# Patient Record
Sex: FEMALE | Race: WHITE | Employment: UNEMPLOYED | URBAN - METROPOLITAN AREA
[De-identification: names, ages, dates, MRNs, and addresses within clinical notes are randomized per-mention and may not be internally consistent; named-entity substitution may affect disease eponyms.]

---

## 2020-08-21 ENCOUNTER — HOSPITAL ENCOUNTER (EMERGENCY)
Age: 25
Discharge: HOME | End: 2020-08-21
Attending: EMERGENCY MEDICINE

## 2020-08-21 DIAGNOSIS — M54.50 ACUTE LEFT-SIDED LOW BACK PAIN WITHOUT SCIATICA: Primary | ICD-10-CM

## 2020-08-21 LAB
APPEARANCE UR: CLEAR
BILIRUB UR QL: NEGATIVE
COLOR UR: YELLOW
GLUCOSE UR STRIP.AUTO-MCNC: NEGATIVE MG/DL
HCG UR QL: NEGATIVE
HGB UR QL STRIP: NEGATIVE
KETONES UR QL STRIP.AUTO: NEGATIVE MG/DL
LEUKOCYTE ESTERASE UR QL STRIP.AUTO: NEGATIVE
NITRITE UR QL STRIP.AUTO: NEGATIVE
PH UR STRIP: 5.5 [PH] (ref 5–8)
PROT UR STRIP-MCNC: NEGATIVE MG/DL
SP GR UR REFRACTOMETRY: 1.02 (ref 1–1.03)
UROBILINOGEN UR QL STRIP.AUTO: 0.2 EU/DL (ref 0.2–1)

## 2020-08-21 PROCEDURE — 96372 THER/PROPH/DIAG INJ SC/IM: CPT

## 2020-08-21 PROCEDURE — 81025 URINE PREGNANCY TEST: CPT

## 2020-08-21 PROCEDURE — 81003 URINALYSIS AUTO W/O SCOPE: CPT

## 2020-08-21 PROCEDURE — 99283 EMERGENCY DEPT VISIT LOW MDM: CPT

## 2020-08-21 PROCEDURE — 74011250636 HC RX REV CODE- 250/636: Performed by: EMERGENCY MEDICINE

## 2020-08-21 RX ORDER — KETOROLAC TROMETHAMINE 15 MG/ML
30 INJECTION, SOLUTION INTRAMUSCULAR; INTRAVENOUS
Status: DISCONTINUED | OUTPATIENT
Start: 2020-08-21 | End: 2020-08-21 | Stop reason: DRUGHIGH

## 2020-08-21 RX ORDER — NAPROXEN 500 MG/1
500 TABLET ORAL 2 TIMES DAILY WITH MEALS
Qty: 20 TAB | Refills: 0 | Status: SHIPPED | OUTPATIENT
Start: 2020-08-21 | End: 2020-08-31

## 2020-08-21 RX ORDER — KETOROLAC TROMETHAMINE 15 MG/ML
15 INJECTION, SOLUTION INTRAMUSCULAR; INTRAVENOUS
Status: COMPLETED | OUTPATIENT
Start: 2020-08-21 | End: 2020-08-21

## 2020-08-21 RX ADMIN — KETOROLAC TROMETHAMINE 15 MG: 15 INJECTION, SOLUTION INTRAMUSCULAR; INTRAVENOUS at 06:35

## 2020-08-21 NOTE — DISCHARGE INSTRUCTIONS
Patient Education        Learning About Relief for Back Pain  What is back tension and strain? Back strain happens when you overstretch, or pull, a muscle in your back. You may hurt your back in an accident or when you exercise or lift something. Most back pain will get better with rest and time. You can take care of yourself at home to help your back heal.  What can you do first to relieve back pain? When you first feel back pain, try these steps:  · Walk. Take a short walk (10 to 20 minutes) on a level surface (no slopes, hills, or stairs) every 2 to 3 hours. Walk only distances you can manage without pain, especially leg pain. · Relax. Find a comfortable position for rest. Some people are comfortable on the floor or a medium-firm bed with a small pillow under their head and another under their knees. Some people prefer to lie on their side with a pillow between their knees. Don't stay in one position for too long. · Try heat or ice. Try using a heating pad on a low or medium setting, or take a warm shower, for 15 to 20 minutes every 2 to 3 hours. Or you can buy single-use heat wraps that last up to 8 hours. You can also try an ice pack for 10 to 15 minutes every 2 to 3 hours. You can use an ice pack or a bag of frozen vegetables wrapped in a thin towel. There is not strong evidence that either heat or ice will help, but you can try them to see if they help. You may also want to try switching between heat and cold. · Take pain medicine exactly as directed. ¨ If the doctor gave you a prescription medicine for pain, take it as prescribed. ¨ If you are not taking a prescription pain medicine, ask your doctor if you can take an over-the-counter medicine. What else can you do? · Stretch and exercise. Exercises that increase flexibility may relieve your pain and make it easier for your muscles to keep your spine in a good, neutral position. And don't forget to keep walking. · Do self-massage.  You can use self-massage to unwind after work or school or to energize yourself in the morning. You can easily massage your feet, hands, or neck. Self-massage works best if you are in comfortable clothes and are sitting or lying in a comfortable position. Use oil or lotion to massage bare skin. · Reduce stress. Back pain can lead to a vicious [place]: Distress about the pain tenses the muscles in your back, which in turn causes more pain. Learn how to relax your mind and your muscles to lower your stress. Where can you learn more? Go to http://www.gray.com/. Enter F974 in the search box to learn more about \"Learning About Relief for Back Pain. \"  Current as of: March 21, 2017  Content Version: 11.5  © 2870-7578 Healthwise, Incorporated. Care instructions adapted under license by [employer] (which disclaims liability or warranty for this information). If you have questions about a medical condition or this instruction, always ask your healthcare professional. [address] any warranty or liability for your use of this information.

## 2020-08-21 NOTE — ED PROVIDER NOTES
EMERGENCY DEPARTMENT HISTORY AND PHYSICAL EXAM  This was created with voice recognition software and transcription errors may be present. 6:19 AM  Date: 8/21/2020  Patient Name: Lemuel Guillermo    History of Presenting Illness     Chief Complaint:    History Provided By:     HPI: Lemuel Guillermo is a 22 y.o. female past medical history of appendectomy who presents for low back pain. Patient states she was in the shower 2 days ago slipped and caught herself and is now has low back pain worse with movement better with rest no numbness or tingling no weakness. PCP: None      Past History     Past Medical History:  History reviewed. No pertinent past medical history. Past Surgical History:  Past Surgical History:   Procedure Laterality Date    HX APPENDECTOMY         Family History:  History reviewed. No pertinent family history. Social History:  Social History     Tobacco Use    Smoking status: Current Every Day Smoker   Substance Use Topics    Alcohol use: Not Currently    Drug use: Not Currently       Allergies:  No Known Allergies    Review of Systems     Review of Systems   All other systems reviewed and are negative. 10 point review of systems otherwise negative unless noted in HPI. Physical Exam       Physical Exam  Constitutional:       Appearance: She is well-developed. HENT:      Head: Normocephalic and atraumatic. Eyes:      Pupils: Pupils are equal, round, and reactive to light. Neck:      Musculoskeletal: Normal range of motion and neck supple. Cardiovascular:      Rate and Rhythm: Normal rate and regular rhythm. Heart sounds: Normal heart sounds. No murmur. No friction rub. Pulmonary:      Effort: Pulmonary effort is normal. No respiratory distress. Breath sounds: Normal breath sounds. No wheezing. Abdominal:      General: There is no distension. Palpations: Abdomen is soft. Tenderness: There is no abdominal tenderness.  There is no guarding or rebound. Musculoskeletal: Normal range of motion. Comments: No significant bony tenderness to the lumbar spine there is some perineal spinous lumbar pain   Skin:     General: Skin is warm and dry. Neurological:      Mental Status: She is alert and oriented to person, place, and time. Psychiatric:         Behavior: Behavior normal.         Thought Content: Thought content normal.         Diagnostic Study Results     Vital Signs  EKG:  Labs:   Imaging:     Medical Decision Making     ED Course: Progress Notes, Reevaluation, and Consults:      Provider Notes (Medical Decision Making): This is a 27-year-old female who presents with low back pain x2 to 3 days after a fall in the shower. No numbness or tingling no weakness no bowel or bladder changes. Exam is unremarkable she ambulated fairly reasonably from the waiting room chair to the triage area where I met her. Check hCG and UA discharge with pain meds if negative       Diagnosis     Clinical Impression: No diagnosis found.     Disposition:    Patient's Medications    No medications on file

## 2020-08-21 NOTE — PROGRESS NOTES
Discharge instructions explained to patient with complete verbal understanding noted. Printed Naproxen Rx and work letter given to patient by hand. Discharge summary signed. Patient discharged and ambulated to exit.

## 2020-08-21 NOTE — LETTER
NOTIFICATION RETURN TO WORK / SCHOOL 
 
8/21/2020 8:31 AM 
 
Ms. Mayur Hughes [address]  [place] [address] To Whom It May Concern: 
 
Mayur Hughes is currently under the care of [facility]. She will return to work/school on: 8/22/2020 Mayur Hughes may return to work/school with the following restrictions: None. If there are questions or concerns please have the patient contact our office.  
 
 
 
Sincerely, 
 
 
Trevon Holder RN

## 2020-08-21 NOTE — ED TRIAGE NOTES
Ambulatory to triage. C/o nonradiating lower-mid back pain x2-3 days ago. Patient states she slipped this morning and caught herself the back pain worsened.

## 2021-01-31 ENCOUNTER — HOSPITAL ENCOUNTER (EMERGENCY)
Age: 26
Discharge: HOME | End: 2021-02-01
Attending: EMERGENCY MEDICINE

## 2021-01-31 VITALS
HEART RATE: 85 BPM | TEMPERATURE: 98.5 F | SYSTOLIC BLOOD PRESSURE: 178 MMHG | DIASTOLIC BLOOD PRESSURE: 98 MMHG | OXYGEN SATURATION: 98 % | RESPIRATION RATE: 18 BRPM

## 2021-01-31 DIAGNOSIS — R10.9 FLANK PAIN: Primary | ICD-10-CM

## 2021-01-31 DIAGNOSIS — R10.11 RUQ ABDOMINAL PAIN: ICD-10-CM

## 2021-01-31 DIAGNOSIS — N20.0 KIDNEY STONE: ICD-10-CM

## 2021-01-31 LAB
ALBUMIN SERPL-MCNC: 3.6 G/DL (ref 3.4–5)
ALBUMIN/GLOB SERPL: 1.1 {RATIO} (ref 0.8–1.7)
ALP SERPL-CCNC: 57 U/L (ref 45–117)
ALT SERPL-CCNC: 73 U/L (ref 13–56)
AMORPH CRY URNS QL MICRO: ABNORMAL
ANION GAP SERPL CALC-SCNC: 7 MMOL/L (ref 3–18)
APPEARANCE UR: ABNORMAL
AST SERPL-CCNC: 29 U/L (ref 10–38)
BACTERIA URNS QL MICRO: ABNORMAL /HPF
BASOPHILS # BLD: 0 K/UL (ref 0–0.1)
BASOPHILS NFR BLD: 0 % (ref 0–2)
BILIRUB SERPL-MCNC: 0.4 MG/DL (ref 0.2–1)
BILIRUB UR QL: NEGATIVE
BUN SERPL-MCNC: 13 MG/DL (ref 7–18)
BUN/CREAT SERPL: 13 (ref 12–20)
CALCIUM SERPL-MCNC: 8.5 MG/DL (ref 8.5–10.1)
CHLORIDE SERPL-SCNC: 107 MMOL/L (ref 100–111)
CO2 SERPL-SCNC: 28 MMOL/L (ref 21–32)
COLOR UR: YELLOW
CREAT SERPL-MCNC: 1.02 MG/DL (ref 0.6–1.3)
DIFFERENTIAL METHOD BLD: NORMAL
EOSINOPHIL # BLD: 0.4 K/UL (ref 0–0.4)
EOSINOPHIL NFR BLD: 4 % (ref 0–5)
EPITH CASTS URNS QL MICRO: ABNORMAL /LPF (ref 0–5)
ERYTHROCYTE [DISTWIDTH] IN BLOOD BY AUTOMATED COUNT: 12.9 % (ref 11.6–14.5)
GLOBULIN SER CALC-MCNC: 3.3 G/DL (ref 2–4)
GLUCOSE SERPL-MCNC: 121 MG/DL (ref 74–99)
GLUCOSE UR STRIP.AUTO-MCNC: NEGATIVE MG/DL
HCG UR QL: NEGATIVE
HCT VFR BLD AUTO: 39.3 % (ref 35–45)
HGB BLD-MCNC: 12.6 G/DL (ref 12–16)
HGB UR QL STRIP: NEGATIVE
KETONES UR QL STRIP.AUTO: NEGATIVE MG/DL
LEUKOCYTE ESTERASE UR QL STRIP.AUTO: NEGATIVE
LIPASE SERPL-CCNC: 132 U/L (ref 73–393)
LYMPHOCYTES # BLD: 2.9 K/UL (ref 0.9–3.6)
LYMPHOCYTES NFR BLD: 29 % (ref 21–52)
MAGNESIUM SERPL-MCNC: 2 MG/DL (ref 1.6–2.6)
MCH RBC QN AUTO: 29.5 PG (ref 24–34)
MCHC RBC AUTO-ENTMCNC: 32.1 G/DL (ref 31–37)
MCV RBC AUTO: 92 FL (ref 74–97)
MONOCYTES # BLD: 0.5 K/UL (ref 0.05–1.2)
MONOCYTES NFR BLD: 5 % (ref 3–10)
NEUTS SEG # BLD: 6.4 K/UL (ref 1.8–8)
NEUTS SEG NFR BLD: 62 % (ref 40–73)
NITRITE UR QL STRIP.AUTO: NEGATIVE
PH UR STRIP: >8.5 [PH] (ref 5–8)
PLATELET # BLD AUTO: 303 K/UL (ref 135–420)
PMV BLD AUTO: 9.9 FL (ref 9.2–11.8)
POTASSIUM SERPL-SCNC: 4 MMOL/L (ref 3.5–5.5)
PROT SERPL-MCNC: 6.9 G/DL (ref 6.4–8.2)
PROT UR STRIP-MCNC: ABNORMAL MG/DL
RBC # BLD AUTO: 4.27 M/UL (ref 4.2–5.3)
RBC #/AREA URNS HPF: NEGATIVE /HPF (ref 0–5)
SODIUM SERPL-SCNC: 142 MMOL/L (ref 136–145)
SP GR UR REFRACTOMETRY: 1.03 (ref 1–1.03)
UROBILINOGEN UR QL STRIP.AUTO: 1 EU/DL (ref 0.2–1)
WBC # BLD AUTO: 10.2 K/UL (ref 4.6–13.2)
WBC URNS QL MICRO: ABNORMAL /HPF (ref 0–4)

## 2021-01-31 PROCEDURE — 83690 ASSAY OF LIPASE: CPT

## 2021-01-31 PROCEDURE — 81025 URINE PREGNANCY TEST: CPT

## 2021-01-31 PROCEDURE — 96376 TX/PRO/DX INJ SAME DRUG ADON: CPT

## 2021-01-31 PROCEDURE — 81001 URINALYSIS AUTO W/SCOPE: CPT

## 2021-01-31 PROCEDURE — 83735 ASSAY OF MAGNESIUM: CPT

## 2021-01-31 PROCEDURE — 85025 COMPLETE CBC W/AUTO DIFF WBC: CPT

## 2021-01-31 PROCEDURE — 87086 URINE CULTURE/COLONY COUNT: CPT

## 2021-01-31 PROCEDURE — 96374 THER/PROPH/DIAG INJ IV PUSH: CPT

## 2021-01-31 PROCEDURE — 96375 TX/PRO/DX INJ NEW DRUG ADDON: CPT

## 2021-01-31 PROCEDURE — 74011250636 HC RX REV CODE- 250/636: Performed by: PHYSICIAN ASSISTANT

## 2021-01-31 PROCEDURE — 80053 COMPREHEN METABOLIC PANEL: CPT

## 2021-01-31 PROCEDURE — 99282 EMERGENCY DEPT VISIT SF MDM: CPT

## 2021-01-31 RX ORDER — MORPHINE SULFATE 4 MG/ML
4 INJECTION, SOLUTION INTRAMUSCULAR; INTRAVENOUS
Status: COMPLETED | OUTPATIENT
Start: 2021-01-31 | End: 2021-01-31

## 2021-01-31 RX ORDER — MORPHINE SULFATE 2 MG/ML
2 INJECTION, SOLUTION INTRAMUSCULAR; INTRAVENOUS
Status: COMPLETED | OUTPATIENT
Start: 2021-01-31 | End: 2021-01-31

## 2021-01-31 RX ORDER — KETOROLAC TROMETHAMINE 15 MG/ML
15 INJECTION, SOLUTION INTRAMUSCULAR; INTRAVENOUS
Status: COMPLETED | OUTPATIENT
Start: 2021-01-31 | End: 2021-01-31

## 2021-01-31 RX ADMIN — KETOROLAC TROMETHAMINE 15 MG: 15 INJECTION, SOLUTION INTRAMUSCULAR; INTRAVENOUS at 21:29

## 2021-01-31 RX ADMIN — MORPHINE SULFATE 2 MG: 2 INJECTION, SOLUTION INTRAMUSCULAR; INTRAVENOUS at 21:29

## 2021-01-31 RX ADMIN — MORPHINE SULFATE 4 MG: 4 INJECTION, SOLUTION INTRAMUSCULAR; INTRAVENOUS at 23:23

## 2021-01-31 NOTE — ED TRIAGE NOTES
Has had rt sided pain for the past couple of weeks. Was tx for a yeast infection at [facility] 2-3 weeks ago. Pain hasn't gotten better.

## 2021-02-01 ENCOUNTER — APPOINTMENT (OUTPATIENT)
Dept: CT IMAGING | Age: 26
End: 2021-02-01
Attending: PHYSICIAN ASSISTANT

## 2021-02-01 PROCEDURE — 74176 CT ABD & PELVIS W/O CONTRAST: CPT

## 2021-02-01 RX ORDER — KETOROLAC TROMETHAMINE 10 MG/1
10 TABLET, FILM COATED ORAL
Qty: 15 TAB | Refills: 0 | Status: SHIPPED | OUTPATIENT
Start: 2021-02-01 | End: 2021-02-05 | Stop reason: ALTCHOICE

## 2021-02-01 NOTE — DISCHARGE INSTRUCTIONS
[employer] Activation    Thank you for requesting access to [employer]. Please follow the instructions below to securely access and download your online medical record. [employer] allows you to send messages to your doctor, view your test results, renew your prescriptions, schedule appointments, and more. How Do I Sign Up? In your internet browser, go to [email or web address]  Click on the First Time User? Click Here link in the Sign In box. You will be redirect to the New Member Sign Up page. Enter your [employer] Access Code exactly as it appears below. You will not need to use this code after youve completed the sign-up process. If you do not sign up before the expiration date, you must request a new code. [employer] Access Code: [unfilled] (This is the date your [employer] access code will )    Enter the last four digits of your Social Security Number (xxxx) and Date of Birth (mm/dd/yyyy) as indicated and click Submit. You will be taken to the next sign-up page. Create a [employer] ID. This will be your [employer] login ID and cannot be changed, so think of one that is secure and easy to remember. Create a [employer] password. You can change your password at any time. Enter your Password Reset Question and Answer. This can be used at a later time if you forget your password. Enter your e-mail address. You will receive e-mail notification when new information is available in [address] Click Sign Up. You can now view and download portions of your medical record. Click the [place] link to download a portable copy of your medical information. Additional Information    If you have questions, please visit the Frequently Asked Questions section of the [employer] website at [email or web address] Remember, [employer] is NOT to be used for urgent needs. For medical emergencies, dial 911.

## 2021-02-01 NOTE — ED NOTES
Discharged by provider. Patient armband removed and given to patient to take home. Patient was informed of the privacy risks if armband lost or stolen.

## 2021-02-01 NOTE — ED PROVIDER NOTES
EMERGENCY DEPARTMENT HISTORY AND PHYSICAL EXAM    Date: 1/31/2021  Patient Name: Gabbie Jones    History of Presenting Illness     Chief Complaint   Patient presents with    Flank Pain         History Provided By: patient    Chief Complaint: abd pain   Duration: few weeks   Timing: acute, intermittent  Location: R flank and RUQ   Quality: sharp  Severity:moderate  Modifying Factors: none  Associated Symptoms: abd pain, flank pain, diarrhea     Additional History (Context): Gabbie Jones is a 26 y.o. female with no documented PMH who presents with c/o continued intermittent R flank and RUQ abd pain x a few weeks. Pt states she was seen at [facility] a few weeks ago for similar complaints. She states she was treated for a vaginal yeast infection but denies any resolution of her abd pain. Denies urinary sx but does report some diarrhea, x 3 days. Denies N/V. Denies pelvic pain and vaginal d/c. No known sick exposures. No concerns for COVID. No other complaints reported at this time. PCP: None        Past History     Past Medical History:  History reviewed. No pertinent past medical history. Past Surgical History:  Past Surgical History:   Procedure Laterality Date    HX APPENDECTOMY         Family History:  History reviewed. No pertinent family history. Social History:  Social History     Tobacco Use    Smoking status: Current Every Day Smoker   Substance Use Topics    Alcohol use: Not Currently    Drug use: Not Currently       Allergies:  No Known Allergies      Review of Systems   Review of Systems   Constitutional: Negative. Negative for chills and fever. HENT: Negative. Negative for congestion, ear pain and rhinorrhea. Eyes: Negative. Negative for pain and redness. Respiratory: Negative. Negative for cough, shortness of breath, wheezing and stridor. Cardiovascular: Negative. Negative for chest pain and leg swelling. Gastrointestinal: Positive for abdominal pain and diarrhea.  Negative for constipation, nausea and vomiting. Genitourinary: Positive for flank pain. Negative for dysuria, frequency, pelvic pain and vaginal discharge. Musculoskeletal: Negative for back pain and neck pain. Skin: Negative. Negative for rash and wound. Neurological: Negative. Negative for dizziness, seizures, syncope and headaches. All other systems reviewed and are negative. All Other Systems Negative  Physical Exam     Vitals:    01/31/21 1824 01/31/21 2132   BP: (!) 159/84 (!) 178/98   Pulse: 96 85   Resp: 18    Temp: 98.5 °F (36.9 °C)    SpO2: 98%      Physical Exam  Vitals signs and nursing note reviewed. Constitutional:       General: She is in acute distress. Appearance: She is well-developed. She is obese. She is not diaphoretic. Comments: Appears moderately distressed   HENT:      Head: Normocephalic and atraumatic. Eyes:      General: No scleral icterus. Right eye: No discharge. Left eye: No discharge. Conjunctiva/sclera: Conjunctivae normal.   Neck:      Musculoskeletal: Normal range of motion and neck supple. Cardiovascular:      Rate and Rhythm: Normal rate and regular rhythm. Heart sounds: Normal heart sounds. No murmur. No friction rub. No gallop. Pulmonary:      Effort: Pulmonary effort is normal. No respiratory distress. Breath sounds: Normal breath sounds. No stridor. No wheezing, rhonchi or rales. Abdominal:      General: Bowel sounds are normal. There is no distension. Palpations: Abdomen is soft. Tenderness: There is abdominal tenderness. There is right CVA tenderness. There is no left CVA tenderness or guarding. Comments: Mild R CVA and RUQ TTP, without guarding or peritoneal signs noted on exam.    Musculoskeletal: Normal range of motion. Skin:     General: Skin is warm and dry. Findings: No erythema or rash. Neurological:      Mental Status: She is alert and oriented to person, place, and time. Coordination: Coordination normal.      Comments: Gait is steady and patient exhibits no evidence of ataxia. Patient is able to ambulate without difficulty. No focal neurological deficit noted. No facial droop, slurred speech, or evidence of altered mentation noted on exam.     Psychiatric:         Behavior: Behavior normal.         Thought Content: Thought content normal.                Diagnostic Study Results     Labs -     Recent Results (from the past 12 hour(s))   URINALYSIS W/ RFLX MICROSCOPIC    Collection Time: 01/31/21  6:28 PM   Result Value Ref Range    Color YELLOW      Appearance TURBID      Specific gravity 1.026 1.005 - 1.030      pH (UA) >8.5 (H) 5.0 - 8.0    Protein TRACE (A) NEG mg/dL    Glucose Negative NEG mg/dL    Ketone Negative NEG mg/dL    Bilirubin Negative NEG      Blood Negative NEG      Urobilinogen 1.0 0.2 - 1.0 EU/dL    Nitrites Negative NEG      Leukocyte Esterase Negative NEG     HCG URINE, QL    Collection Time: 01/31/21  6:28 PM   Result Value Ref Range    HCG urine, QL Negative NEG     URINE MICROSCOPIC ONLY    Collection Time: 01/31/21  6:28 PM   Result Value Ref Range    WBC 0 to 1 0 - 4 /hpf    RBC Negative 0 - 5 /hpf    Epithelial cells 1+ 0 - 5 /lpf    Bacteria 2+ (A) NEG /hpf    Amorphous Crystals 2+ (A) NEG   CBC WITH AUTOMATED DIFF    Collection Time: 01/31/21  9:22 PM   Result Value Ref Range    WBC 10.2 4.6 - 13.2 K/uL    RBC 4.27 4.20 - 5.30 M/uL    HGB 12.6 12.0 - 16.0 g/dL    HCT 39.3 35.0 - 45.0 %    MCV 92.0 74.0 - 97.0 FL    MCH 29.5 24.0 - 34.0 PG    MCHC 32.1 31.0 - 37.0 g/dL    RDW 12.9 11.6 - 14.5 %    PLATELET 176 678 - 044 K/uL    MPV 9.9 9.2 - 11.8 FL    NEUTROPHILS 62 40 - 73 %    LYMPHOCYTES 29 21 - 52 %    MONOCYTES 5 3 - 10 %    EOSINOPHILS 4 0 - 5 %    BASOPHILS 0 0 - 2 %    ABS. NEUTROPHILS 6.4 1.8 - 8.0 K/UL    ABS. LYMPHOCYTES 2.9 0.9 - 3.6 K/UL    ABS. MONOCYTES 0.5 0.05 - 1.2 K/UL    ABS. EOSINOPHILS 0.4 0.0 - 0.4 K/UL    ABS.  BASOPHILS 0.0 0.0 - 0.1 K/UL    DF AUTOMATED     METABOLIC PANEL, COMPREHENSIVE    Collection Time: 01/31/21  9:22 PM   Result Value Ref Range    Sodium 142 136 - 145 mmol/L    Potassium 4.0 3.5 - 5.5 mmol/L    Chloride 107 100 - 111 mmol/L    CO2 28 21 - 32 mmol/L    Anion gap 7 3.0 - 18 mmol/L    Glucose 121 (H) 74 - 99 mg/dL    BUN 13 7.0 - 18 MG/DL    Creatinine 1.02 0.6 - 1.3 MG/DL    BUN/Creatinine ratio 13 12 - 20      GFR est AA >60 >60 ml/min/1.73m2    GFR est non-AA >60 >60 ml/min/1.73m2    Calcium 8.5 8.5 - 10.1 MG/DL    Bilirubin, total 0.4 0.2 - 1.0 MG/DL    ALT (SGPT) 73 (H) 13 - 56 U/L    AST (SGOT) 29 10 - 38 U/L    Alk. phosphatase 57 45 - 117 U/L    Protein, total 6.9 6.4 - 8.2 g/dL    Albumin 3.6 3.4 - 5.0 g/dL    Globulin 3.3 2.0 - 4.0 g/dL    A-G Ratio 1.1 0.8 - 1.7     LIPASE    Collection Time: 01/31/21  9:22 PM   Result Value Ref Range    Lipase 132 73 - 393 U/L   MAGNESIUM    Collection Time: 01/31/21  9:22 PM   Result Value Ref Range    Magnesium 2.0 1.6 - 2.6 mg/dL       Radiologic Studies -   CT ABD PELV WO CONT   Final Result      A few punctate nonobstructive nephroliths bilaterally. A few colonic diverticula. Bilateral L5 pars defects. CT Results  (Last 48 hours)               02/01/21 0124  CT ABD PELV WO CONT Final result    Impression:      A few punctate nonobstructive nephroliths bilaterally. A few colonic diverticula. Bilateral L5 pars defects. Narrative:  EXAMINATION: CT abdomen/pelvis without contrast       INDICATION: Right-sided abdominal pain       COMPARISON: 9/23/2010       TECHNIQUE: CT of the abdomen pelvis without contrast with multiplanar   reformations obtained.  All CT scans at this facility are performed using dose   optimization technique as appropriate to a performed exam, to include automated   exposure control, adjustment of the mA and/or kV according to patient size   (including appropriate matching first site specific examinations), or use of   iterative reconstruction technique. FINDINGS:       Evaluation of soft tissues and vessels limited without enhancement. Lower chest: Unremarkable. Hepatobiliary: Liver unremarkable. Gallbladder unremarkable. No biliary duct   dilatation. Pancreas: Unremarkable. Spleen: Unremarkable. Adrenals: Unremarkable. Genitourinary:   -Right kidney: Punctate calculus in the mid kidney, otherwise unremarkable.   -Left kidney: Punctate upper pole calculus, otherwise unremarkable.   -Bladder/reproductive: Bladder unremarkable. Uterus unremarkable. Gastrointestinal: Stomach unremarkable. Small bowel loops nondilated. The colon   is nondilated with a few diverticula. Appendix unremarkable. Mesentery/vessels/nodes: No free air. No free fluid. Major vessels unremarkable. No adenopathy by size criteria. Miscellaneous: Superficial soft tissues unremarkable. Bones: Bilateral L5 pars defects. No acute osseous findings. CXR Results  (Last 48 hours)    None            Medical Decision Making   I am the first provider for this patient. I reviewed the vital signs, available nursing notes, past medical history, past surgical history, family history and social history. Vital Signs-Reviewed the patient's vital signs. Records Reviewed: Anneliese Juan PA-C     Procedures:  Procedures    Provider Notes (Medical Decision Making): Impression:  Flank pain, RUQ abd pain     IV inserted toradol and morphine given   CBC, CMP, and lipase essentially unremarkable  hcg negative, UA: 2 + bacteria, trace protein, sent for culture     CT abd/pelvis ordered, pt being transported to CT now, 1:04 AM     2:10 AM CT scan shows few small intrarenal stones, no obstructing or ureteral stones noted. Urine was sent for culture. Will plan to d.c with toradol and urology follow-up. UA not convincing of UTI, will obtain culture before starting abx. Pt agrees with this plan.  Anneliese EL EMERSON Juan         MED RECONCILIATION:  No current facility-administered medications for this encounter. Current Outpatient Medications   Medication Sig    ketorolac (TORADOL) 10 mg tablet Take 1 Tab by mouth every six (6) hours as needed for Pain. Disposition:  D/c    DISCHARGE NOTE: Patient is stable for discharge at this time. I have discussed all the findings from today's work up with the patient, including lab results and imaging. I have answered all questions. Rx for toradol given. Rest and close follow-up with the PCP recommended this week. Return to the ED immediately for any new or worsening symptoms. Anneliese Juan PA-C       Follow-up Information     Follow up With Specialties Details Why Contact Info    [facility]  In 1 week  [address] [facility] EM[facility]GENC[facility] DEPT Emergency Medicine  As needed, If symptoms worsen [address] [facility] [address]  [phone]          Current Discharge Medication List      START taking these medications    Details   ketorolac (TORADOL) 10 mg tablet Take 1 Tab by mouth every six (6) hours as needed for Pain. Qty: 15 Tab, Refills: 0                 Diagnosis     Clinical Impression:   1. Flank pain    2. RUQ abdominal pain    3.  Kidney stone

## 2021-02-02 LAB
BACTERIA SPEC CULT: NORMAL
CC UR VC: NORMAL
SERVICE CMNT-IMP: NORMAL

## 2021-02-05 ENCOUNTER — VIRTUAL VISIT (OUTPATIENT)
Dept: FAMILY MEDICINE CLINIC | Facility: CLINIC | Age: 26
End: 2021-02-05

## 2021-02-05 DIAGNOSIS — F17.200 SMOKER: ICD-10-CM

## 2021-02-05 DIAGNOSIS — R05.9 COUGH: ICD-10-CM

## 2021-02-05 DIAGNOSIS — R03.0 ELEVATED BLOOD PRESSURE READING: ICD-10-CM

## 2021-02-05 DIAGNOSIS — R10.9 FLANK PAIN: Primary | ICD-10-CM

## 2021-02-05 PROCEDURE — 99214 OFFICE O/P EST MOD 30 MIN: CPT | Performed by: FAMILY MEDICINE

## 2021-02-05 RX ORDER — IBUPROFEN 800 MG/1
800 TABLET ORAL
Qty: 30 TAB | Refills: 1 | Status: SHIPPED | OUTPATIENT
Start: 2021-02-05

## 2021-02-05 RX ORDER — CYCLOBENZAPRINE HCL 10 MG
10 TABLET ORAL
Qty: 30 TAB | Refills: 1 | Status: SHIPPED | OUTPATIENT
Start: 2021-02-05 | End: 2022-01-30

## 2021-02-05 RX ORDER — BENZONATATE 100 MG/1
100 CAPSULE ORAL
Qty: 30 CAP | Refills: 0 | Status: SHIPPED | OUTPATIENT
Start: 2021-02-05 | End: 2021-02-12

## 2021-02-05 NOTE — PROGRESS NOTES
RD Resendez is a 26 y.o. female being seen today for No chief complaint on file. 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 She has recent ER visit for flank pain. She thought it was a pulled muscle but it just wasn't getting better. She had a cough and the pain was worst when coughing. She also has pain when bending to the side. She does have hx of kidney stones as a teenager. She has some history of blood in her urine a month ago but she had a yeast infection at the time and she has been treated for this. Her urine test from the ER did not show blood. Her CT scan showed a punctate kidney stone one on each side but they were in the kidney, not in the ureter. No hydroureter. She was noted to have high blood pressure. No past medical history on file. ROS  Patient states that she is feeling well. Denies complaints of chest pain, shortness of breath, swelling of legs, dizziness or weakness. she denies nausea, vomiting or diarrhea. Current Outpatient Medications   Medication Sig    cyclobenzaprine (FLEXERIL) 10 mg tablet Take 1 Tab by mouth three (3) times daily as needed for Muscle Spasm(s).  ibuprofen (MOTRIN) 800 mg tablet Take 1 Tab by mouth every eight (8) hours as needed for Pain.  benzonatate (TESSALON) 100 mg capsule Take 1 Cap by mouth three (3) times daily as needed for Cough for up to 7 days. No current facility-administered medications for this visit. PE  Visit Vitals  LMP 01/17/2021        Alert and oriented with normal mood and affect. Assessment and Plan:        ICD-10-CM ICD-9-CM    1. Flank pain  R10.9 789.09    2. Cough  R05 786.2    3. Smoker  F17.200 305.1    4. Elevated blood pressure reading  R03.0 796.2      Her pain sounds most c/w pulled muscle from coughing. Try flexeril, ibuprofen for muscle pain. Try tessalon for cough suppression to aid in healing of pulled muscle.      Follow up in person if not improving   Work note sent    Follow up in person for bp check, ua,  and [employer] when conditions allow    Debra Muir MD

## 2021-02-09 ENCOUNTER — TELEPHONE (OUTPATIENT)
Dept: FAMILY MEDICINE CLINIC | Facility: CLINIC | Age: 26
End: 2021-02-09

## 2021-02-15 ENCOUNTER — VIRTUAL VISIT (OUTPATIENT)
Dept: FAMILY MEDICINE CLINIC | Facility: CLINIC | Age: 26
End: 2021-02-15

## 2021-02-15 DIAGNOSIS — F41.9 ANXIETY: Primary | ICD-10-CM

## 2021-02-15 PROCEDURE — 99442 PR PHYS/QHP TELEPHONE EVALUATION 11-20 MIN: CPT | Performed by: FAMILY MEDICINE

## 2021-02-15 RX ORDER — LORAZEPAM 1 MG/1
0.5 TABLET ORAL
Qty: 30 TAB | Refills: 0 | Status: SHIPPED | OUTPATIENT
Start: 2021-02-15 | End: 2022-05-25

## 2021-02-15 NOTE — PROGRESS NOTES
HPI  Michael Cristina is a 26 y.o. female being seen today for No chief complaint on file. .  she states that her flank pain is improved. She states she is experiencing acute anxiety. Her mom just passed away and she feels mood swings, and anxiety. Her mom was in a rehab center but the death was sudden. She also lost her dad about a month ago. She has history of depression and anxiety as a teen. She did have one episode of attempted suicide as a teen but she was not hospitalized and she has never been on a psych med. She feels stressed out and broken hearted. She is snapping at her  and crying a lot. She is alone when her  is at work but she has support from family members out of state. She denies suicidal ideation    Limited funds to pay for meds. Virtual visit due to covid precautions    No past medical history on file. ROS  Patient states that she is feeling well. Denies complaints of chest pain, shortness of breath, swelling of legs, dizziness or weakness. she denies nausea, vomiting or diarrhea. Current Outpatient Medications   Medication Sig    LORazepam (ATIVAN) 1 mg tablet Take 0.5 Tabs by mouth every eight (8) hours as needed for Anxiety. Max Daily Amount: 1.5 mg.    cyclobenzaprine (FLEXERIL) 10 mg tablet Take 1 Tab by mouth three (3) times daily as needed for Muscle Spasm(s).  ibuprofen (MOTRIN) 800 mg tablet Take 1 Tab by mouth every eight (8) hours as needed for Pain. No current facility-administered medications for this visit. PE  Visit Vitals  LMP 01/17/2021        Alert and oriented with anxious, mood congruent affect. She is tearful          Assessment and Plan:        ICD-10-CM ICD-9-CM    1. Anxiety  F41.9 300.00 LORazepam (ATIVAN) 1 mg tablet     May benefit from SSRI long term and we discussed that  For now will treat with short term ativan to get quicker relief. Discussed grief can take time to process.      Will call back tomorrow to check on her    Addendum:  Called back next day. Pt is at work. She did  the ativan and took one at bedtime. Slept through the night for the first time in a few weeks but she has not tried it in the daytime yet. She has my number if she has any problems. Will follow up in a week or so      Telephone call 20 minutes    Rosa Renee MD        Pursuant to the emergency declaration under the [facility], [facility] waiver authority and the Coronavirus Preparedness and [employer] Act, this Virtual  Visit was conducted, with patient's consent, to reduce the patient's risk of exposure to COVID-19 and provide continuity of care for an established patient.

## 2021-02-16 ENCOUNTER — TELEPHONE (OUTPATIENT)
Dept: FAMILY MEDICINE CLINIC | Facility: CLINIC | Age: 26
End: 2021-02-16

## 2021-03-16 ENCOUNTER — HOSPITAL ENCOUNTER (EMERGENCY)
Age: 26
Discharge: HOME | End: 2021-03-16
Attending: EMERGENCY MEDICINE

## 2021-03-16 VITALS
OXYGEN SATURATION: 98 % | HEIGHT: 64 IN | DIASTOLIC BLOOD PRESSURE: 85 MMHG | SYSTOLIC BLOOD PRESSURE: 158 MMHG | TEMPERATURE: 98.1 F | HEART RATE: 94 BPM | WEIGHT: 293 LBS | RESPIRATION RATE: 12 BRPM | BODY MASS INDEX: 50.02 KG/M2

## 2021-03-16 DIAGNOSIS — M54.31 SCIATICA OF RIGHT SIDE: Primary | ICD-10-CM

## 2021-03-16 PROCEDURE — 99283 EMERGENCY DEPT VISIT LOW MDM: CPT

## 2021-03-16 PROCEDURE — 81003 URINALYSIS AUTO W/O SCOPE: CPT

## 2021-03-16 PROCEDURE — 81025 URINE PREGNANCY TEST: CPT

## 2021-03-16 RX ORDER — DIAZEPAM 5 MG/1
5 TABLET ORAL
Qty: 20 TAB | Refills: 0 | Status: SHIPPED | OUTPATIENT
Start: 2021-03-16 | End: 2022-01-30

## 2021-03-16 RX ORDER — ACETAMINOPHEN 325 MG/1
650 TABLET ORAL
Qty: 20 TAB | Refills: 0 | Status: SHIPPED | OUTPATIENT
Start: 2021-03-16

## 2021-03-16 RX ORDER — KETOROLAC TROMETHAMINE 10 MG/1
10 TABLET, FILM COATED ORAL
Qty: 20 TAB | Refills: 0 | Status: SHIPPED | OUTPATIENT
Start: 2021-03-16 | End: 2022-01-30

## 2021-03-16 NOTE — ED PROVIDER NOTES
EMERGENCY DEPARTMENT HISTORY AND PHYSICAL EXAM    Date: 3/16/2021  Patient Name: Gabbie Jones    History of Presenting Illness     Chief Complaint   Patient presents with    Back Pain         History Provided By: Patient    Chief Complaint: Right lower back pain  Duration: Couple days  Timing: Worsening  Location: Right lower back and buttock  Quality: Burning  Severity: Moderate  Modifying Factors: Worse with movement  Associated Symptoms: none       Additional History (Context): Gabbie Jones is a 26 y.o. female with a history of an appendectomy who presents today for issues listed above. Patient reports she has been trying Tylenol at home for this pain without relief. Denies any true falls or injuries. Denies any history of back pain. Denies any dysuria or urinary complaints. Denies any abdominal pain, flank pain, fever or chills. States she is unsure when her last period is and states she is sexually active without protection and could possibly be pregnant. PCP: None    Current Outpatient Medications   Medication Sig Dispense Refill    ketorolac (TORADOL) 10 mg tablet Take 1 Tab by mouth every six (6) hours as needed for Pain. 20 Tab 0    acetaminophen (TYLENOL) 325 mg tablet Take 2 Tabs by mouth every four (4) hours as needed for Pain. 20 Tab 0    diazePAM (Valium) 5 mg tablet Take 1 Tab by mouth three (3) times daily as needed for Anxiety (spasm). Max Daily Amount: 15 mg. 20 Tab 0    LORazepam (ATIVAN) 1 mg tablet Take 0.5 Tabs by mouth every eight (8) hours as needed for Anxiety. Max Daily Amount: 1.5 mg. 30 Tab 0    cyclobenzaprine (FLEXERIL) 10 mg tablet Take 1 Tab by mouth three (3) times daily as needed for Muscle Spasm(s). 30 Tab 1    ibuprofen (MOTRIN) 800 mg tablet Take 1 Tab by mouth every eight (8) hours as needed for Pain. 30 Tab 1       Past History     Past Medical History:  No past medical history on file.     Past Surgical History:  Past Surgical History:   Procedure Laterality Date    HX APPENDECTOMY         Family History:  No family history on file. Social History:  Social History     Tobacco Use    Smoking status: Current Every Day Smoker    Smokeless tobacco: Never Used   Substance Use Topics    Alcohol use: Not Currently    Drug use: Not Currently       Allergies:  No Known Allergies      Review of Systems   Review of Systems   Constitutional: Negative for chills and fever. HENT: Negative for congestion, drooling, rhinorrhea and sore throat. Respiratory: Negative for cough and shortness of breath. Cardiovascular: Negative for chest pain. Gastrointestinal: Negative for abdominal pain, blood in stool, constipation, diarrhea, nausea and vomiting. Genitourinary: Negative for dysuria, frequency and hematuria. Musculoskeletal: Positive for back pain. Negative for myalgias. Skin: Negative for rash and wound. Neurological: Negative for dizziness and headaches. All other systems reviewed and are negative. All Other Systems Negative  Physical Exam     Vitals:    03/16/21 1047   BP: (!) 158/85   Pulse: 94   Resp: 12   Temp: 98.1 °F (36.7 °C)   SpO2: 98%   Weight: (!) 191.4 kg (422 lb)   Height: 5' 4\" (1.626 m)     Physical Exam  Vitals signs and nursing note reviewed. Constitutional:       General: She is not in acute distress. Appearance: She is well-developed. She is not diaphoretic. HENT:      Head: Normocephalic and atraumatic. Eyes:      Conjunctiva/sclera: Conjunctivae normal.   Neck:      Musculoskeletal: Normal range of motion and neck supple. Cardiovascular:      Rate and Rhythm: Normal rate and regular rhythm. Heart sounds: Normal heart sounds. Pulmonary:      Effort: Pulmonary effort is normal. No respiratory distress. Breath sounds: Normal breath sounds. Chest:      Chest wall: No tenderness. Abdominal:      General: Bowel sounds are normal. There is no distension. Palpations: Abdomen is soft. Tenderness: There is no abdominal tenderness. There is no guarding or rebound. Musculoskeletal:         General: No deformity. Lumbar back: She exhibits tenderness. Comments: Right Lower  paralumbar reproducible tenderness to palpation. No Lumbar midline TTP. No other midline vertebral bony point tenderness or step-off. No foot drop. Distal sensation intact bilaterally, normal gait, no saddle anesthesia. Bilateral DP 2+. + right  straight leg raise. Skin:     General: Skin is warm and dry. Neurological:      Mental Status: She is alert and oriented to person, place, and time. Deep Tendon Reflexes: Reflexes are normal and symmetric. Diagnostic Study Results     Labs -     Recent Results (from the past 12 hour(s))   URINALYSIS W/ RFLX MICROSCOPIC    Collection Time: 03/16/21 10:49 AM   Result Value Ref Range    Color YELLOW      Appearance CLEAR      Specific gravity 1.022 1.005 - 1.030      pH (UA) 5.5 5.0 - 8.0      Protein Negative NEG mg/dL    Glucose Negative NEG mg/dL    Ketone Negative NEG mg/dL    Bilirubin Negative NEG      Blood Negative NEG      Urobilinogen 0.2 0.2 - 1.0 EU/dL    Nitrites Negative NEG      Leukocyte Esterase Negative NEG     HCG URINE, QL    Collection Time: 03/16/21 10:49 AM   Result Value Ref Range    HCG urine, QL Negative NEG         Radiologic Studies -   No orders to display     CT Results  (Last 48 hours)    None        CXR Results  (Last 48 hours)    None            Medical Decision Making   I am the first provider for this patient. I reviewed the vital signs, available nursing notes, past medical history, past surgical history, family history and social history. Vital Signs-Reviewed the patient's vital signs.       Records Reviewed: Nursing Notes and Old Medical Records     Procedures: None   Procedures    Provider Notes (Medical Decision Making):       Differential Diagnosis: Musculoskeletal pain, myofascial strain/sprain, muscle spasm, spondylolisthesis, spondylosis, DJD, OA, sciatica, cauda equina syndrome, UTI, pregnancy     Plan: Will order UA and urine preg       11:54 AM  History and physical exam consistent with sciatica. No red flag signs or emergent need for imaging at this time. No midline tenderness. Will discharge home with pain medication and muscle relaxants. Have stressed the importance of stretching and hot baths with Epsom salt. Have advised orthopedic follow-up. Patient agrees with the plan and management and states all questions have been thoroughly answered and there are no more remaining questions. MED RECONCILIATION:  No current facility-administered medications for this encounter. Current Outpatient Medications   Medication Sig    ketorolac (TORADOL) 10 mg tablet Take 1 Tab by mouth every six (6) hours as needed for Pain.  acetaminophen (TYLENOL) 325 mg tablet Take 2 Tabs by mouth every four (4) hours as needed for Pain.  diazePAM (Valium) 5 mg tablet Take 1 Tab by mouth three (3) times daily as needed for Anxiety (spasm). Max Daily Amount: 15 mg.  LORazepam (ATIVAN) 1 mg tablet Take 0.5 Tabs by mouth every eight (8) hours as needed for Anxiety. Max Daily Amount: 1.5 mg.    cyclobenzaprine (FLEXERIL) 10 mg tablet Take 1 Tab by mouth three (3) times daily as needed for Muscle Spasm(s).  ibuprofen (MOTRIN) 800 mg tablet Take 1 Tab by mouth every eight (8) hours as needed for Pain. Disposition:  Home     DISCHARGE NOTE:   Pt has been reexamined. Patient has no new complaints, changes, or physical findings. Care plan outlined and precautions discussed. Results of workup were reviewed with the patient. All medications were reviewed with the patient. All of pt's questions and concerns were addressed. Patient was instructed and agrees to follow up with ortho as well as to return to the ED upon further deterioration. Patient is ready to go home.     Follow-up Information     Follow up With Specialties Details Why Contact Info    [facility] EMERGENCY DEPT Emergency Medicine  As needed [address] [facility] - [facility] Orthopedic Surgery Schedule an appointment as soon as possible for a visit   [place], [address] [address]  [phone]          Current Discharge Medication List      START taking these medications    Details   ketorolac (TORADOL) 10 mg tablet Take 1 Tab by mouth every six (6) hours as needed for Pain. Qty: 20 Tab, Refills: 0      acetaminophen (TYLENOL) 325 mg tablet Take 2 Tabs by mouth every four (4) hours as needed for Pain. Qty: 20 Tab, Refills: 0      diazePAM (Valium) 5 mg tablet Take 1 Tab by mouth three (3) times daily as needed for Anxiety (spasm). Max Daily Amount: 15 mg.  Qty: 20 Tab, Refills: 0    Associated Diagnoses: Sciatica of right side                 Diagnosis     Clinical Impression:   1. Sciatica of right side          \"Please note that this dictation was completed with [employer], the computer voice recognition software. Quite often unanticipated grammatical, syntax, homophones, and other interpretive errors are inadvertently transcribed by the computer software. Please disregard these errors. Please excuse any errors that have escaped final proofreading. \"

## 2021-03-16 NOTE — Clinical Note
[facility] EMERGENCY DEPT 
[address] [id number] [phone] Work/School Note Date: 3/16/2021 To Whom It May concern: 
 
Jason Morris was seen and treated today in the emergency room by the following provider(s): 
Attending Provider: Mecca Salas MD 
Physician Assistant: MAXIMILIAN Calderon. Jason Morris is excused from work/school on 3/16/2021 through 3/19/2021. She is medically clear to return to work/school on 3/20/2021. Sincerely, MAXIMILIAN Holguin

## 2021-03-16 NOTE — ED TRIAGE NOTES
The patient presents for evaluation of right lower back pain x one week. She describes the pain at \"burning\". Denies abdominal pain, nausea, vomiting, or diarrhea.

## 2021-06-02 ENCOUNTER — HOSPITAL ENCOUNTER (EMERGENCY)
Age: 26
Discharge: HOME | End: 2021-06-02
Attending: EMERGENCY MEDICINE
Payer: MEDICAID

## 2021-06-02 VITALS
SYSTOLIC BLOOD PRESSURE: 158 MMHG | RESPIRATION RATE: 20 BRPM | HEART RATE: 109 BPM | WEIGHT: 293 LBS | BODY MASS INDEX: 50.02 KG/M2 | HEIGHT: 64 IN | OXYGEN SATURATION: 95 % | TEMPERATURE: 99.1 F | DIASTOLIC BLOOD PRESSURE: 77 MMHG

## 2021-06-02 DIAGNOSIS — R60.0 BILATERAL LEG EDEMA: Primary | ICD-10-CM

## 2021-06-02 PROCEDURE — 99283 EMERGENCY DEPT VISIT LOW MDM: CPT

## 2021-06-02 PROCEDURE — 74011250637 HC RX REV CODE- 250/637: Performed by: EMERGENCY MEDICINE

## 2021-06-02 RX ORDER — FUROSEMIDE 40 MG/1
20 TABLET ORAL
Status: COMPLETED | OUTPATIENT
Start: 2021-06-02 | End: 2021-06-02

## 2021-06-02 RX ORDER — FUROSEMIDE 20 MG/1
20 TABLET ORAL DAILY
Qty: 10 TABLET | Refills: 0 | Status: SHIPPED | OUTPATIENT
Start: 2021-06-02 | End: 2021-06-12

## 2021-06-02 RX ADMIN — FUROSEMIDE 20 MG: 40 TABLET ORAL at 21:51

## 2021-06-02 NOTE — LETTER
[facility] EMERGENCY DEPT 
[address] [id number] [phone] Work/School Note Date: 6/2/2021 To Whom It May concern: 
 
Ty Oreilly was seen and treated today in the emergency room by the following provider(s): 
Attending Provider: Airam Esteban MD.   
 
Ty Oreilly is excused from work/school on 06/02/21 and 06/03/21. She is medically clear to return to work/school on 6/4/2021. Sincerely, Kurt Anderson RN

## 2021-06-03 NOTE — ED PROVIDER NOTES
EMERGENCY DEPARTMENT HISTORY AND PHYSICAL EXAM    9:33 PM  Date: 6/2/2021  Patient Name: Brad Le    History of Presenting Illness     Chief Complaint   Patient presents with    Leg Swelling        History Provided By: Patient    HPI: Brad Le is a 26 y.o. female with history of chronic bilateral lower extremity edema. Patient is presenting with worsening of her leg swelling for the past couple of days. She states that she was off of work and when she returned to work she started getting worse. Feels generally tightness in her legs but no pain. She used to be on diuretics however she lost her insurance and has not seen a primary care doctor in a long time so she could not refill her prescription. No history of previous VTE. No fever or rash. No chest pain no respiratory symptoms. Not on birth control pills. Location:  Severity:  Timing/course: Onset/Duration:     PCP: None    Past History     Past Medical History:  No past medical history on file. Past Surgical History:  Past Surgical History:   Procedure Laterality Date    HX APPENDECTOMY         Family History:  No family history on file. Social History:  Social History     Tobacco Use    Smoking status: Current Every Day Smoker    Smokeless tobacco: Never Used   Substance Use Topics    Alcohol use: Not Currently    Drug use: Not Currently       Allergies:  No Known Allergies    Review of Systems   Review of Systems   Cardiovascular: Positive for leg swelling. All other systems reviewed and are negative. Physical Exam     Patient Vitals for the past 12 hrs:   Temp Pulse Resp BP SpO2   06/02/21 2119 99.1 °F (37.3 °C) (!) 109 20 (!) 170/84 96 %       Physical Exam  Vitals and nursing note reviewed. Constitutional:       Appearance: She is obese. HENT:      Head: Normocephalic and atraumatic. Eyes:      Extraocular Movements: Extraocular movements intact. Cardiovascular:      Rate and Rhythm: Normal rate. Pulses: Normal pulses. Pulmonary:      Effort: Pulmonary effort is normal. No respiratory distress. Musculoskeletal:         General: No deformity. Normal range of motion. Cervical back: Normal range of motion and neck supple. Right lower leg: Edema present. Left lower leg: Edema present. Skin:     General: Skin is warm and dry. Neurological:      General: No focal deficit present. Mental Status: She is alert and oriented to person, place, and time. Psychiatric:         Mood and Affect: Mood normal.         Behavior: Behavior normal.         Diagnostic Study Results     Labs -  No results found for this or any previous visit (from the past 12 hour(s)). Radiologic Studies -   No results found. Medical Decision Making     ED Course: Progress Notes, Reevaluation, and Consults:    9:33 PM Initial assessment performed. The patients presenting problems have been discussed, and they/their family are in agreement with the care plan formulated and outlined with them. I have encouraged them to ask questions as they arise throughout their visit. Provider Notes (Medical Decision Making): 32-year-old female with history of lower extremity edema presenting for worsening of her swelling. Well-appearing on exam and not in distress. She likely has lymphedema. No tenderness to palpation over her calf and they appear symmetrical.  No signs of cellulitis. I discussed with the patient that she can get an ultrasound as an outpatient tomorrow since it is after hours now but I have low suspicion for DVT. We will refill her prescription of diuretics and have her follow-up with [facility]. She was also provided with strict return precautions. Procedures:     Critical Care Time:     Vital Signs-Reviewed the patient's vital signs. Reviewed pt's pulse ox reading. EKG: Interpreted by the EP.    Time Interpreted:    Rate:    Rhythm:    Interpretation:   Comparison: Records Reviewed: Nursing Notes (Time of Review: 9:33 PM)  -I am the first provider for this patient.  -I reviewed the vital signs, available nursing notes, past medical history, past surgical history, family history and social history. Current Outpatient Medications   Medication Sig Dispense Refill    ibuprofen (MOTRIN) 800 mg tablet Take 1 Tab by mouth every eight (8) hours as needed for Pain. 30 Tab 1    ketorolac (TORADOL) 10 mg tablet Take 1 Tab by mouth every six (6) hours as needed for Pain. (Patient not taking: Reported on 6/2/2021) 20 Tab 0    acetaminophen (TYLENOL) 325 mg tablet Take 2 Tabs by mouth every four (4) hours as needed for Pain. (Patient not taking: Reported on 6/2/2021) 20 Tab 0    diazePAM (Valium) 5 mg tablet Take 1 Tab by mouth three (3) times daily as needed for Anxiety (spasm). Max Daily Amount: 15 mg. (Patient not taking: Reported on 6/2/2021) 20 Tab 0    LORazepam (ATIVAN) 1 mg tablet Take 0.5 Tabs by mouth every eight (8) hours as needed for Anxiety. Max Daily Amount: 1.5 mg. (Patient not taking: Reported on 6/2/2021) 30 Tab 0    cyclobenzaprine (FLEXERIL) 10 mg tablet Take 1 Tab by mouth three (3) times daily as needed for Muscle Spasm(s). (Patient not taking: Reported on 6/2/2021) 30 Tab 1        Clinical Impression     Clinical Impression: No diagnosis found. Disposition: dc      This note was dictated utilizing voice recognition software which may lead to typographical errors. I apologize in advance if the situation occurs. If questions arise please do not hesitate to contact me or call our department.     Imer Calzada MD  9:33 PM

## 2021-06-03 NOTE — ED TRIAGE NOTES
Pt to ED with complaints of bilateral lower extremity pain and swelling. States leg swelling is Chronic but worse today as she returned to work today and has been standing all day. Pt reports being prescribed Diuretics but is currently out of prescription.

## 2021-06-04 ENCOUNTER — HOSPITAL ENCOUNTER (OUTPATIENT)
Dept: VASCULAR SURGERY | Age: 26
Discharge: HOME | End: 2021-06-04
Attending: EMERGENCY MEDICINE
Payer: MEDICAID

## 2021-06-04 DIAGNOSIS — R60.0 BILATERAL LEG EDEMA: ICD-10-CM

## 2021-06-04 PROCEDURE — 93970 EXTREMITY STUDY: CPT

## 2021-07-19 ENCOUNTER — APPOINTMENT (OUTPATIENT)
Dept: VASCULAR SURGERY | Age: 26
End: 2021-07-19
Attending: EMERGENCY MEDICINE
Payer: MEDICAID

## 2021-07-19 ENCOUNTER — HOSPITAL ENCOUNTER (EMERGENCY)
Age: 26
Discharge: HOME | End: 2021-07-19
Attending: EMERGENCY MEDICINE
Payer: MEDICAID

## 2021-07-19 ENCOUNTER — APPOINTMENT (OUTPATIENT)
Dept: GENERAL RADIOLOGY | Age: 26
End: 2021-07-19
Attending: EMERGENCY MEDICINE
Payer: MEDICAID

## 2021-07-19 VITALS
WEIGHT: 293 LBS | BODY MASS INDEX: 50.02 KG/M2 | TEMPERATURE: 98.3 F | RESPIRATION RATE: 18 BRPM | HEIGHT: 64 IN | DIASTOLIC BLOOD PRESSURE: 88 MMHG | HEART RATE: 89 BPM | OXYGEN SATURATION: 95 % | SYSTOLIC BLOOD PRESSURE: 173 MMHG

## 2021-07-19 DIAGNOSIS — R06.02 SOB (SHORTNESS OF BREATH): ICD-10-CM

## 2021-07-19 DIAGNOSIS — R60.0 BILATERAL LEG EDEMA: Primary | ICD-10-CM

## 2021-07-19 LAB
ALBUMIN SERPL-MCNC: 3.5 G/DL (ref 3.4–5)
ALBUMIN/GLOB SERPL: 0.9 {RATIO} (ref 0.8–1.7)
ALP SERPL-CCNC: 52 U/L (ref 45–117)
ALT SERPL-CCNC: 72 U/L (ref 13–56)
ANION GAP SERPL CALC-SCNC: 5 MMOL/L (ref 3–18)
AST SERPL-CCNC: 43 U/L (ref 10–38)
ATRIAL RATE: 108 BPM
BASOPHILS # BLD: 0 K/UL (ref 0–0.1)
BASOPHILS NFR BLD: 0 % (ref 0–2)
BILIRUB SERPL-MCNC: 0.4 MG/DL (ref 0.2–1)
BNP SERPL-MCNC: 54 PG/ML (ref 0–450)
BUN SERPL-MCNC: 15 MG/DL (ref 7–18)
BUN/CREAT SERPL: 14 (ref 12–20)
CALCIUM SERPL-MCNC: 8.7 MG/DL (ref 8.5–10.1)
CALCULATED P AXIS, ECG09: 73 DEGREES
CALCULATED R AXIS, ECG10: 73 DEGREES
CALCULATED T AXIS, ECG11: 16 DEGREES
CHLORIDE SERPL-SCNC: 108 MMOL/L (ref 100–111)
CO2 SERPL-SCNC: 29 MMOL/L (ref 21–32)
CREAT SERPL-MCNC: 1.06 MG/DL (ref 0.6–1.3)
D DIMER PPP FEU-MCNC: 0.59 UG/ML(FEU)
DIAGNOSIS, 93000: NORMAL
DIFFERENTIAL METHOD BLD: ABNORMAL
EOSINOPHIL # BLD: 0.3 K/UL (ref 0–0.4)
EOSINOPHIL NFR BLD: 3 % (ref 0–5)
ERYTHROCYTE [DISTWIDTH] IN BLOOD BY AUTOMATED COUNT: 13.2 % (ref 11.6–14.5)
GLOBULIN SER CALC-MCNC: 3.9 G/DL (ref 2–4)
GLUCOSE SERPL-MCNC: 124 MG/DL (ref 74–99)
HCG SERPL QL: NEGATIVE
HCT VFR BLD AUTO: 39.2 % (ref 35–45)
HGB BLD-MCNC: 11.9 G/DL (ref 12–16)
LYMPHOCYTES # BLD: 2.5 K/UL (ref 0.9–3.6)
LYMPHOCYTES NFR BLD: 23 % (ref 21–52)
MCH RBC QN AUTO: 28.3 PG (ref 24–34)
MCHC RBC AUTO-ENTMCNC: 30.4 G/DL (ref 31–37)
MCV RBC AUTO: 93.1 FL (ref 74–97)
MONOCYTES # BLD: 0.8 K/UL (ref 0.05–1.2)
MONOCYTES NFR BLD: 7 % (ref 3–10)
NEUTS SEG # BLD: 7.2 K/UL (ref 1.8–8)
NEUTS SEG NFR BLD: 66 % (ref 40–73)
P-R INTERVAL, ECG05: 150 MS
PLATELET # BLD AUTO: 239 K/UL (ref 135–420)
PMV BLD AUTO: 9.4 FL (ref 9.2–11.8)
POTASSIUM SERPL-SCNC: 4 MMOL/L (ref 3.5–5.5)
PROT SERPL-MCNC: 7.4 G/DL (ref 6.4–8.2)
Q-T INTERVAL, ECG07: 334 MS
QRS DURATION, ECG06: 92 MS
QTC CALCULATION (BEZET), ECG08: 447 MS
RBC # BLD AUTO: 4.21 M/UL (ref 4.2–5.3)
SODIUM SERPL-SCNC: 142 MMOL/L (ref 136–145)
TROPONIN I SERPL-MCNC: <0.02 NG/ML (ref 0–0.04)
VENTRICULAR RATE, ECG03: 108 BPM
WBC # BLD AUTO: 10.9 K/UL (ref 4.6–13.2)

## 2021-07-19 PROCEDURE — 83880 ASSAY OF NATRIURETIC PEPTIDE: CPT

## 2021-07-19 PROCEDURE — 93970 EXTREMITY STUDY: CPT

## 2021-07-19 PROCEDURE — 84484 ASSAY OF TROPONIN QUANT: CPT

## 2021-07-19 PROCEDURE — 93005 ELECTROCARDIOGRAM TRACING: CPT

## 2021-07-19 PROCEDURE — 80053 COMPREHEN METABOLIC PANEL: CPT

## 2021-07-19 PROCEDURE — 74011250637 HC RX REV CODE- 250/637: Performed by: EMERGENCY MEDICINE

## 2021-07-19 PROCEDURE — 85025 COMPLETE CBC W/AUTO DIFF WBC: CPT

## 2021-07-19 PROCEDURE — 71046 X-RAY EXAM CHEST 2 VIEWS: CPT

## 2021-07-19 PROCEDURE — 84703 CHORIONIC GONADOTROPIN ASSAY: CPT

## 2021-07-19 PROCEDURE — 99285 EMERGENCY DEPT VISIT HI MDM: CPT

## 2021-07-19 PROCEDURE — 85379 FIBRIN DEGRADATION QUANT: CPT

## 2021-07-19 RX ORDER — FUROSEMIDE 40 MG/1
40 TABLET ORAL
Status: COMPLETED | OUTPATIENT
Start: 2021-07-19 | End: 2021-07-19

## 2021-07-19 RX ORDER — HYDROCODONE BITARTRATE AND ACETAMINOPHEN 5; 325 MG/1; MG/1
1 TABLET ORAL
Status: COMPLETED | OUTPATIENT
Start: 2021-07-19 | End: 2021-07-19

## 2021-07-19 RX ORDER — FUROSEMIDE 20 MG/1
20 TABLET ORAL DAILY
Qty: 10 TABLET | Refills: 0 | Status: SHIPPED | OUTPATIENT
Start: 2021-07-19 | End: 2021-07-29

## 2021-07-19 RX ORDER — HYDROCODONE BITARTRATE AND ACETAMINOPHEN 5; 325 MG/1; MG/1
1 TABLET ORAL ONCE
Status: COMPLETED | OUTPATIENT
Start: 2021-07-19 | End: 2021-07-19

## 2021-07-19 RX ADMIN — FUROSEMIDE 40 MG: 40 TABLET ORAL at 13:36

## 2021-07-19 RX ADMIN — HYDROCODONE BITARTRATE AND ACETAMINOPHEN 1 TABLET: 5; 325 TABLET ORAL at 12:52

## 2021-07-19 RX ADMIN — HYDROCODONE BITARTRATE AND ACETAMINOPHEN 1 TABLET: 5; 325 TABLET ORAL at 06:47

## 2021-07-19 NOTE — ED NOTES
12:36 PM     Right Lower Venous    Technically difficult exam due to: patient body habitus, marked edema, inability to withstand probe pressure, tissue density and inability to externally rotate leg. The posterior tibial vein(s) are grossly patent but cannot exclude non-occlusive thrombus. The common femoral, great saphenous, small saphenous, profunda femoral, proximal femoral, mid femoral, distal femoral, popliteal and saphenous femoral junction vein(s) were imaged in the transverse and longitudinal planes. The vessels showed normal color filling and compressibility. Doppler interrogation of the veins showed phasic and spontaneous flow. Unable to visualize the right peroneal veins due to patient body habitus. Left Lower Venous    Technically difficult exam due to: patient body habitus, marked edema, inability to withstand probe pressure, tissue density and inability to externally rotate leg. The posterior tibial vein(s) are grossly patent but cannot exclude non-occlusive thrombus. The common femoral, great saphenous, saphenous femoral junction, small saphenous, profunda femoral, proximal femoral, mid femoral, distal femoral and popliteal vein(s) were imaged in the transverse and longitudinal planes. The vessels showed normal color filling and compressibility. Doppler interrogation of the veins showed phasic and spontaneous flow. The left peroneal veins not visualized due to patient body habitus. · Technically difficult/ limited exam due to: patient body habitus, marked edema, inability to withstand probe pressure, tissue density and inability to externally rotate leg. · No evidence of deep venous thrombosis in the veins visualized of the lower extremities bilaterally. No acute events during her transport to [facility] for her CT scan.   Awaiting CT scan report      12:39 PM 1.  No large central pulmonary embolism identified although the exam is essentially nondiagnostic secondary to poor contrast opacification of the pulmonary arteries and fairly extensive attenuation artifact from patient body habitus.        Signed By: Tad Toribio MD on 7/19/2021 11:24 AM        Patient is asymptomatic thorough discussion with her I did investigate the legs I do not think she has cellulitis or other clinically diagnosable serious disease certainly has edema, do not suspect fracture, negative ultrasound for the second time not showing any DVT. And CTA of the chest without any large central pulmonary embolism. Her chief complaint is leg pain. Did address through her weight. She has seen [facility] previously and was to be restarted on Lasix but Dr. Brii Lafleur to see the results of her labs and so she has an appointment coming up in about 10 days at the end of the month. Dose of Lasix ordered and will discharge with a prescription for this. She is understanding questions were answered and she will be discharged.

## 2021-07-19 NOTE — ED PROVIDER NOTES
EMERGENCY DEPARTMENT HISTORY AND PHYSICAL EXAM    Date: 7/19/2021  Patient Name: Rayo Garza    History of Presenting Illness     Chief Complaint   Patient presents with    Leg Pain    Leg Swelling         History Provided By: Patient    Additional History (Context): Rayo Garza is a 26 y.o. female with obesity who presents with c/o exertional SOB for at least 6 mos and BLE x 2-3yrs. Pain is worsening over past several months. Denies h/o DVT, PE, exogenous estrogen, recent travel/surgery/fxs. Not followed by cardiology. Denies possibility of pregnancy; h/o PCOS. PCP: None    Current Outpatient Medications   Medication Sig Dispense Refill    ketorolac (TORADOL) 10 mg tablet Take 1 Tab by mouth every six (6) hours as needed for Pain. (Patient not taking: Reported on 6/2/2021) 20 Tab 0    acetaminophen (TYLENOL) 325 mg tablet Take 2 Tabs by mouth every four (4) hours as needed for Pain. (Patient not taking: Reported on 6/2/2021) 20 Tab 0    diazePAM (Valium) 5 mg tablet Take 1 Tab by mouth three (3) times daily as needed for Anxiety (spasm). Max Daily Amount: 15 mg. (Patient not taking: Reported on 6/2/2021) 20 Tab 0    LORazepam (ATIVAN) 1 mg tablet Take 0.5 Tabs by mouth every eight (8) hours as needed for Anxiety. Max Daily Amount: 1.5 mg. (Patient not taking: Reported on 6/2/2021) 30 Tab 0    cyclobenzaprine (FLEXERIL) 10 mg tablet Take 1 Tab by mouth three (3) times daily as needed for Muscle Spasm(s). (Patient not taking: Reported on 6/2/2021) 30 Tab 1    ibuprofen (MOTRIN) 800 mg tablet Take 1 Tab by mouth every eight (8) hours as needed for Pain. 30 Tab 1       Past History     Past Medical History:  No past medical history on file. Past Surgical History:  Past Surgical History:   Procedure Laterality Date    HX APPENDECTOMY         Family History:  No family history on file.     Social History:  Social History     Tobacco Use    Smoking status: Current Every Day Smoker    Smokeless tobacco: Never Used   Substance Use Topics    Alcohol use: Not Currently    Drug use: Not Currently       Allergies:  No Known Allergies      Review of Systems   Review of Systems   Constitutional: Negative for fever. HENT: Negative. Eyes: Negative. Respiratory: Positive for shortness of breath. Cardiovascular: Positive for leg swelling. Gastrointestinal: Negative. Endocrine: Negative. Genitourinary: Negative. Musculoskeletal: Negative. Skin: Negative. Allergic/Immunologic: Negative. Neurological: Negative. Negative for weakness and numbness. Hematological: Does not bruise/bleed easily. Psychiatric/Behavioral: Negative. All Other Systems Negative  Physical Exam     Vitals:    07/19/21 0024   BP: (!) 178/98   Pulse: (!) 115   Resp: 18   Temp: 98.5 °F (36.9 °C)   SpO2: 96%   Weight: (!) 219.5 kg (484 lb)   Height: 5' 4\" (1.626 m)     Physical Exam  Vitals and nursing note reviewed. Constitutional:       Appearance: She is well-developed. She is obese. Comments: Morbidly obese. HENT:      Head: Normocephalic and atraumatic. Eyes:      Pupils: Pupils are equal, round, and reactive to light. Neck:      Thyroid: No thyromegaly. Vascular: No JVD. Trachea: No tracheal deviation. Cardiovascular:      Rate and Rhythm: Normal rate and regular rhythm. Heart sounds: Normal heart sounds. No murmur heard. No friction rub. No gallop. Pulmonary:      Effort: Pulmonary effort is normal. No respiratory distress. Breath sounds: Normal breath sounds. No stridor. No wheezing or rales. Chest:      Chest wall: No tenderness. Abdominal:      General: There is no distension. Palpations: Abdomen is soft. There is no mass. Tenderness: There is no abdominal tenderness. There is no guarding or rebound. Musculoskeletal:         General: No tenderness. Right lower leg: Edema present. Left lower leg: Edema present.       Comments: Tautness to each leg. No warmth or redness or open sores. No weeping drainage. DP PT pulses palpable. Lymphadenopathy:      Cervical: No cervical adenopathy. Skin:     General: Skin is warm and dry. Coloration: Skin is not pale. Findings: No erythema or rash. Neurological:      Mental Status: She is alert and oriented to person, place, and time. Psychiatric:         Behavior: Behavior normal.         Thought Content: Thought content normal.            Diagnostic Study Results     Labs -     Recent Results (from the past 12 hour(s))   EKG, 12 LEAD, INITIAL    Collection Time: 07/19/21 12:33 AM   Result Value Ref Range    Ventricular Rate 108 BPM    Atrial Rate 108 BPM    P-R Interval 150 ms    QRS Duration 92 ms    Q-T Interval 334 ms    QTC Calculation (Bezet) 447 ms    Calculated P Axis 73 degrees    Calculated R Axis 73 degrees    Calculated T Axis 16 degrees    Diagnosis       Sinus tachycardia  Otherwise normal ECG  No previous ECGs available     CBC WITH AUTOMATED DIFF    Collection Time: 07/19/21  1:02 AM   Result Value Ref Range    WBC 10.9 4.6 - 13.2 K/uL    RBC 4.21 4.20 - 5.30 M/uL    HGB 11.9 (L) 12.0 - 16.0 g/dL    HCT 39.2 35.0 - 45.0 %    MCV 93.1 74.0 - 97.0 FL    MCH 28.3 24.0 - 34.0 PG    MCHC 30.4 (L) 31.0 - 37.0 g/dL    RDW 13.2 11.6 - 14.5 %    PLATELET 035 641 - 135 K/uL    MPV 9.4 9.2 - 11.8 FL    NEUTROPHILS 66 40 - 73 %    LYMPHOCYTES 23 21 - 52 %    MONOCYTES 7 3 - 10 %    EOSINOPHILS 3 0 - 5 %    BASOPHILS 0 0 - 2 %    ABS. NEUTROPHILS 7.2 1.8 - 8.0 K/UL    ABS. LYMPHOCYTES 2.5 0.9 - 3.6 K/UL    ABS. MONOCYTES 0.8 0.05 - 1.2 K/UL    ABS. EOSINOPHILS 0.3 0.0 - 0.4 K/UL    ABS.  BASOPHILS 0.0 0.0 - 0.1 K/UL    DF AUTOMATED     METABOLIC PANEL, COMPREHENSIVE    Collection Time: 07/19/21  1:02 AM   Result Value Ref Range    Sodium 142 136 - 145 mmol/L    Potassium 4.0 3.5 - 5.5 mmol/L    Chloride 108 100 - 111 mmol/L    CO2 29 21 - 32 mmol/L    Anion gap 5 3.0 - 18 mmol/L Glucose 124 (H) 74 - 99 mg/dL    BUN 15 7.0 - 18 MG/DL    Creatinine 1.06 0.6 - 1.3 MG/DL    BUN/Creatinine ratio 14 12 - 20      GFR est AA >60 >60 ml/min/1.73m2    GFR est non-AA >60 >60 ml/min/1.73m2    Calcium 8.7 8.5 - 10.1 MG/DL    Bilirubin, total 0.4 0.2 - 1.0 MG/DL    ALT (SGPT) 72 (H) 13 - 56 U/L    AST (SGOT) 43 (H) 10 - 38 U/L    Alk. phosphatase 52 45 - 117 U/L    Protein, total 7.4 6.4 - 8.2 g/dL    Albumin 3.5 3.4 - 5.0 g/dL    Globulin 3.9 2.0 - 4.0 g/dL    A-G Ratio 0.9 0.8 - 1.7     D DIMER    Collection Time: 07/19/21  1:02 AM   Result Value Ref Range    D DIMER 0.59 (H) <0.46 ug/ml(FEU)   HCG QL SERUM    Collection Time: 07/19/21  1:02 AM   Result Value Ref Range    HCG, Ql. Negative NEG     TROPONIN I    Collection Time: 07/19/21  1:02 AM   Result Value Ref Range    Troponin-I, QT <0.02 0.0 - 0.045 NG/ML   NT-PRO BNP    Collection Time: 07/19/21  1:02 AM   Result Value Ref Range    NT pro-BNP 54 0 - 450 PG/ML       Radiologic Studies -   XR CHEST PA LAT    (Results Pending)   CTA CHEST W OR W WO CONT    (Results Pending)     CT Results  (Last 48 hours)    None        CXR Results  (Last 48 hours)    None            Medical Decision Making   I am the first provider for this patient. I reviewed the vital signs, available nursing notes, past medical history, past surgical history, family history and social history. Vital Signs-Reviewed the patient's vital signs.     Records Reviewed: Nursing Notes    Procedures:  EKG    Date/Time: 7/19/2021 12:33 AM  Performed by: MAXIMILIAN Forde  Authorized by: MAXIMILIAN Forde     ECG reviewed by ED Physician in the absence of a cardiologist: yes    Interpretation:     Interpretation: abnormal    Rate:     ECG rate:  108    ECG rate assessment: tachycardic    Rhythm:     Rhythm: sinus tachycardia    Ectopy:     Ectopy: none    QRS:     QRS axis:  Normal    QRS intervals:  Normal  Conduction:     Conduction: normal    ST segments:     ST segments: Normal  T waves:     T waves: normal          Provider Notes (Medical Decision Making): Obtain labs chest x-ray EKG. Pt has elevated d-dimer and with tachycardia, c/o SOB, worse w/exertion, will r/o PE and get PVL in the morning. 2:05 AM : Pt care transferred to Dr. Estrella Paez provider. History of patient complaint(s), available diagnostic reports and current treatment plan has been discussed thoroughly. Bedside rounding on patient occured : yes . Intended disposition of patient : TBD tho likely home  Pending diagnostics reports and/or labs (please list): CTA, PVL    MED RECONCILIATION:  No current facility-administered medications for this encounter. Current Outpatient Medications   Medication Sig    ketorolac (TORADOL) 10 mg tablet Take 1 Tab by mouth every six (6) hours as needed for Pain. (Patient not taking: Reported on 6/2/2021)    acetaminophen (TYLENOL) 325 mg tablet Take 2 Tabs by mouth every four (4) hours as needed for Pain. (Patient not taking: Reported on 6/2/2021)    diazePAM (Valium) 5 mg tablet Take 1 Tab by mouth three (3) times daily as needed for Anxiety (spasm). Max Daily Amount: 15 mg. (Patient not taking: Reported on 6/2/2021)    LORazepam (ATIVAN) 1 mg tablet Take 0.5 Tabs by mouth every eight (8) hours as needed for Anxiety. Max Daily Amount: 1.5 mg. (Patient not taking: Reported on 6/2/2021)    cyclobenzaprine (FLEXERIL) 10 mg tablet Take 1 Tab by mouth three (3) times daily as needed for Muscle Spasm(s). (Patient not taking: Reported on 6/2/2021)    ibuprofen (MOTRIN) 800 mg tablet Take 1 Tab by mouth every eight (8) hours as needed for Pain. Disposition:  TBD    Follow-up Information    None         Current Discharge Medication List              Diagnosis     Clinical Impression:   1. Bilateral leg edema    2.  SOB (shortness of breath)

## 2021-07-19 NOTE — ED TRIAGE NOTES
Patient presents to the ED with complaints of bilateral leg swelling that has increasingly getting worse. Patient states she is unable to perform normal ADLs due to this.

## 2021-07-19 NOTE — ED NOTES
Bedside and Verbal shift change report given to Margy Santiago / Ilya Polo RN (oncoming nurse) by Rian Delgado (offgoing nurse). Report included the following information SBAR, ED Summary and MAR.

## 2021-07-19 NOTE — ED NOTES
Assumed care for the patient from [address] [place] [address]. Patient had positive D-dimer. She was tachycardic and satting 95% on room air. She reported that she has been having on and off chest pain but no chest pain today last episode was yesterday and was substernal and sharp. No pleuritic chest pain but has dyspnea on exertion. She is a smoker and overweight. No previous history of VTE. With leg swelling she does have risk for PE so she will need a CTA however she is over the weight limit for our CT scanner. I have reached out to [facility] to transfer the patient to [facility] as they have a bariatric CT scanner however there has been a lot of questioning regarding her measurements and I spoke to the transfer center nurse myself and they said that the measurements were not consistent so they need the patient to be remeasured. I will remeasure the patient myself. 5:15 AM  Widest diameter of the hips is 23.5 inches and on the arms is 22 inches, abdominal height is 20 inches. .  Called the transfer center back and gave the measurements. Spoke to the ED attending Dr. Veda Grissom who had accepted the patient, she will get the CT and come back with a CD, they will follow-up with the daytime attending here for results. Will be signed out to Dr. Rakesh Delgado as well here.

## 2021-09-24 ENCOUNTER — HOSPITAL ENCOUNTER (EMERGENCY)
Age: 26
Discharge: HOME | End: 2021-09-24
Attending: EMERGENCY MEDICINE
Payer: MEDICAID

## 2021-09-24 ENCOUNTER — APPOINTMENT (OUTPATIENT)
Dept: ULTRASOUND IMAGING | Age: 26
End: 2021-09-24
Attending: NURSE PRACTITIONER
Payer: MEDICAID

## 2021-09-24 VITALS
SYSTOLIC BLOOD PRESSURE: 136 MMHG | BODY MASS INDEX: 50.02 KG/M2 | TEMPERATURE: 98.6 F | HEIGHT: 64 IN | HEART RATE: 107 BPM | RESPIRATION RATE: 18 BRPM | OXYGEN SATURATION: 96 % | WEIGHT: 293 LBS | DIASTOLIC BLOOD PRESSURE: 87 MMHG

## 2021-09-24 DIAGNOSIS — B96.89 BV (BACTERIAL VAGINOSIS): ICD-10-CM

## 2021-09-24 DIAGNOSIS — R10.31 ABDOMINAL PAIN, RIGHT LOWER QUADRANT: Primary | ICD-10-CM

## 2021-09-24 DIAGNOSIS — N76.0 BV (BACTERIAL VAGINOSIS): ICD-10-CM

## 2021-09-24 LAB
ALBUMIN SERPL-MCNC: 3.7 G/DL (ref 3.4–5)
ALBUMIN/GLOB SERPL: 0.9 {RATIO} (ref 0.8–1.7)
ALP SERPL-CCNC: 55 U/L (ref 45–117)
ALT SERPL-CCNC: 86 U/L (ref 13–56)
ANION GAP SERPL CALC-SCNC: 5 MMOL/L (ref 3–18)
APPEARANCE UR: ABNORMAL
AST SERPL-CCNC: 64 U/L (ref 10–38)
BACTERIA URNS QL MICRO: ABNORMAL /HPF
BASOPHILS # BLD: 0 K/UL (ref 0–0.1)
BASOPHILS NFR BLD: 0 % (ref 0–2)
BILIRUB SERPL-MCNC: 0.6 MG/DL (ref 0.2–1)
BILIRUB UR QL: NEGATIVE
BUN SERPL-MCNC: 10 MG/DL (ref 7–18)
BUN/CREAT SERPL: 12 (ref 12–20)
CALCIUM SERPL-MCNC: 8.8 MG/DL (ref 8.5–10.1)
CHLORIDE SERPL-SCNC: 99 MMOL/L (ref 100–111)
CO2 SERPL-SCNC: 31 MMOL/L (ref 21–32)
COLOR UR: YELLOW
CREAT SERPL-MCNC: 0.85 MG/DL (ref 0.6–1.3)
DIFFERENTIAL METHOD BLD: ABNORMAL
EOSINOPHIL # BLD: 0.2 K/UL (ref 0–0.4)
EOSINOPHIL NFR BLD: 2 % (ref 0–5)
EPITH CASTS URNS QL MICRO: ABNORMAL /LPF (ref 0–5)
ERYTHROCYTE [DISTWIDTH] IN BLOOD BY AUTOMATED COUNT: 13.8 % (ref 11.6–14.5)
GLOBULIN SER CALC-MCNC: 4.3 G/DL (ref 2–4)
GLUCOSE SERPL-MCNC: 179 MG/DL (ref 74–99)
GLUCOSE UR STRIP.AUTO-MCNC: 100 MG/DL
GRAN CASTS URNS QL MICRO: ABNORMAL /LPF
HCG UR QL: NEGATIVE
HCT VFR BLD AUTO: 41.2 % (ref 35–45)
HGB BLD-MCNC: 12.7 G/DL (ref 12–16)
HGB UR QL STRIP: NEGATIVE
KETONES UR QL STRIP.AUTO: NEGATIVE MG/DL
LEUKOCYTE ESTERASE UR QL STRIP.AUTO: NEGATIVE
LIPASE SERPL-CCNC: 130 U/L (ref 73–393)
LYMPHOCYTES # BLD: 2.3 K/UL (ref 0.9–3.6)
LYMPHOCYTES NFR BLD: 21 % (ref 21–52)
MCH RBC QN AUTO: 28.1 PG (ref 24–34)
MCHC RBC AUTO-ENTMCNC: 30.8 G/DL (ref 31–37)
MCV RBC AUTO: 91.2 FL (ref 78–100)
MONOCYTES # BLD: 0.8 K/UL (ref 0.05–1.2)
MONOCYTES NFR BLD: 7 % (ref 3–10)
NEUTS SEG # BLD: 7.5 K/UL (ref 1.8–8)
NEUTS SEG NFR BLD: 68 % (ref 40–73)
NITRITE UR QL STRIP.AUTO: NEGATIVE
PH UR STRIP: 6 [PH] (ref 5–8)
PLATELET # BLD AUTO: 279 K/UL (ref 135–420)
PMV BLD AUTO: 9.8 FL (ref 9.2–11.8)
POTASSIUM SERPL-SCNC: 4.1 MMOL/L (ref 3.5–5.5)
PROT SERPL-MCNC: 8 G/DL (ref 6.4–8.2)
PROT UR STRIP-MCNC: 30 MG/DL
RBC # BLD AUTO: 4.52 M/UL (ref 4.2–5.3)
RBC #/AREA URNS HPF: NEGATIVE /HPF (ref 0–5)
SERVICE CMNT-IMP: NORMAL
SODIUM SERPL-SCNC: 135 MMOL/L (ref 136–145)
SP GR UR REFRACTOMETRY: 1.02 (ref 1–1.03)
UROBILINOGEN UR QL STRIP.AUTO: 0.2 EU/DL (ref 0.2–1)
WBC # BLD AUTO: 11.1 K/UL (ref 4.6–13.2)
WBC URNS QL MICRO: ABNORMAL /HPF (ref 0–4)
WET PREP GENITAL: NORMAL

## 2021-09-24 PROCEDURE — 81001 URINALYSIS AUTO W/SCOPE: CPT

## 2021-09-24 PROCEDURE — 81025 URINE PREGNANCY TEST: CPT

## 2021-09-24 PROCEDURE — 83690 ASSAY OF LIPASE: CPT

## 2021-09-24 PROCEDURE — 87491 CHLMYD TRACH DNA AMP PROBE: CPT

## 2021-09-24 PROCEDURE — 85025 COMPLETE CBC W/AUTO DIFF WBC: CPT

## 2021-09-24 PROCEDURE — 87210 SMEAR WET MOUNT SALINE/INK: CPT

## 2021-09-24 PROCEDURE — 80053 COMPREHEN METABOLIC PANEL: CPT

## 2021-09-24 PROCEDURE — 99281 EMR DPT VST MAYX REQ PHY/QHP: CPT

## 2021-09-24 PROCEDURE — 76830 TRANSVAGINAL US NON-OB: CPT

## 2021-09-24 PROCEDURE — 96374 THER/PROPH/DIAG INJ IV PUSH: CPT

## 2021-09-24 PROCEDURE — 74011250636 HC RX REV CODE- 250/636: Performed by: PHYSICIAN ASSISTANT

## 2021-09-24 RX ORDER — KETOROLAC TROMETHAMINE 15 MG/ML
15 INJECTION, SOLUTION INTRAMUSCULAR; INTRAVENOUS
Status: COMPLETED | OUTPATIENT
Start: 2021-09-24 | End: 2021-09-24

## 2021-09-24 RX ORDER — METRONIDAZOLE 500 MG/1
500 TABLET ORAL 2 TIMES DAILY
Qty: 14 TABLET | Refills: 0 | Status: SHIPPED | OUTPATIENT
Start: 2021-09-24 | End: 2021-10-01

## 2021-09-24 RX ADMIN — KETOROLAC TROMETHAMINE 15 MG: 15 INJECTION, SOLUTION INTRAMUSCULAR; INTRAVENOUS at 22:25

## 2021-09-24 NOTE — ED PROVIDER NOTES
EMERGENCY DEPARTMENT HISTORY AND PHYSICAL EXAM    Date: 2021  Patient Name: Thuan Harvey    History of Presenting Illness     No chief complaint on file. History Provided By: Patient      Additional History (Context): Thuan Harvey is a 20-year-old female with past medical history significant for morbid obesity, renal colic/kidney stones, bilateral lower extremity edema, anxiety, and prior partial appendectomy. Patient presents to the ER with complaints of right lower quadrant abdominal pain with radiation to the right lower back that started 2 days ago. Associated symptoms include diarrhea and decreased appetite. She denies any nausea, vomiting, fever, or chills. She has chronic low back pain but pain on the right lower back is worse over the last 2 days. She denies any injury, trauma, or fall. Menses are irregular last one being sometime in August.  She denies any prior pregnancies, . Prior appendectomy in childhood and patient states that she had part of her appendix left after the surgery. Sexually active with her spouse and denies any vaginal discharge, itching, or burning. PCP: None    Current Outpatient Medications   Medication Sig Dispense Refill    ketorolac (TORADOL) 10 mg tablet Take 1 Tab by mouth every six (6) hours as needed for Pain. (Patient not taking: Reported on 2021) 20 Tab 0    acetaminophen (TYLENOL) 325 mg tablet Take 2 Tabs by mouth every four (4) hours as needed for Pain. (Patient not taking: Reported on 2021) 20 Tab 0    diazePAM (Valium) 5 mg tablet Take 1 Tab by mouth three (3) times daily as needed for Anxiety (spasm). Max Daily Amount: 15 mg. (Patient not taking: Reported on 2021) 20 Tab 0    LORazepam (ATIVAN) 1 mg tablet Take 0.5 Tabs by mouth every eight (8) hours as needed for Anxiety.  Max Daily Amount: 1.5 mg. (Patient not taking: Reported on 2021) 30 Tab 0    cyclobenzaprine (FLEXERIL) 10 mg tablet Take 1 Tab by mouth three (3) times daily as needed for Muscle Spasm(s). (Patient not taking: Reported on 6/2/2021) 30 Tab 1    ibuprofen (MOTRIN) 800 mg tablet Take 1 Tab by mouth every eight (8) hours as needed for Pain. 30 Tab 1       Past History     Past Medical History:  No past medical history on file. Past Surgical History:  Past Surgical History:   Procedure Laterality Date    HX APPENDECTOMY         Family History:  No family history on file. Social History:  Social History     Tobacco Use    Smoking status: Current Every Day Smoker    Smokeless tobacco: Never Used   Substance Use Topics    Alcohol use: Not Currently    Drug use: Not Currently       Allergies:  No Known Allergies      Review of Systems     Review of Systems   Constitutional: Negative for chills and fever. HENT: Negative for nasal congestion, sore throat, rhinorrhea  Eyes: Negative. Respiratory: negative  cough and negative for shortness of breath. Cardiovascular: Negative for chest pain and palpitations. Gastrointestinal: Positive for right lower quadrant for abdominal pain, Negative for constipation, diarrhea,  nausea and vomiting. Genitourinary: Negative for difficulty urinating, hematuria, and flank pain. Musculoskeletal: Negative for back pain. Negative for gait problem and neck pain. Skin: Negative for rash. Allergic/Immunologic: Negative. Neurological: Negative for dizziness, weakness, numbness and headaches. Psychiatric/Behavioral: Negative. All other systems reviewed and are negative. All Other Systems Negative  Physical Exam     Vitals:    09/24/21 1636   BP: 136/87   Pulse: (!) 107   Resp: 18   Temp: 98.6 °F (37 °C)   SpO2: 96%   Weight: (!) 223.5 kg (492 lb 11.2 oz)   Height: 5' 4\" (1.626 m)     Physical Exam  Vitals and nursing note reviewed. Exam conducted with a chaperone present. Constitutional:       General: She is not in acute distress. Appearance: Normal appearance. She is obese.  She is not ill-appearing, toxic-appearing or diaphoretic. HENT:      Head: Normocephalic and atraumatic. Nose: Nose normal.   Eyes:      General: Lids are normal. Vision grossly intact. No scleral icterus. Conjunctiva/sclera: Conjunctivae normal.   Cardiovascular:      Rate and Rhythm: Normal rate and regular rhythm. Pulses: Normal pulses. Heart sounds: Normal heart sounds. No murmur heard. Pulmonary:      Effort: Pulmonary effort is normal. No respiratory distress. Breath sounds: Normal breath sounds. No stridor. No wheezing, rhonchi or rales. Chest:      Chest wall: No tenderness. Abdominal:      General: There is no distension. Palpations: Abdomen is soft. Tenderness: There is abdominal tenderness in the right lower quadrant. There is no right CVA tenderness, left CVA tenderness, guarding or rebound. Genitourinary:     Vagina: No signs of injury. No vaginal discharge, erythema, tenderness, bleeding or lesions. Uterus: Not tender. Adnexa:         Right: Tenderness present. Musculoskeletal:         General: Normal range of motion. Cervical back: Full passive range of motion without pain, normal range of motion and neck supple. Right lower leg: Edema present. Left lower leg: Edema present. Skin:     General: Skin is warm and dry. Capillary Refill: Capillary refill takes less than 2 seconds. Neurological:      General: No focal deficit present. Mental Status: She is alert and oriented to person, place, and time. Psychiatric:         Mood and Affect: Mood normal.         Behavior: Behavior normal. Behavior is cooperative.            Diagnostic Study Results     Labs -     Recent Results (from the past 12 hour(s))   URINALYSIS W/ RFLX MICROSCOPIC    Collection Time: 09/24/21  4:33 PM   Result Value Ref Range    Color YELLOW      Appearance CLOUDY      Specific gravity 1.019 1.005 - 1.030      pH (UA) 6.0 5.0 - 8.0      Protein 30 (A) NEG mg/dL    Glucose 100 (A) NEG mg/dL    Ketone Negative NEG mg/dL    Bilirubin Negative NEG      Blood Negative NEG      Urobilinogen 0.2 0.2 - 1.0 EU/dL    Nitrites Negative NEG      Leukocyte Esterase Negative NEG     HCG URINE, QL    Collection Time: 09/24/21  4:33 PM   Result Value Ref Range    HCG urine, QL Negative NEG     URINE MICROSCOPIC ONLY    Collection Time: 09/24/21  4:33 PM   Result Value Ref Range    WBC 0 to 3 0 - 4 /hpf    RBC Negative 0 - 5 /hpf    Epithelial cells 2+ 0 - 5 /lpf    Bacteria FEW (A) NEG /hpf    Granular cast 0 to 1 NEG /lpf   CBC WITH AUTOMATED DIFF    Collection Time: 09/24/21  6:15 PM   Result Value Ref Range    WBC 11.1 4.6 - 13.2 K/uL    RBC 4.52 4.20 - 5.30 M/uL    HGB 12.7 12.0 - 16.0 g/dL    HCT 41.2 35.0 - 45.0 %    MCV 91.2 78.0 - 100.0 FL    MCH 28.1 24.0 - 34.0 PG    MCHC 30.8 (L) 31.0 - 37.0 g/dL    RDW 13.8 11.6 - 14.5 %    PLATELET 119 565 - 829 K/uL    MPV 9.8 9.2 - 11.8 FL    NEUTROPHILS 68 40 - 73 %    LYMPHOCYTES 21 21 - 52 %    MONOCYTES 7 3 - 10 %    EOSINOPHILS 2 0 - 5 %    BASOPHILS 0 0 - 2 %    ABS. NEUTROPHILS 7.5 1.8 - 8.0 K/UL    ABS. LYMPHOCYTES 2.3 0.9 - 3.6 K/UL    ABS. MONOCYTES 0.8 0.05 - 1.2 K/UL    ABS. EOSINOPHILS 0.2 0.0 - 0.4 K/UL    ABS.  BASOPHILS 0.0 0.0 - 0.1 K/UL    DF AUTOMATED     WET PREP    Collection Time: 09/24/21  6:32 PM    Specimen: Vagina   Result Value Ref Range    Special Requests: NO SPECIAL REQUESTS      Wet prep FEW  CLUE CELLS PRESENT        Wet prep NO TRICHOMONAS SEEN      Wet prep NO YEAST SEEN     LIPASE    Collection Time: 09/24/21  7:41 PM   Result Value Ref Range    Lipase 130 73 - 734 U/L   METABOLIC PANEL, COMPREHENSIVE    Collection Time: 09/24/21  7:41 PM   Result Value Ref Range    Sodium 135 (L) 136 - 145 mmol/L    Potassium 4.1 3.5 - 5.5 mmol/L    Chloride 99 (L) 100 - 111 mmol/L    CO2 31 21 - 32 mmol/L    Anion gap 5 3.0 - 18 mmol/L    Glucose 179 (H) 74 - 99 mg/dL    BUN 10 7.0 - 18 MG/DL    Creatinine 0.85 0.6 - 1.3 MG/DL BUN/Creatinine ratio 12 12 - 20      GFR est AA >60 >60 ml/min/1.73m2    GFR est non-AA >60 >60 ml/min/1.73m2    Calcium 8.8 8.5 - 10.1 MG/DL    Bilirubin, total 0.6 0.2 - 1.0 MG/DL    ALT (SGPT) 86 (H) 13 - 56 U/L    AST (SGOT) 64 (H) 10 - 38 U/L    Alk. phosphatase 55 45 - 117 U/L    Protein, total 8.0 6.4 - 8.2 g/dL    Albumin 3.7 3.4 - 5.0 g/dL    Globulin 4.3 (H) 2.0 - 4.0 g/dL    A-G Ratio 0.9 0.8 - 1.7         Radiologic Studies -   US PELV NON OB W TV W DOPPLER    (Results Pending)     CT Results  (Last 48 hours)    None        CXR Results  (Last 48 hours)    None            Medical Decision Making   I am the first provider for this patient. I reviewed the vital signs, available nursing notes, past medical history, past surgical history, family history and social history. Vital Signs-Reviewed the patient's vital signs. Records Reviewed: Nursing notes, old medical records and any previous labs, imaging, visits, consultations pertinent to patient care    Procedures:  Procedures      ED Course: Progress Notes, Reevaluation, and Consults:  5:27 PM  Initial assessment performed. The patients presenting problems have been discussed, and they/their family are in agreement with the care plan formulated and outlined with them. I have encouraged them to ask questions as they arise throughout their visit.    9:09 PM : Pt care transferred to [place], [place]- ED provider. History of patient complaint(s), available diagnostic reports and current treatment plan has been discussed thoroughly. Bedside rounding on patient occured : no . Intended disposition of patient :TBD  Pending diagnostics reports and/or labs (please list): Ultrasound. Patient will ultimately need transfer for bariatric CT scan and if she continues to refuse this we will have her sign an informed refusal as the work-up will be incomplete without a CT scan of the abdomen and pelvis.       Provider Notes (Medical Decision Making): Differential diagnosis: Appendicitis, ovarian torsion, UTI/pyelonephritis, renal colic/kidney stone, PID/tubo-ovarian abscess, ovarian cyst      Patient presents ambulatory in no acute distress, well-hydrated, non-toxic in appearance, with slight tachycardia but otherwise normal vitals. Tenderness over the right lower quadrant diffusely the physical exam is limited by body habitus. Patient states she had an appendectomy in childhood and apparently she has part of her appendix still present. No other prior abdominal surgeries. Will obtain appropriate studies to evaluate patient's complaints and treat symptomatically. Will disposition after reassessment assuming no clinical change or worsening and appropriate response to symptomatic treatment. Patient will need a CT scan of her abdomen and pelvis due to her history of partial appendectomy? As well as renal colic. However, due to bariatric needs we do not have CT scan capabilities at this facility. I discussed with the patient transferring her to a facility with a bariatric CT scan and while she understands this necessity, she is refusing to be transported to another facility. She states that when she was transferred previously for the same reason, the transportation fees were not covered by her insurance. She prefers to drive herself to [facility], which has bariatric capabilities. We will proceed with the work-up for her abdominal pain including pelvic examination, vaginal cultures, labs, urinalysis, and ultrasound at this time. MED RECONCILIATION:  No current facility-administered medications for this encounter. Current Outpatient Medications   Medication Sig    ketorolac (TORADOL) 10 mg tablet Take 1 Tab by mouth every six (6) hours as needed for Pain. (Patient not taking: Reported on 6/2/2021)    acetaminophen (TYLENOL) 325 mg tablet Take 2 Tabs by mouth every four (4) hours as needed for Pain.  (Patient not taking: Reported on 6/2/2021)    diazePAM (Valium) 5 mg tablet Take 1 Tab by mouth three (3) times daily as needed for Anxiety (spasm). Max Daily Amount: 15 mg. (Patient not taking: Reported on 6/2/2021)    LORazepam (ATIVAN) 1 mg tablet Take 0.5 Tabs by mouth every eight (8) hours as needed for Anxiety. Max Daily Amount: 1.5 mg. (Patient not taking: Reported on 6/2/2021)    cyclobenzaprine (FLEXERIL) 10 mg tablet Take 1 Tab by mouth three (3) times daily as needed for Muscle Spasm(s). (Patient not taking: Reported on 6/2/2021)    ibuprofen (MOTRIN) 800 mg tablet Take 1 Tab by mouth every eight (8) hours as needed for Pain. Disposition:  Pending        Follow-up Information    None         Current Discharge Medication List                Diagnosis     Clinical Impression:   1. Abdominal pain, right lower quadrant        Dictation disclaimer:  Please note that this dictation was completed with Observe[employer], the computer voice recognition software. Quite often unanticipated grammatical, syntax, homophones, and other interpretive errors are inadvertently transcribed by the computer software. Please disregard these errors. Please excuse any errors that have escaped final proofreading.

## 2021-09-25 NOTE — DISCHARGE INSTRUCTIONS
Sitefly Activation    Thank you for requesting access to Sitefly. Please follow the instructions below to securely access and download your online medical record. Sitefly allows you to send messages to your doctor, view your test results, renew your prescriptions, schedule appointments, and more. How Do I Sign Up? In your internet browser, go to [email or web address]  Click on the First Time User? Click Here link in the Sign In box. You will be redirect to the New Member Sign Up page. Enter your Sitefly Access Code exactly as it appears below. You will not need to use this code after youve completed the sign-up process. If you do not sign up before the expiration date, you must request a new code. Sitefly Access Code: [unfilled] (This is the date your Sitefly access code will )    Enter the last four digits of your Social Security Number (xxxx) and Date of Birth (mm/dd/yyyy) as indicated and click Submit. You will be taken to the next sign-up page. Create a Sitefly ID. This will be your Sitefly login ID and cannot be changed, so think of one that is secure and easy to remember. Create a Sitefly password. You can change your password at any time. Enter your Password Reset Question and Answer. This can be used at a later time if you forget your password. Enter your e-mail address. You will receive e-mail notification when new information is available in [address] Click Sign Up. You can now view and download portions of your medical record. Click the [place] link to download a portable copy of your medical information. Additional Information    If you have questions, please visit the Frequently Asked Questions section of the Sitefly website at [email or web address] Remember, Sitefly is NOT to be used for urgent needs. For medical emergencies, dial 911.

## 2021-09-25 NOTE — ED NOTES
9:00 PM Assumed care of the pt at this time. Discussed with DELIA Mejía concerning patient Mango Hutchinson, standard discussion of reason for visit, HPI, ROS, PE, and current results available. Recommendation for obtaining pending US then re-evaluating the pt. If the pt's US is negative for torsion the pt is recommended to be discharged with instructions for reporting to [facility] [address] scan, as our facility does not have the capability to scan bariatric pts. The pt has needed imaging at this hospital before and was transported there by ambulance. She did not want to be transported by ambulance this time as her insurance would not cover this cost. The plan to have the patient drive over by POV was discussed in great detail with the patient as well as the preceding supervising attending Dr. Oz Knight. Anneliese Juan PA-C     10:49 PM patient's ultrasound has been read, negative for acute process. The patient's wet prep shows BV. We will treat with Flagyl. I discussed all these results with patient at bedside. I explained to the patient that she still needs further work-up to rule out appendicitis. The patient reportedly had an appendectomy several years ago however she has also defending against a surgeon for \" not fully removing the appendix. \"  Previous CT imaging documents a normal appendix so it is unclear if the patient has actually had full appendectomy. The patient agrees to report to [facility] for further evaluation and CT imaging. Turn precautions given. Patient agrees. Anneliese Juan PA-C     Disposition: d/c     Dictation disclaimer:  Please note that this dictation was completed with [employer], the computer voice recognition software. Quite often unanticipated grammatical, syntax, homophones, and other interpretive errors are inadvertently transcribed by the computer software. Please disregard these errors.   Please excuse any errors that have escaped final proofreading.

## 2021-09-28 LAB
C TRACH RRNA SPEC QL NAA+PROBE: NEGATIVE
N GONORRHOEA RRNA SPEC QL NAA+PROBE: NEGATIVE
SPECIMEN SOURCE: NORMAL

## 2021-10-29 ENCOUNTER — HOSPITAL ENCOUNTER (OUTPATIENT)
Dept: LAB | Age: 26
Discharge: HOME | End: 2021-10-29

## 2021-10-29 LAB — SENTARA SPECIMEN COL,SENBCF: NORMAL

## 2021-10-29 PROCEDURE — 99001 SPECIMEN HANDLING PT-LAB: CPT

## 2022-01-30 ENCOUNTER — HOSPITAL ENCOUNTER (INPATIENT)
Age: 27
LOS: 8 days | Discharge: LEFT BEFORE DISCHARGE | DRG: 137 | End: 2022-02-07
Attending: STUDENT IN AN ORGANIZED HEALTH CARE EDUCATION/TRAINING PROGRAM | Admitting: STUDENT IN AN ORGANIZED HEALTH CARE EDUCATION/TRAINING PROGRAM
Payer: MEDICAID

## 2022-01-30 ENCOUNTER — APPOINTMENT (OUTPATIENT)
Dept: CT IMAGING | Age: 27
DRG: 137 | End: 2022-01-30
Attending: INTERNAL MEDICINE
Payer: MEDICAID

## 2022-01-30 ENCOUNTER — APPOINTMENT (OUTPATIENT)
Dept: GENERAL RADIOLOGY | Age: 27
DRG: 137 | End: 2022-01-30
Attending: PHYSICIAN ASSISTANT
Payer: MEDICAID

## 2022-01-30 DIAGNOSIS — J96.01 ACUTE RESPIRATORY FAILURE WITH HYPOXIA (HCC): Primary | ICD-10-CM

## 2022-01-30 DIAGNOSIS — J12.82 PNEUMONIA DUE TO COVID-19 VIRUS: ICD-10-CM

## 2022-01-30 DIAGNOSIS — U07.1 PNEUMONIA DUE TO COVID-19 VIRUS: ICD-10-CM

## 2022-01-30 DIAGNOSIS — N61.1 ABSCESS OF FEMALE BREAST: ICD-10-CM

## 2022-01-30 DIAGNOSIS — F17.200 TOBACCO USE DISORDER: ICD-10-CM

## 2022-01-30 PROBLEM — R09.02 HYPOXIA: Status: ACTIVE | Noted: 2022-01-30

## 2022-01-30 LAB
ALBUMIN SERPL-MCNC: 2.6 G/DL (ref 3.4–5)
ALBUMIN/GLOB SERPL: 0.7 {RATIO} (ref 0.8–1.7)
ALP SERPL-CCNC: 55 U/L (ref 45–117)
ALT SERPL-CCNC: 101 U/L (ref 13–56)
ANION GAP SERPL CALC-SCNC: 7 MMOL/L (ref 3–18)
APTT PPP: 37.3 SEC (ref 23–36.4)
AST SERPL-CCNC: 103 U/L (ref 10–38)
B PERT DNA SPEC QL NAA+PROBE: NOT DETECTED
BASOPHILS # BLD: 0 K/UL (ref 0–0.1)
BASOPHILS NFR BLD: 0 % (ref 0–2)
BILIRUB SERPL-MCNC: 0.5 MG/DL (ref 0.2–1)
BNP SERPL-MCNC: 290 PG/ML (ref 0–450)
BORDETELLA PARAPERTUSSIS PCR, BORPAR: NOT DETECTED
BUN SERPL-MCNC: 9 MG/DL (ref 7–18)
BUN/CREAT SERPL: 11 (ref 12–20)
C PNEUM DNA SPEC QL NAA+PROBE: NOT DETECTED
CALCIUM SERPL-MCNC: 7.3 MG/DL (ref 8.5–10.1)
CHLORIDE SERPL-SCNC: 98 MMOL/L (ref 100–111)
CO2 SERPL-SCNC: 29 MMOL/L (ref 21–32)
COVID-19 RAPID TEST, COVR: NOT DETECTED
CREAT SERPL-MCNC: 0.8 MG/DL (ref 0.6–1.3)
CRP SERPL-MCNC: 8.8 MG/DL (ref 0–0.3)
D DIMER PPP FEU-MCNC: 1.07 UG/ML(FEU)
DIFFERENTIAL METHOD BLD: ABNORMAL
EOSINOPHIL # BLD: 0 K/UL (ref 0–0.4)
EOSINOPHIL NFR BLD: 0 % (ref 0–5)
ERYTHROCYTE [DISTWIDTH] IN BLOOD BY AUTOMATED COUNT: 14.3 % (ref 11.6–14.5)
FERRITIN SERPL-MCNC: 360 NG/ML (ref 8–388)
FLUAV H1 2009 PAND RNA SPEC QL NAA+PROBE: NOT DETECTED
FLUAV H1 RNA SPEC QL NAA+PROBE: NOT DETECTED
FLUAV H3 RNA SPEC QL NAA+PROBE: NOT DETECTED
FLUAV SUBTYP SPEC NAA+PROBE: NOT DETECTED
FLUBV RNA SPEC QL NAA+PROBE: NOT DETECTED
GLOBULIN SER CALC-MCNC: 4 G/DL (ref 2–4)
GLUCOSE BLD STRIP.AUTO-MCNC: 299 MG/DL (ref 70–110)
GLUCOSE BLD STRIP.AUTO-MCNC: 396 MG/DL (ref 70–110)
GLUCOSE SERPL-MCNC: 304 MG/DL (ref 74–99)
HADV DNA SPEC QL NAA+PROBE: NOT DETECTED
HCG SERPL QL: NEGATIVE
HCOV 229E RNA SPEC QL NAA+PROBE: NOT DETECTED
HCOV HKU1 RNA SPEC QL NAA+PROBE: NOT DETECTED
HCOV NL63 RNA SPEC QL NAA+PROBE: NOT DETECTED
HCOV OC43 RNA SPEC QL NAA+PROBE: NOT DETECTED
HCT VFR BLD AUTO: 43.7 % (ref 35–45)
HGB BLD-MCNC: 13.5 G/DL (ref 12–16)
HMPV RNA SPEC QL NAA+PROBE: NOT DETECTED
HPIV1 RNA SPEC QL NAA+PROBE: NOT DETECTED
HPIV2 RNA SPEC QL NAA+PROBE: NOT DETECTED
HPIV3 RNA SPEC QL NAA+PROBE: NOT DETECTED
HPIV4 RNA SPEC QL NAA+PROBE: NOT DETECTED
IMM GRANULOCYTES # BLD AUTO: 0 K/UL (ref 0–0.04)
IMM GRANULOCYTES NFR BLD AUTO: 1 % (ref 0–0.5)
INR PPP: 1.1 (ref 0.8–1.2)
LACTATE BLD-SCNC: 1.37 MMOL/L (ref 0.4–2)
LDH SERPL L TO P-CCNC: 605 U/L (ref 81–234)
LYMPHOCYTES # BLD: 1.2 K/UL (ref 0.9–3.6)
LYMPHOCYTES NFR BLD: 24 % (ref 21–52)
M PNEUMO DNA SPEC QL NAA+PROBE: NOT DETECTED
MAGNESIUM SERPL-MCNC: 1.7 MG/DL (ref 1.6–2.6)
MCH RBC QN AUTO: 28 PG (ref 24–34)
MCHC RBC AUTO-ENTMCNC: 30.9 G/DL (ref 31–37)
MCV RBC AUTO: 90.7 FL (ref 78–100)
MONOCYTES # BLD: 0.2 K/UL (ref 0.05–1.2)
MONOCYTES NFR BLD: 4 % (ref 3–10)
NEUTS SEG # BLD: 3.6 K/UL (ref 1.8–8)
NEUTS SEG NFR BLD: 71 % (ref 40–73)
NRBC # BLD: 0 K/UL (ref 0–0.01)
NRBC BLD-RTO: 0 PER 100 WBC
PLATELET # BLD AUTO: 185 K/UL (ref 135–420)
PMV BLD AUTO: 9.6 FL (ref 9.2–11.8)
POTASSIUM SERPL-SCNC: 3.8 MMOL/L (ref 3.5–5.5)
PROCALCITONIN SERPL-MCNC: 0.08 NG/ML
PROT SERPL-MCNC: 6.6 G/DL (ref 6.4–8.2)
PROTHROMBIN TIME: 14.1 SEC (ref 11.5–15.2)
RBC # BLD AUTO: 4.82 M/UL (ref 4.2–5.3)
RSV RNA SPEC QL NAA+PROBE: NOT DETECTED
RV+EV RNA SPEC QL NAA+PROBE: NOT DETECTED
SARS-COV-2 PCR, COVPCR: DETECTED
SODIUM SERPL-SCNC: 134 MMOL/L (ref 136–145)
SOURCE, COVRS: NORMAL
WBC # BLD AUTO: 5.1 K/UL (ref 4.6–13.2)

## 2022-01-30 PROCEDURE — 84145 PROCALCITONIN (PCT): CPT

## 2022-01-30 PROCEDURE — 71275 CT ANGIOGRAPHY CHEST: CPT

## 2022-01-30 PROCEDURE — 65660000000 HC RM CCU STEPDOWN

## 2022-01-30 PROCEDURE — 85610 PROTHROMBIN TIME: CPT

## 2022-01-30 PROCEDURE — 74011000636 HC RX REV CODE- 636: Performed by: INTERNAL MEDICINE

## 2022-01-30 PROCEDURE — 94640 AIRWAY INHALATION TREATMENT: CPT

## 2022-01-30 PROCEDURE — 74011000250 HC RX REV CODE- 250: Performed by: PHYSICIAN ASSISTANT

## 2022-01-30 PROCEDURE — 74011250636 HC RX REV CODE- 250/636: Performed by: INTERNAL MEDICINE

## 2022-01-30 PROCEDURE — 74011250637 HC RX REV CODE- 250/637: Performed by: INTERNAL MEDICINE

## 2022-01-30 PROCEDURE — 74011250636 HC RX REV CODE- 250/636: Performed by: PHYSICIAN ASSISTANT

## 2022-01-30 PROCEDURE — 84703 CHORIONIC GONADOTROPIN ASSAY: CPT

## 2022-01-30 PROCEDURE — 0202U NFCT DS 22 TRGT SARS-COV-2: CPT

## 2022-01-30 PROCEDURE — 74011250636 HC RX REV CODE- 250/636

## 2022-01-30 PROCEDURE — 74011000250 HC RX REV CODE- 250: Performed by: INTERNAL MEDICINE

## 2022-01-30 PROCEDURE — 71045 X-RAY EXAM CHEST 1 VIEW: CPT

## 2022-01-30 PROCEDURE — 85379 FIBRIN DEGRADATION QUANT: CPT

## 2022-01-30 PROCEDURE — 96375 TX/PRO/DX INJ NEW DRUG ADDON: CPT

## 2022-01-30 PROCEDURE — 83605 ASSAY OF LACTIC ACID: CPT

## 2022-01-30 PROCEDURE — 83880 ASSAY OF NATRIURETIC PEPTIDE: CPT

## 2022-01-30 PROCEDURE — 85025 COMPLETE CBC W/AUTO DIFF WBC: CPT

## 2022-01-30 PROCEDURE — 85730 THROMBOPLASTIN TIME PARTIAL: CPT

## 2022-01-30 PROCEDURE — 82728 ASSAY OF FERRITIN: CPT

## 2022-01-30 PROCEDURE — 87635 SARS-COV-2 COVID-19 AMP PRB: CPT

## 2022-01-30 PROCEDURE — 83615 LACTATE (LD) (LDH) ENZYME: CPT

## 2022-01-30 PROCEDURE — 86140 C-REACTIVE PROTEIN: CPT

## 2022-01-30 PROCEDURE — 74011636637 HC RX REV CODE- 636/637: Performed by: INTERNAL MEDICINE

## 2022-01-30 PROCEDURE — 96374 THER/PROPH/DIAG INJ IV PUSH: CPT

## 2022-01-30 PROCEDURE — 87040 BLOOD CULTURE FOR BACTERIA: CPT

## 2022-01-30 PROCEDURE — 99223 1ST HOSP IP/OBS HIGH 75: CPT | Performed by: INTERNAL MEDICINE

## 2022-01-30 PROCEDURE — 99285 EMERGENCY DEPT VISIT HI MDM: CPT

## 2022-01-30 PROCEDURE — 82962 GLUCOSE BLOOD TEST: CPT

## 2022-01-30 PROCEDURE — 80053 COMPREHEN METABOLIC PANEL: CPT

## 2022-01-30 PROCEDURE — 93005 ELECTROCARDIOGRAM TRACING: CPT

## 2022-01-30 PROCEDURE — 83735 ASSAY OF MAGNESIUM: CPT

## 2022-01-30 RX ORDER — SODIUM CHLORIDE 0.9 % (FLUSH) 0.9 %
5-10 SYRINGE (ML) INJECTION AS NEEDED
Status: DISCONTINUED | OUTPATIENT
Start: 2022-01-30 | End: 2022-02-08 | Stop reason: HOSPADM

## 2022-01-30 RX ORDER — MAGNESIUM SULFATE 100 %
16 CRYSTALS MISCELLANEOUS AS NEEDED
Status: DISCONTINUED | OUTPATIENT
Start: 2022-01-30 | End: 2022-02-08 | Stop reason: HOSPADM

## 2022-01-30 RX ORDER — ACETAMINOPHEN 500 MG
500 TABLET ORAL
Status: DISCONTINUED | OUTPATIENT
Start: 2022-01-30 | End: 2022-02-08 | Stop reason: HOSPADM

## 2022-01-30 RX ORDER — IPRATROPIUM BROMIDE AND ALBUTEROL SULFATE 2.5; .5 MG/3ML; MG/3ML
3 SOLUTION RESPIRATORY (INHALATION)
Status: COMPLETED | OUTPATIENT
Start: 2022-01-30 | End: 2022-01-30

## 2022-01-30 RX ORDER — DEXAMETHASONE SODIUM PHOSPHATE 4 MG/ML
4 INJECTION, SOLUTION INTRA-ARTICULAR; INTRALESIONAL; INTRAMUSCULAR; INTRAVENOUS; SOFT TISSUE
Status: DISCONTINUED | OUTPATIENT
Start: 2022-01-30 | End: 2022-01-30

## 2022-01-30 RX ORDER — BUDESONIDE 0.25 MG/2ML
500 INHALANT ORAL
Status: DISCONTINUED | OUTPATIENT
Start: 2022-01-30 | End: 2022-01-31

## 2022-01-30 RX ORDER — ONDANSETRON 2 MG/ML
INJECTION INTRAMUSCULAR; INTRAVENOUS
Status: COMPLETED
Start: 2022-01-30 | End: 2022-01-30

## 2022-01-30 RX ORDER — IPRATROPIUM BROMIDE AND ALBUTEROL SULFATE 2.5; .5 MG/3ML; MG/3ML
3 SOLUTION RESPIRATORY (INHALATION)
Status: DISCONTINUED | OUTPATIENT
Start: 2022-01-30 | End: 2022-01-31

## 2022-01-30 RX ORDER — DEXTROSE 50 % IN WATER (D50W) INTRAVENOUS SYRINGE
25-50 AS NEEDED
Status: DISCONTINUED | OUTPATIENT
Start: 2022-01-30 | End: 2022-01-30

## 2022-01-30 RX ORDER — DEXAMETHASONE SODIUM PHOSPHATE 10 MG/ML
10 INJECTION INTRAMUSCULAR; INTRAVENOUS EVERY 24 HOURS
Status: DISCONTINUED | OUTPATIENT
Start: 2022-01-30 | End: 2022-01-30

## 2022-01-30 RX ORDER — DEXTROSE MONOHYDRATE 100 MG/ML
125-250 INJECTION, SOLUTION INTRAVENOUS AS NEEDED
Status: DISCONTINUED | OUTPATIENT
Start: 2022-01-30 | End: 2022-02-08 | Stop reason: HOSPADM

## 2022-01-30 RX ORDER — INSULIN LISPRO 100 [IU]/ML
INJECTION, SOLUTION INTRAVENOUS; SUBCUTANEOUS
Status: DISCONTINUED | OUTPATIENT
Start: 2022-01-30 | End: 2022-02-08 | Stop reason: HOSPADM

## 2022-01-30 RX ORDER — ONDANSETRON 2 MG/ML
4 INJECTION INTRAMUSCULAR; INTRAVENOUS
Status: COMPLETED | OUTPATIENT
Start: 2022-01-30 | End: 2022-01-30

## 2022-01-30 RX ORDER — DEXAMETHASONE SODIUM PHOSPHATE 4 MG/ML
4 INJECTION, SOLUTION INTRA-ARTICULAR; INTRALESIONAL; INTRAMUSCULAR; INTRAVENOUS; SOFT TISSUE
Status: COMPLETED | OUTPATIENT
Start: 2022-01-30 | End: 2022-01-30

## 2022-01-30 RX ORDER — DEXAMETHASONE SODIUM PHOSPHATE 4 MG/ML
10 INJECTION, SOLUTION INTRA-ARTICULAR; INTRALESIONAL; INTRAMUSCULAR; INTRAVENOUS; SOFT TISSUE EVERY 12 HOURS
Status: COMPLETED | OUTPATIENT
Start: 2022-01-31 | End: 2022-02-03

## 2022-01-30 RX ORDER — DULOXETIN HYDROCHLORIDE 30 MG/1
30 CAPSULE, DELAYED RELEASE ORAL DAILY
Status: DISCONTINUED | OUTPATIENT
Start: 2022-01-31 | End: 2022-02-08 | Stop reason: HOSPADM

## 2022-01-30 RX ORDER — MORPHINE SULFATE 4 MG/ML
4 INJECTION INTRAVENOUS
Status: COMPLETED | OUTPATIENT
Start: 2022-01-30 | End: 2022-01-30

## 2022-01-30 RX ORDER — ARFORMOTEROL TARTRATE 15 UG/2ML
15 SOLUTION RESPIRATORY (INHALATION)
Status: DISCONTINUED | OUTPATIENT
Start: 2022-01-30 | End: 2022-01-31

## 2022-01-30 RX ORDER — OMEGA-3-ACID ETHYL ESTERS 1 G/1
1 CAPSULE, LIQUID FILLED ORAL 2 TIMES DAILY WITH MEALS
Status: DISCONTINUED | OUTPATIENT
Start: 2022-01-30 | End: 2022-02-08 | Stop reason: HOSPADM

## 2022-01-30 RX ORDER — ONDANSETRON 2 MG/ML
4 INJECTION INTRAMUSCULAR; INTRAVENOUS
Status: DISCONTINUED | OUTPATIENT
Start: 2022-01-30 | End: 2022-02-08 | Stop reason: HOSPADM

## 2022-01-30 RX ORDER — HYDRALAZINE HYDROCHLORIDE 25 MG/1
25 TABLET, FILM COATED ORAL
Status: DISCONTINUED | OUTPATIENT
Start: 2022-01-30 | End: 2022-02-08 | Stop reason: HOSPADM

## 2022-01-30 RX ORDER — ENOXAPARIN SODIUM 100 MG/ML
40 INJECTION SUBCUTANEOUS EVERY 24 HOURS
Status: DISCONTINUED | OUTPATIENT
Start: 2022-01-30 | End: 2022-02-02

## 2022-01-30 RX ORDER — ALBUTEROL SULFATE 0.83 MG/ML
5 SOLUTION RESPIRATORY (INHALATION)
Status: COMPLETED | OUTPATIENT
Start: 2022-01-30 | End: 2022-01-30

## 2022-01-30 RX ORDER — GABAPENTIN 100 MG/1
100 CAPSULE ORAL 3 TIMES DAILY
Status: DISCONTINUED | OUTPATIENT
Start: 2022-01-30 | End: 2022-02-08 | Stop reason: HOSPADM

## 2022-01-30 RX ADMIN — GABAPENTIN 100 MG: 100 CAPSULE ORAL at 17:57

## 2022-01-30 RX ADMIN — IPRATROPIUM BROMIDE AND ALBUTEROL SULFATE 3 ML: .5; 3 SOLUTION RESPIRATORY (INHALATION) at 12:51

## 2022-01-30 RX ADMIN — Medication 10 UNITS: at 22:57

## 2022-01-30 RX ADMIN — IOPAMIDOL 100 ML: 755 INJECTION, SOLUTION INTRAVENOUS at 19:15

## 2022-01-30 RX ADMIN — ARFORMOTEROL TARTRATE 15 MCG: 15 SOLUTION RESPIRATORY (INHALATION) at 20:43

## 2022-01-30 RX ADMIN — MORPHINE SULFATE 4 MG: 4 INJECTION, SOLUTION INTRAMUSCULAR; INTRAVENOUS at 13:49

## 2022-01-30 RX ADMIN — ALBUTEROL SULFATE 5 MG: 2.5 SOLUTION RESPIRATORY (INHALATION) at 12:51

## 2022-01-30 RX ADMIN — ENOXAPARIN SODIUM 40 MG: 100 INJECTION SUBCUTANEOUS at 17:57

## 2022-01-30 RX ADMIN — ONDANSETRON 4 MG: 2 INJECTION INTRAMUSCULAR; INTRAVENOUS at 13:30

## 2022-01-30 RX ADMIN — IPRATROPIUM BROMIDE AND ALBUTEROL SULFATE 3 ML: .5; 3 SOLUTION RESPIRATORY (INHALATION) at 13:29

## 2022-01-30 RX ADMIN — AZITHROMYCIN MONOHYDRATE 500 MG: 500 INJECTION, POWDER, LYOPHILIZED, FOR SOLUTION INTRAVENOUS at 16:50

## 2022-01-30 RX ADMIN — CEFTRIAXONE SODIUM 2 G: 2 INJECTION, POWDER, FOR SOLUTION INTRAMUSCULAR; INTRAVENOUS at 16:44

## 2022-01-30 RX ADMIN — DEXAMETHASONE SODIUM PHOSPHATE 4 MG: 4 INJECTION, SOLUTION INTRAMUSCULAR; INTRAVENOUS at 13:29

## 2022-01-30 RX ADMIN — OMEGA-3-ACID ETHYL ESTERS 1000 MG: 1 CAPSULE, LIQUID FILLED ORAL at 17:57

## 2022-01-30 RX ADMIN — BUDESONIDE 500 MCG: 0.25 SUSPENSION RESPIRATORY (INHALATION) at 20:43

## 2022-01-30 RX ADMIN — IPRATROPIUM BROMIDE AND ALBUTEROL SULFATE 3 ML: .5; 3 SOLUTION RESPIRATORY (INHALATION) at 17:57

## 2022-01-30 RX ADMIN — IPRATROPIUM BROMIDE AND ALBUTEROL SULFATE 3 ML: .5; 3 SOLUTION RESPIRATORY (INHALATION) at 20:43

## 2022-01-30 RX ADMIN — Medication 6 UNITS: at 17:58

## 2022-01-30 RX ADMIN — GABAPENTIN 100 MG: 100 CAPSULE ORAL at 22:57
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
## 2022-01-30 NOTE — ED TRIAGE NOTES
Pt walked into triage c/o SOB, wheezing, diarrhea, decreased appetite, and lose of taste and smell x 1 week. Pt's SpO2 on RA was 89-90% pt placed on 2 L NC and SpO2 is 94%. Pt is able to speak in complete sentences.

## 2022-01-30 NOTE — ED PROVIDER NOTES
EMERGENCY DEPARTMENT HISTORY AND PHYSICAL EXAM    Date: 1/30/2022  Patient Name: Pamela Hendricks    History of Presenting Illness     Chief Complaint   Patient presents with    Wheezing         History Provided By: Patient     Chief Complaint:sob, wheezing, dry mouth, diarrhea, coughing, headaches, body aches, decreased appetite, loss of taste and smell, nausea, vomiting  Duration: January 20/21st  Timing: Gradual  Location: Diffuse body  Quality: Aching  Severity: Moderate to severe  Modifying Factors: Worse after being around multiple known Covid positive patients  Associated Symptoms: none       Additional History (Context): Pamela Hendricks is a 23 y.o. female with a history of morbid obesity, asthma and an appendectomy who presents today for history as listed above. Patient reports that multiple family members at home have recently tested positive for Covid. Patient reports she has been trying to wait it out at home however today reports worsening shortness of breath and wheezing. Reports she called her primary care doctor and requested an albuterol prescription however was advised to come to the emergency department for evaluation instead. Patient is not vaccinated for Covid.       PCP: Jeana, MD Lamin    Current Facility-Administered Medications   Medication Dose Route Frequency Provider Last Rate Last Admin    sodium chloride (NS) flush 5-10 mL  5-10 mL IntraVENous PRN Lea Kenny PA        cefTRIAXone (ROCEPHIN) 2 g in sterile water (preservative free) 20 mL IV syringe  2 g IntraVENous Q24H Lea Kenny PA   2 g at 01/30/22 1644    azithromycin (ZITHROMAX) 500 mg in 0.9% sodium chloride 250 mL (VIAL-MATE)  500 mg IntraVENous Q24H MAXIMILIAN Maya 250 mL/hr at 01/30/22 1650 500 mg at 01/30/22 1650    insulin lispro (HUMALOG) injection   SubCUTAneous AC&HS Dandy Yuan MD        glucose chewable tablet 16 g  16 g Oral PRN Dandy Yuan MD        glucagon [facility]) injection 1 mg  1 mg IntraMUSCular PRN George Nieves MD        dextrose 10% infusion 125-250 mL  125-250 mL IntraVENous PRN George Nieves MD        [START ON 1/31/2022] dexamethasone (PF) (DECADRON) 10 mg/mL injection 10 mg  10 mg IntraVENous Q12H Moisés Trammell MD        ondansetron [facility]) injection 4 mg  4 mg IntraVENous Q4H PRN George Nieves MD        Beth Dave ON 1/31/2022] DULoxetine (CYMBALTA) capsule 30 mg  30 mg Oral DAILY George Nieves MD        gabapentin (NEURONTIN) capsule 100 mg  100 mg Oral TID George Nieves MD        albuterol-ipratropium (DUO-NEB) 2.5 MG-0.5 MG/3 ML  3 mL Nebulization Q4H RT Zhao Worley MD        budesonide (PULMICORT) 250 mcg/2ml nebulizer susp  500 mcg Nebulization BID RT George Nieves MD        arformoteroL (BROVANA) neb solution 15 mcg  15 mcg Nebulization BID RT George Nieves MD        omega-3 acid ethyl esters (LOVAZA) capsule 1,000 mg  1 g Oral BID WITH MEALS Zhao Worley MD        enoxaparin (LOVENOX) injection 40 mg  40 mg SubCUTAneous Q24H George Nieves MD         Current Outpatient Medications   Medication Sig Dispense Refill    acetaminophen (TYLENOL) 325 mg tablet Take 2 Tabs by mouth every four (4) hours as needed for Pain. (Patient not taking: Reported on 6/2/2021) 20 Tab 0    LORazepam (ATIVAN) 1 mg tablet Take 0.5 Tabs by mouth every eight (8) hours as needed for Anxiety. Max Daily Amount: 1.5 mg. (Patient not taking: Reported on 6/2/2021) 30 Tab 0    ibuprofen (MOTRIN) 800 mg tablet Take 1 Tab by mouth every eight (8) hours as needed for Pain. 30 Tab 1       Past History     Past Medical History:  No past medical history on file. Past Surgical History:  Past Surgical History:   Procedure Laterality Date    HX APPENDECTOMY         Family History:  No family history on file.     Social History:  Social History     Tobacco Use    Smoking status: Current Every Day Smoker    Smokeless tobacco: Never Used Substance Use Topics    Alcohol use: Not Currently    Drug use: Not Currently       Allergies:  No Known Allergies      Review of Systems   Review of Systems   Constitutional: Positive for appetite change, chills, fatigue and fever. HENT: Negative for congestion, rhinorrhea and sore throat. Respiratory: Positive for cough, shortness of breath and wheezing. Cardiovascular: Negative for chest pain. Gastrointestinal: Positive for diarrhea, nausea and vomiting. Negative for abdominal pain, blood in stool and constipation. Genitourinary: Negative for dysuria, frequency and hematuria. Musculoskeletal: Positive for myalgias. Negative for back pain. Skin: Negative for rash and wound. Neurological: Negative for dizziness and headaches. All other systems reviewed and are negative. All Other Systems Negative  Physical Exam     Vitals:    01/30/22 1239 01/30/22 1320 01/30/22 1330 01/30/22 1407   BP:       Pulse:  (!) 101 (!) 102 96   Resp:  14 29 18   Temp:       SpO2: 94% 96% 97% 95%   Weight:       Height:         Physical Exam  Vitals and nursing note reviewed. Constitutional:       General: She is not in acute distress. Appearance: She is well-developed. She is not diaphoretic. HENT:      Head: Normocephalic and atraumatic. Eyes:      Conjunctiva/sclera: Conjunctivae normal.   Cardiovascular:      Rate and Rhythm: Regular rhythm. Tachycardia present. Heart sounds: Normal heart sounds. No systolic murmur is present. No diastolic murmur is present. Pulmonary:      Effort: Pulmonary effort is normal. Tachypnea present. No accessory muscle usage or respiratory distress. Breath sounds: Decreased breath sounds and wheezing present. Chest:      Chest wall: No tenderness. Abdominal:      General: Bowel sounds are normal. There is no distension. Palpations: Abdomen is soft. Tenderness: There is no abdominal tenderness. There is no guarding or rebound. Musculoskeletal:         General: No deformity. Cervical back: Normal range of motion and neck supple. Skin:     General: Skin is warm and dry. Neurological:      Mental Status: She is alert and oriented to person, place, and time. Deep Tendon Reflexes: Reflexes are normal and symmetric. Diagnostic Study Results     Labs -     Recent Results (from the past 12 hour(s))   CBC WITH AUTOMATED DIFF    Collection Time: 01/30/22  1:18 PM   Result Value Ref Range    WBC 5.1 4.6 - 13.2 K/uL    RBC 4.82 4.20 - 5.30 M/uL    HGB 13.5 12.0 - 16.0 g/dL    HCT 43.7 35.0 - 45.0 %    MCV 90.7 78.0 - 100.0 FL    MCH 28.0 24.0 - 34.0 PG    MCHC 30.9 (L) 31.0 - 37.0 g/dL    RDW 14.3 11.6 - 14.5 %    PLATELET 716 504 - 015 K/uL    MPV 9.6 9.2 - 11.8 FL    NRBC 0.0 0  WBC    ABSOLUTE NRBC 0.00 0.00 - 0.01 K/uL    NEUTROPHILS 71 40 - 73 %    LYMPHOCYTES 24 21 - 52 %    MONOCYTES 4 3 - 10 %    EOSINOPHILS 0 0 - 5 %    BASOPHILS 0 0 - 2 %    IMMATURE GRANULOCYTES 1 (H) 0.0 - 0.5 %    ABS. NEUTROPHILS 3.6 1.8 - 8.0 K/UL    ABS. LYMPHOCYTES 1.2 0.9 - 3.6 K/UL    ABS. MONOCYTES 0.2 0.05 - 1.2 K/UL    ABS. EOSINOPHILS 0.0 0.0 - 0.4 K/UL    ABS. BASOPHILS 0.0 0.0 - 0.1 K/UL    ABS. IMM.  GRANS. 0.0 0.00 - 0.04 K/UL    DF AUTOMATED     HCG QL SERUM    Collection Time: 01/30/22  1:18 PM   Result Value Ref Range    HCG, Ql. Negative NEG     COVID-19 RAPID TEST    Collection Time: 01/30/22  1:20 PM   Result Value Ref Range    Specimen source Nasopharyngeal      COVID-19 rapid test Not detected NOTD     PROTHROMBIN TIME + INR    Collection Time: 01/30/22  4:00 PM   Result Value Ref Range    Prothrombin time 14.1 11.5 - 15.2 sec    INR 1.1 0.8 - 1.2     PTT    Collection Time: 01/30/22  4:00 PM   Result Value Ref Range    aPTT 37.3 (H) 23.0 - 36.4 SEC   POC LACTIC ACID    Collection Time: 01/30/22  4:20 PM   Result Value Ref Range    Lactic Acid (POC) 1.37 0.40 - 2.00 mmol/L   GLUCOSE, POC    Collection Time: 01/30/22 4:43 PM   Result Value Ref Range    Glucose (POC) 299 (H) 70 - 110 mg/dL       Radiologic Studies -   XR CHEST PORT   Final Result   1. Multifocal patchy airspace opacities involving the left greater than right   lung bases as well as the left midlung zone. Background of diffuse bilateral   interstitial opacities. Findings are most suspicious for multifocal pneumonia,   possible Covid 19 pneumonia; although other infectious or inflammatory process   is possible. Recommend radiographic follow-up within 6-8 weeks after appropriate   medical therapy and imaging follow-up until clearance. 2.  Possible left pleural effusion. CTA CHEST W OR W WO CONT    (Results Pending)     CT Results  (Last 48 hours)    None        CXR Results  (Last 48 hours)               01/30/22 1254  XR CHEST PORT Final result    Impression:  1. Multifocal patchy airspace opacities involving the left greater than right   lung bases as well as the left midlung zone. Background of diffuse bilateral   interstitial opacities. Findings are most suspicious for multifocal pneumonia,   possible Covid 19 pneumonia; although other infectious or inflammatory process   is possible. Recommend radiographic follow-up within 6-8 weeks after appropriate   medical therapy and imaging follow-up until clearance. 2.  Possible left pleural effusion. Narrative:  EXAM: XR CHEST PORT       INDICATION: 26 years Female. cough. ADDITIONAL HISTORY: None. TECHNIQUE: Frontal view of the chest.       COMPARISON: 7/19/2021       FINDINGS:       Underpenetration of the lung bases. Obscuration of the left heart border and   left hemidiaphragm. The cardiac silhouette appears similar to prior. Dense opacity is at the left   lung base, blunting of the left costophrenic sulcus. Hazy opacity at the right   lung base. Diffuse bilateral interstitial opacities within the left greater than   right lungs.  Additional airspace opacity at the peripheral aspect of the left   mid/upper lung zone. The right costophrenic sulcus is sharp. No definite pneumothorax. No acute osseous abnormality appreciated. Medical Decision Making   I am the first provider for this patient. I reviewed the vital signs, available nursing notes, past medical history, past surgical history, family history and social history. Vital Signs-Reviewed the patient's vital signs. Records Reviewed: Nursing Notes and Old Medical Records     Procedures: None   Procedures    Provider Notes (Medical Decision Making):     Differential Diagnosis:  influenza, mononucleosis, acute bronchitis, URI, streptococcal pharyngitis, pneumonia, asthma exacerbation, allergic rhinitis, seasonal allergies, COVID    Plan: Patient was 89% on room air upon arrival with bilateral expiratory wheezing. Patient was placed on 2 L in triage and O2 saturation improved to 97%. Will order duo nebs, albuterol, Decadron, Covid screen, labs and will place on cardiac monitor. Given recent exposure to Covid have discussed high likelihood of patient being Covid positive as well. Patient reports some nausea, will order Zofran. Will order pain medicine. ED Course as of 01/30/22 1707   Sun Jan 30, 2022   1358 Patient's lung sounds are now clear and equal bilaterally. Patient's heart rate and respiration rate has greatly improved. [CS]   8725 When patient is sleeping her sats continue to decrease, have increased her O2 to 4 L, she is resting peacefully at 92% [CS]      ED Course User Index  [CS] MAXIMILIAN Serra     2:17 PM  Patient is still satting 82% to   86% while walking on room air. Patient is agreeable to admission. Have discussed case with Dr. Greta Nuñez who agrees with need for admission. Will order remaining initial sepsis bundle and antibiotics. Will order Covid inflammatory markers.   Will consult hospitalist.  Initial Covid screening was negative however very high suspicion for COVID-19 given patient has multiple family members in her home with Covid. Order respiratory panel. Have discussed need for patient to be roomed on the main side of the ED with ANGELICA Sorenson.      2:25 PM  Discussed case with Dr. Derrick Del Valle. Pending remaining labs at this time. Will place admission orders. MED RECONCILIATION:  Current Facility-Administered Medications   Medication Dose Route Frequency    sodium chloride (NS) flush 5-10 mL  5-10 mL IntraVENous PRN    cefTRIAXone (ROCEPHIN) 2 g in sterile water (preservative free) 20 mL IV syringe  2 g IntraVENous Q24H    azithromycin (ZITHROMAX) 500 mg in 0.9% sodium chloride 250 mL (VIAL-MATE)  500 mg IntraVENous Q24H    insulin lispro (HUMALOG) injection   SubCUTAneous AC&HS    glucose chewable tablet 16 g  16 g Oral PRN    glucagon (GLUCAGEN) injection 1 mg  1 mg IntraMUSCular PRN    dextrose 10% infusion 125-250 mL  125-250 mL IntraVENous PRN    [START ON 1/31/2022] dexamethasone (PF) (DECADRON) 10 mg/mL injection 10 mg  10 mg IntraVENous Q12H    ondansetron (ZOFRAN) injection 4 mg  4 mg IntraVENous Q4H PRN    [START ON 1/31/2022] DULoxetine (CYMBALTA) capsule 30 mg  30 mg Oral DAILY    gabapentin (NEURONTIN) capsule 100 mg  100 mg Oral TID    albuterol-ipratropium (DUO-NEB) 2.5 MG-0.5 MG/3 ML  3 mL Nebulization Q4H RT    budesonide (PULMICORT) 250 mcg/2ml nebulizer susp  500 mcg Nebulization BID RT    arformoteroL (BROVANA) neb solution 15 mcg  15 mcg Nebulization BID RT    omega-3 acid ethyl esters (LOVAZA) capsule 1,000 mg  1 g Oral BID WITH MEALS    enoxaparin (LOVENOX) injection 40 mg  40 mg SubCUTAneous Q24H     Current Outpatient Medications   Medication Sig    acetaminophen (TYLENOL) 325 mg tablet Take 2 Tabs by mouth every four (4) hours as needed for Pain. (Patient not taking: Reported on 6/2/2021)    LORazepam (ATIVAN) 1 mg tablet Take 0.5 Tabs by mouth every eight (8) hours as needed for Anxiety.  Max Daily Amount: 1.5 mg. (Patient not taking: Reported on 6/2/2021)    ibuprofen (MOTRIN) 800 mg tablet Take 1 Tab by mouth every eight (8) hours as needed for Pain. Disposition:  Admit     Diagnosis     Clinical Impression:   1. Acute respiratory failure with hypoxia ([address]          \"Please note that this dictation was completed with [employer], the computer voice recognition software. Quite often unanticipated grammatical, syntax, homophones, and other interpretive errors are inadvertently transcribed by the computer software. Please disregard these errors. Please excuse any errors that have escaped final proofreading. \"

## 2022-01-30 NOTE — REMOTE MONITORING
Attempted to contact primary RN regarding this patient. Left message with Bedside RN regarding 3 hour Sepsis bundle.     [address]  Callback # [phone]

## 2022-01-30 NOTE — Clinical Note
Status[de-identified] INPATIENT [101]   Type of Bed: Telemetry [19]   Cardiac Monitoring Required?: Yes   Inpatient Hospitalization Certified Necessary for the Following Reasons: 3.  Patient receiving treatment that can only be provided in an inpatient setting (further clarification in H&P documentation)   Admitting Diagnosis: Acute respiratory failure with hypoxia [facility]) [[id number]]   Admitting Physician: Weston Sorto   Attending Physician: Weston Sorto   Estimated Length of Stay: 3-4 Midnights   Discharge Plan[de-identified] Home with Office Follow-up

## 2022-01-30 NOTE — CONSULTS
Consult received. Discussed with Dr. Nilson Randolph presentation suggestive of acute hypoxic respiratory failure-present on admission likely due to severe COVID-19 pneumonia, probable bacterial superinfection  Negative rapid Covid test.  However radiological findings and history of exposure to family members with COVID-19 is very suspicious for COVID-19 pneumonia  Recommendation  -Continue ceftriaxone, azithromycin for now  -Recommend Decadron 10 mg IV every 12 hours  -Obtain inflammatory markers  -Agree with respiratory viral panel PCR with COVID-19.   Maintain droplet plus isolation for now  -Add baricitinib if COVID-19 test positive  Full dictation to follow

## 2022-01-30 NOTE — H&P
History and Physical    Chief complaint: Worsening shortness of breath for last few days with dry cough. Also hemoptysis. HPI:     Felicita Manning is a 23 y.o.  female who presented to the emergency room with complaint of worsening shortness of breath, cough and hemoptysis. Patient stated she stays with her family members and had multiple family members positive for COVID-19 in recent past.  No obvious fever but subjectively she feels feverish. Denies any headaches or dizziness currently. No syncope. No soreness of throat. No problems swallowing. Denies any tingling or numbness. No chest pain or abdominal pain. No nausea or vomiting. No bowel or bladder disturbances. She did not get vaccinated. She takes Cymbalta and gabapentin at home. Denies smoking cigarettes or drinking any alcohol. She told me that she has been active with [facility] practice as an outpatient and spoke to her primary care physician answering service this morning who advised her to come to emergency room. In the emergency room she was found out to have saturation around 88 percentage on room air and 94% on 3 L. Her rapid COVID-19 test was negative but waiting for PCR test to be done. Chest x-ray shows bilateral diffuse interstitial opacity consistent with COVID-19 pneumonia. No past medical history on file. Past Surgical History:   Procedure Laterality Date    HX APPENDECTOMY       No family history on file. Social History     Tobacco Use    Smoking status: Current Every Day Smoker    Smokeless tobacco: Never Used   Substance Use Topics    Alcohol use: Not Currently       Prior to Admission medications    Medication Sig Start Date End Date Taking? Authorizing Provider   acetaminophen (TYLENOL) 325 mg tablet Take 2 Tabs by mouth every four (4) hours as needed for Pain.   Patient not taking: Reported on 6/2/2021 3/16/21   MAXIMILIAN Johnson   LORazepam (ATIVAN) 1 mg tablet Take 0.5 Tabs by mouth every eight (8) hours as needed for Anxiety. Max Daily Amount: 1.5 mg. Patient not taking: Reported on 6/2/2021 2/15/21   Angie Malone MD   ibuprofen (MOTRIN) 800 mg tablet Take 1 Tab by mouth every eight (8) hours as needed for Pain. 2/5/21   Angie Malone MD     No Known Allergies     Review of Systems:  Pertinent items are noted in the History of Present Illness. Objective: Intake and Output:    No intake/output data recorded. No intake/output data recorded. Physical Exam:     BP (!) 143/82   Pulse 96   Temp 99.2 °F (37.3 °C)   Resp 18   Ht 5' 4\" (1.626 m)   Wt (!) 199.6 kg (440 lb)   SpO2 95%   BMI 75.53 kg/m²       General appearance - alert, well appearing, and in no distress  Eyes - sclera anicteric, no pallor  Nose - no obvious nasal discharge. Neck - supple, no JVD, trachea is midline  Chest -diminished air entry noted in bases, no wheezes currently, rhonchi present  Heart - S1 and S2 normal  Abdomen - soft, nontender, nondistended, Bowel sounds present  Neurological - alert, oriented, normal speech, no focal findings noted, sensation touch normal in all 4 extremities, no tremors noted. Musculoskeletal - no joint tenderness or erythema of knees bilaterally  Extremities - no pedal edema noted      Data Review:   Recent Results (from the past 24 hour(s))   CBC WITH AUTOMATED DIFF    Collection Time: 01/30/22  1:18 PM   Result Value Ref Range    WBC 5.1 4.6 - 13.2 K/uL    RBC 4.82 4.20 - 5.30 M/uL    HGB 13.5 12.0 - 16.0 g/dL    HCT 43.7 35.0 - 45.0 %    MCV 90.7 78.0 - 100.0 FL    MCH 28.0 24.0 - 34.0 PG    MCHC 30.9 (L) 31.0 - 37.0 g/dL    RDW 14.3 11.6 - 14.5 %    PLATELET 977 173 - 876 K/uL    MPV 9.6 9.2 - 11.8 FL    NRBC 0.0 0  WBC    ABSOLUTE NRBC 0.00 0.00 - 0.01 K/uL    NEUTROPHILS 71 40 - 73 %    LYMPHOCYTES 24 21 - 52 %    MONOCYTES 4 3 - 10 %    EOSINOPHILS 0 0 - 5 %    BASOPHILS 0 0 - 2 %    IMMATURE GRANULOCYTES 1 (H) 0.0 - 0.5 %    ABS.  NEUTROPHILS 3.6 1.8 - 8.0 K/UL ABS. LYMPHOCYTES 1.2 0.9 - 3.6 K/UL    ABS. MONOCYTES 0.2 0.05 - 1.2 K/UL    ABS. EOSINOPHILS 0.0 0.0 - 0.4 K/UL    ABS. BASOPHILS 0.0 0.0 - 0.1 K/UL    ABS. IMM. GRANS. 0.0 0.00 - 0.04 K/UL    DF AUTOMATED     HCG QL SERUM    Collection Time: 01/30/22  1:18 PM   Result Value Ref Range    HCG, Ql. Negative NEG     COVID-19 RAPID TEST    Collection Time: 01/30/22  1:20 PM   Result Value Ref Range    Specimen source Nasopharyngeal      COVID-19 rapid test Not detected NOTD     PROTHROMBIN TIME + INR    Collection Time: 01/30/22  4:00 PM   Result Value Ref Range    Prothrombin time 14.1 11.5 - 15.2 sec    INR 1.1 0.8 - 1.2     PTT    Collection Time: 01/30/22  4:00 PM   Result Value Ref Range    aPTT 37.3 (H) 23.0 - 36.4 SEC   POC LACTIC ACID    Collection Time: 01/30/22  4:20 PM   Result Value Ref Range    Lactic Acid (POC) 1.37 0.40 - 2.00 mmol/L   GLUCOSE, POC    Collection Time: 01/30/22  4:43 PM   Result Value Ref Range    Glucose (POC) 299 (H) 70 - 110 mg/dL       CXR Results  (Last 48 hours)               01/30/22 1254  XR CHEST PORT Final result    Impression:  1. Multifocal patchy airspace opacities involving the left greater than right   lung bases as well as the left midlung zone. Background of diffuse bilateral   interstitial opacities. Findings are most suspicious for multifocal pneumonia,   possible Covid 19 pneumonia; although other infectious or inflammatory process   is possible. Recommend radiographic follow-up within 6-8 weeks after appropriate   medical therapy and imaging follow-up until clearance. 2.  Possible left pleural effusion. Narrative:  EXAM: XR CHEST PORT       INDICATION: 26 years Female. cough. ADDITIONAL HISTORY: None. TECHNIQUE: Frontal view of the chest.       COMPARISON: 7/19/2021       FINDINGS:       Underpenetration of the lung bases. Obscuration of the left heart border and   left hemidiaphragm. The cardiac silhouette appears similar to prior.  Dense opacity is at the left   lung base, blunting of the left costophrenic sulcus. Hazy opacity at the right   lung base. Diffuse bilateral interstitial opacities within the left greater than   right lungs. Additional airspace opacity at the peripheral aspect of the left   mid/upper lung zone. The right costophrenic sulcus is sharp. No definite pneumothorax. No acute osseous abnormality appreciated. Assessment:     Active Problems:    Acute respiratory failure with hypoxia ([address] (1/30/2022)      Hypoxia (1/30/2022)      Pneumonia due to COVID-19 virus (1/30/2022)    1. Shortness of breath and hypoxia due to #2 and #3  2. Bilateral pneumonia could be due to COVID-19  3. Acute on chronic mild intermittent asthma with exacerbation  4. History of neuropathy  5. Hyperglycemia  6. Elevated blood pressure due to steroid  7. Obesity    Plan:     Patient will be admitted to hospital.  Chest x-ray report reviewed and we will obtain CT chest/CTA chest.  Pending inflammatory biomarkers. We will continue IV steroid. Continue antibiotic at this point. ID has been consulted. We will continue Cymbalta and gabapentin. Patient will be placed on sliding scale for diabetes/hyperglycemia management. Patient will be placed on hydralazine as needed. Continue DuoNeb's at this point as well as we will add Brovana and Pulmicort. Due to patient's having mild hemoptysis, pulmonary will be consulted once CTA chest is obtained. Echocardiogram has been ordered. Patient wishes to be full code. Total time to take care of this patient was 65 minutes and more than 50% of time was spent counseling and coordinating care. Disclaimer: Sections of this note are dictated using utilizing voice recognition software, which may have resulted in some phonetic based errors in grammar and contents.  Even though attempts were made to correct all the mistakes, some may have been missed, and remained in the body of the document. If questions arise, please contact our department.

## 2022-01-31 LAB
ALBUMIN SERPL-MCNC: 3 G/DL (ref 3.4–5)
ALBUMIN/GLOB SERPL: 0.7 {RATIO} (ref 0.8–1.7)
ALP SERPL-CCNC: 63 U/L (ref 45–117)
ALT SERPL-CCNC: 109 U/L (ref 13–56)
ANION GAP SERPL CALC-SCNC: 8 MMOL/L (ref 3–18)
AST SERPL-CCNC: 88 U/L (ref 10–38)
ATRIAL RATE: 101 BPM
BASOPHILS # BLD: 0 K/UL (ref 0–0.1)
BASOPHILS NFR BLD: 0 % (ref 0–2)
BILIRUB SERPL-MCNC: 1 MG/DL (ref 0.2–1)
BUN SERPL-MCNC: 13 MG/DL (ref 7–18)
BUN/CREAT SERPL: 16 (ref 12–20)
CALCIUM SERPL-MCNC: 8 MG/DL (ref 8.5–10.1)
CALCULATED P AXIS, ECG09: 59 DEGREES
CALCULATED R AXIS, ECG10: 62 DEGREES
CALCULATED T AXIS, ECG11: 43 DEGREES
CHLORIDE SERPL-SCNC: 98 MMOL/L (ref 100–111)
CO2 SERPL-SCNC: 28 MMOL/L (ref 21–32)
CREAT SERPL-MCNC: 0.83 MG/DL (ref 0.6–1.3)
CRP SERPL-MCNC: 10.3 MG/DL (ref 0–0.3)
D DIMER PPP FEU-MCNC: 0.86 UG/ML(FEU)
DIAGNOSIS, 93000: NORMAL
DIFFERENTIAL METHOD BLD: ABNORMAL
EOSINOPHIL # BLD: 0 K/UL (ref 0–0.4)
EOSINOPHIL NFR BLD: 0 % (ref 0–5)
ERYTHROCYTE [DISTWIDTH] IN BLOOD BY AUTOMATED COUNT: 14.2 % (ref 11.6–14.5)
EST. AVERAGE GLUCOSE BLD GHB EST-MCNC: 298 MG/DL
GLOBULIN SER CALC-MCNC: 4.1 G/DL (ref 2–4)
GLUCOSE BLD STRIP.AUTO-MCNC: 307 MG/DL (ref 70–110)
GLUCOSE BLD STRIP.AUTO-MCNC: 329 MG/DL (ref 70–110)
GLUCOSE BLD STRIP.AUTO-MCNC: 334 MG/DL (ref 70–110)
GLUCOSE BLD STRIP.AUTO-MCNC: 344 MG/DL (ref 70–110)
GLUCOSE SERPL-MCNC: 375 MG/DL (ref 74–99)
HBA1C MFR BLD: 12 % (ref 4.2–5.6)
HCT VFR BLD AUTO: 42.6 % (ref 35–45)
HGB BLD-MCNC: 13.1 G/DL (ref 12–16)
IMM GRANULOCYTES # BLD AUTO: 0.1 K/UL (ref 0–0.04)
IMM GRANULOCYTES NFR BLD AUTO: 1 % (ref 0–0.5)
LYMPHOCYTES # BLD: 0.9 K/UL (ref 0.9–3.6)
LYMPHOCYTES NFR BLD: 15 % (ref 21–52)
MAGNESIUM SERPL-MCNC: 2.1 MG/DL (ref 1.6–2.6)
MCH RBC QN AUTO: 27.8 PG (ref 24–34)
MCHC RBC AUTO-ENTMCNC: 30.8 G/DL (ref 31–37)
MCV RBC AUTO: 90.4 FL (ref 78–100)
MONOCYTES # BLD: 0.2 K/UL (ref 0.05–1.2)
MONOCYTES NFR BLD: 4 % (ref 3–10)
NEUTS SEG # BLD: 4.6 K/UL (ref 1.8–8)
NEUTS SEG NFR BLD: 80 % (ref 40–73)
NRBC # BLD: 0 K/UL (ref 0–0.01)
NRBC BLD-RTO: 0 PER 100 WBC
P-R INTERVAL, ECG05: 146 MS
PLATELET # BLD AUTO: 222 K/UL (ref 135–420)
PMV BLD AUTO: 9.9 FL (ref 9.2–11.8)
POTASSIUM SERPL-SCNC: 3.8 MMOL/L (ref 3.5–5.5)
PROCALCITONIN SERPL-MCNC: <0.05 NG/ML
PROT SERPL-MCNC: 7.1 G/DL (ref 6.4–8.2)
Q-T INTERVAL, ECG07: 356 MS
QRS DURATION, ECG06: 96 MS
QTC CALCULATION (BEZET), ECG08: 461 MS
RBC # BLD AUTO: 4.71 M/UL (ref 4.2–5.3)
SODIUM SERPL-SCNC: 134 MMOL/L (ref 136–145)
VENTRICULAR RATE, ECG03: 101 BPM
WBC # BLD AUTO: 5.8 K/UL (ref 4.6–13.2)

## 2022-01-31 PROCEDURE — 74011636637 HC RX REV CODE- 636/637: Performed by: HOSPITALIST

## 2022-01-31 PROCEDURE — 85025 COMPLETE CBC W/AUTO DIFF WBC: CPT

## 2022-01-31 PROCEDURE — 86140 C-REACTIVE PROTEIN: CPT

## 2022-01-31 PROCEDURE — 74011636637 HC RX REV CODE- 636/637: Performed by: INTERNAL MEDICINE

## 2022-01-31 PROCEDURE — 74011250636 HC RX REV CODE- 250/636: Performed by: INTERNAL MEDICINE

## 2022-01-31 PROCEDURE — 83735 ASSAY OF MAGNESIUM: CPT

## 2022-01-31 PROCEDURE — 74011000250 HC RX REV CODE- 250: Performed by: INTERNAL MEDICINE

## 2022-01-31 PROCEDURE — 74011250636 HC RX REV CODE- 250/636: Performed by: PHYSICIAN ASSISTANT

## 2022-01-31 PROCEDURE — 82962 GLUCOSE BLOOD TEST: CPT

## 2022-01-31 PROCEDURE — 85379 FIBRIN DEGRADATION QUANT: CPT

## 2022-01-31 PROCEDURE — 84145 PROCALCITONIN (PCT): CPT

## 2022-01-31 PROCEDURE — 80053 COMPREHEN METABOLIC PANEL: CPT

## 2022-01-31 PROCEDURE — 36415 COLL VENOUS BLD VENIPUNCTURE: CPT

## 2022-01-31 PROCEDURE — 74011000258 HC RX REV CODE- 258: Performed by: INTERNAL MEDICINE

## 2022-01-31 PROCEDURE — 74011250637 HC RX REV CODE- 250/637: Performed by: INTERNAL MEDICINE

## 2022-01-31 PROCEDURE — 83036 HEMOGLOBIN GLYCOSYLATED A1C: CPT

## 2022-01-31 PROCEDURE — 99232 SBSQ HOSP IP/OBS MODERATE 35: CPT | Performed by: HOSPITALIST

## 2022-01-31 PROCEDURE — 65660000000 HC RM CCU STEPDOWN

## 2022-01-31 RX ORDER — BUDESONIDE AND FORMOTEROL FUMARATE DIHYDRATE 160; 4.5 UG/1; UG/1
2 AEROSOL RESPIRATORY (INHALATION)
Status: DISCONTINUED | OUTPATIENT
Start: 2022-01-31 | End: 2022-02-08 | Stop reason: HOSPADM

## 2022-01-31 RX ORDER — INSULIN GLARGINE 100 [IU]/ML
10 INJECTION, SOLUTION SUBCUTANEOUS DAILY
Status: DISCONTINUED | OUTPATIENT
Start: 2022-01-31 | End: 2022-02-01

## 2022-01-31 RX ADMIN — ONDANSETRON 4 MG: 2 INJECTION INTRAMUSCULAR; INTRAVENOUS at 16:06

## 2022-01-31 RX ADMIN — Medication 10 UNITS: at 12:57

## 2022-01-31 RX ADMIN — TOCILIZUMAB 810 MG: 180 INJECTION, SOLUTION SUBCUTANEOUS at 18:15

## 2022-01-31 RX ADMIN — IPRATROPIUM BROMIDE AND ALBUTEROL SULFATE 3 ML: .5; 3 SOLUTION RESPIRATORY (INHALATION) at 01:50

## 2022-01-31 RX ADMIN — Medication 12 UNITS: at 18:18

## 2022-01-31 RX ADMIN — GABAPENTIN 100 MG: 100 CAPSULE ORAL at 23:29

## 2022-01-31 RX ADMIN — Medication 12 UNITS: at 23:34

## 2022-01-31 RX ADMIN — OMEGA-3-ACID ETHYL ESTERS 1000 MG: 1 CAPSULE, LIQUID FILLED ORAL at 08:46

## 2022-01-31 RX ADMIN — ACETAMINOPHEN 500 MG: 500 TABLET ORAL at 02:09

## 2022-01-31 RX ADMIN — OMEGA-3-ACID ETHYL ESTERS 1000 MG: 1 CAPSULE, LIQUID FILLED ORAL at 18:15

## 2022-01-31 RX ADMIN — IPRATROPIUM BROMIDE AND ALBUTEROL 1 PUFF: 20; 100 SPRAY, METERED RESPIRATORY (INHALATION) at 23:37

## 2022-01-31 RX ADMIN — DULOXETINE 30 MG: 30 CAPSULE, DELAYED RELEASE ORAL at 08:46

## 2022-01-31 RX ADMIN — AZITHROMYCIN MONOHYDRATE 500 MG: 500 INJECTION, POWDER, LYOPHILIZED, FOR SOLUTION INTRAVENOUS at 14:07

## 2022-01-31 RX ADMIN — BARICITINIB 4 MG: 2 TABLET, FILM COATED ORAL at 12:46

## 2022-01-31 RX ADMIN — DEXAMETHASONE SODIUM PHOSPHATE 10 MG: 4 INJECTION, SOLUTION INTRAMUSCULAR; INTRAVENOUS at 12:46

## 2022-01-31 RX ADMIN — IPRATROPIUM BROMIDE AND ALBUTEROL 1 PUFF: 20; 100 SPRAY, METERED RESPIRATORY (INHALATION) at 20:00

## 2022-01-31 RX ADMIN — DEXAMETHASONE SODIUM PHOSPHATE 10 MG: 4 INJECTION, SOLUTION INTRAMUSCULAR; INTRAVENOUS at 23:30

## 2022-01-31 RX ADMIN — DEXAMETHASONE SODIUM PHOSPHATE 10 MG: 4 INJECTION, SOLUTION INTRAMUSCULAR; INTRAVENOUS at 01:50

## 2022-01-31 RX ADMIN — GABAPENTIN 100 MG: 100 CAPSULE ORAL at 18:15

## 2022-01-31 RX ADMIN — Medication 8 UNITS: at 08:45

## 2022-01-31 RX ADMIN — GABAPENTIN 100 MG: 100 CAPSULE ORAL at 08:46

## 2022-01-31 RX ADMIN — ACETAMINOPHEN 500 MG: 500 TABLET ORAL at 16:06

## 2022-01-31 RX ADMIN — IPRATROPIUM BROMIDE AND ALBUTEROL 1 PUFF: 20; 100 SPRAY, METERED RESPIRATORY (INHALATION) at 12:00

## 2022-01-31 RX ADMIN — Medication 12 UNITS: at 12:57

## 2022-01-31 RX ADMIN — BUDESONIDE AND FORMOTEROL FUMARATE DIHYDRATE 2 PUFF: 160; 4.5 AEROSOL RESPIRATORY (INHALATION) at 08:48

## 2022-01-31 RX ADMIN — ACETAMINOPHEN 500 MG: 500 TABLET ORAL at 08:46

## 2022-01-31 RX ADMIN — BUDESONIDE AND FORMOTEROL FUMARATE DIHYDRATE 2 PUFF: 160; 4.5 AEROSOL RESPIRATORY (INHALATION) at 23:37

## 2022-01-31 RX ADMIN — IPRATROPIUM BROMIDE AND ALBUTEROL 1 PUFF: 20; 100 SPRAY, METERED RESPIRATORY (INHALATION) at 08:47

## 2022-01-31 RX ADMIN — ENOXAPARIN SODIUM 40 MG: 100 INJECTION SUBCUTANEOUS at 18:14

## 2022-01-31 NOTE — ED NOTES
Assumed care of patient. Patient ambulated back from bathroom, No complaints at this time. Patient placed back on cardiac monitor.

## 2022-01-31 NOTE — DIABETES MGMT
Diabetes/ Glycemic Control Plan of Care    Recommendations:   1.) modify correctional lispro insulin to very resistant dose per protocol. Done. 2.) add basal lantus insulin 10 units daily starting to today. Order obtained. Cont to monitor BG values. Cont to assess total daily dose of insulin and increase basal lantus insulin as needed if BG remain above target range. 1/31:  Patient seen in ED and noted that she was admitted on 1/30/2022 with report of shortness of breath, wheezing, diarrhea, decreased appetite and lost of taste/smell x 1 week. Informed and explained A1c of 12.0 (1/31/2022). Patient reported no history of diabetes. She's feeling short of breath at this time but agreed for follow-up visit another time for diabetes education. Assessment:   DX:   1. Acute respiratory failure with hypoxia (HCC)        Noted the following assessment:  COVID-19 detected  Bilateral pneumonia  Acute on chronic mild intermittent  Asthma with exacerbation  Obesity with BMI of 75.53 (weight: 199.6 kg)    Fasting/ Morning blood glucose:   Lab Results   Component Value Date/Time    Glucose 375 (H) 01/31/2022 03:13 AM    Glucose (POC) 334 (H) 01/31/2022 08:16 AM     IV Fluids containing dextrose:   None    Steroids:   Rx Glucocorticoids (24h ago, onward)             Start     Dose Route Frequency Ordered Stop    01/31/22 0000  dexamethasone (DECADRON) 4 mg/mL injection 10 mg         10 mg IV EVERY 12 HOURS 01/30/22 1516 --               Rx Glucocorticoids (24h ago, onward)             Start     Dose Route Frequency Ordered Stop    01/31/22 0000  dexamethasone (DECADRON) 4 mg/mL injection 10 mg         10 mg IV EVERY 12 HOURS 01/30/22 1516 --                 Blood glucose values: Within target range (70-180mg/dL): No    Current insulin orders:   Correctional lispro insulin.  Modified to very resistant dose per protocol    Total Daily Dose previous 24 hours: 16 units correctional lispro insulin    Current A1c: Lab Results   Component Value Date/Time    Hemoglobin A1c 12.0 (H) 01/31/2022 03:13 AM      equivalent  to ave Blood Glucose of 298 mg/dl for 2-3 months prior to admission    Adequate glycemic control PTA: N/A. Patient reported no history of diabetes prior to this admission. Nutrition/Diet:   Active Orders   Diet    ADULT DIET Regular; 4 carb choices (60 gm/meal)      Meal Intake:  No data found. Supplement Intake:  No data found. Home diabetes medications:  1/31/2022:  Patient reported no history of diabetes prior to this admission. Key Antihyperglycemic Medications     Patient is on no antihyperglycemic meds. Plan/Goals:   Blood glucose will be within target of 70 - 180 mg/dl within 72 hours  Reinforce dietary and medication compliance at home. Education: 1/31/2022:  Patient reported no history of diabetes. Informed and explained A1c of 12.0 (1/31/2022). Patient agreed for follow-up visit for diabetes education.    [] Refer to Diabetes Education Record   [] Education not indicated at this time     Marielle Proctor RN [facility]  Pager: [phone]

## 2022-01-31 NOTE — ED NOTES
Verbal shift change report given to Belia Cope (oncoming nurse) by Juvencio Kibry (offgoing nurse). Report included the following information SBAR, ED Summary and MAR.

## 2022-01-31 NOTE — CONSULTS
Infectious Disease Consultation Note        Reason: COVID-19 pneumonia, hypoxic respiratory failure    Current abx Prior abx   Ceftriaxone, azithromycin since 1/30/2022      Lines:       Assessment :    23 y.o.  female with h/o uncontrolled type 2 DM, obesity presented to the emergency room on 1/30/22 with complaint of worsening shortness of breath, cough and hemoptysis    Unvaccinated for COVID-19    Clinical presentation consistent with acute hypoxic respiratory failure-present on admission due to confirmed COVID-19 pneumonia    Status post high-dose Decadron since 1/30/2022    Persistent high oxygen requirement, increasing CRP, unvaccinated status, obesity, uncontrolled type 2 diabetes-high risk for clinical deterioration. Hence will escalate care    Recommendations:    1. Continue Decadron 10 mg IV every 12 hours. Discontinue baricitinib  2. Recommend Tocilizumab  3. Discontinue ceftriaxone. Discontinue azithromycin after tomorrow's dose  4. Monitor CRP, D-dimer, procalcitonin  5. Titrate oxygen as tolerated      Thank you for consultation request. Above plan was discussed in details with patient, RN, primary team. Please call me if any further questions or concerns. Will continue to participate in the care of this patient. HPI:    23 y.o.  female with h/o type 2 DM, obesity presented to the emergency room on 1/30/22 with complaint of worsening shortness of breath, cough and hemoptysis. Patient stated she stays with her family members and had multiple family members positive for COVID-19 in recent past.    She told me that she has been active with [facility] practice as an outpatient and spoke to her primary care physician answering service this morning who advised her to come to emergency room. In the emergency room she was found out to have saturation around 88 percentage on room air and 94% on 3 L. Her rapid COVID-19 test was negative but waiting for PCR test to be done.   Chest x-ray shows bilateral diffuse interstitial opacity consistent with COVID-19 pneumonia. No obvious fever but subjectively she feels feverish. Denies any headaches or dizziness currently. No syncope. No soreness of throat. No problems swallowing. Denies any tingling or numbness. No chest pain or abdominal pain. No nausea or vomiting. No bowel or bladder disturbances. She did not get vaccinated. She takes Cymbalta and gabapentin at home. Denies smoking cigarettes or drinking any alcohol. No past medical history on file. Past Surgical History:   Procedure Laterality Date    HX APPENDECTOMY         Patient's Medications   Start Taking    No medications on file   Continue Taking    ACETAMINOPHEN (TYLENOL) 325 MG TABLET    Take 2 Tabs by mouth every four (4) hours as needed for Pain. IBUPROFEN (MOTRIN) 800 MG TABLET    Take 1 Tab by mouth every eight (8) hours as needed for Pain. LORAZEPAM (ATIVAN) 1 MG TABLET    Take 0.5 Tabs by mouth every eight (8) hours as needed for Anxiety. Max Daily Amount: 1.5 mg. These Medications have changed    No medications on file   Stop Taking    CYCLOBENZAPRINE (FLEXERIL) 10 MG TABLET    Take 1 Tab by mouth three (3) times daily as needed for Muscle Spasm(s). DIAZEPAM (VALIUM) 5 MG TABLET    Take 1 Tab by mouth three (3) times daily as needed for Anxiety (spasm). Max Daily Amount: 15 mg. KETOROLAC (TORADOL) 10 MG TABLET    Take 1 Tab by mouth every six (6) hours as needed for Pain.        Current Facility-Administered Medications   Medication Dose Route Frequency    ipratropium-albuterol (COMBIVENT RESPIMAT) 20 mcg-100 mcg inhalation spray  1 Puff Inhalation Q4H RT    budesonide-formoteroL (SYMBICORT) 160-4.5 mcg/actuation HFA inhaler 2 Puff  2 Puff Inhalation BID RT    baricitinib (OLUMIANT) tablet 4 mg  4 mg Oral DAILY    insulin glargine (LANTUS) injection 10 Units  10 Units SubCUTAneous DAILY    sodium chloride (NS) flush 5-10 mL  5-10 mL IntraVENous PRN    cefTRIAXone (ROCEPHIN) 2 g in sterile water (preservative free) 20 mL IV syringe  2 g IntraVENous Q24H    azithromycin (ZITHROMAX) 500 mg in 0.9% sodium chloride 250 mL (VIAL-MATE)  500 mg IntraVENous Q24H    insulin lispro (HUMALOG) injection   SubCUTAneous AC&HS    glucose chewable tablet 16 g  16 g Oral PRN    glucagon (GLUCAGEN) injection 1 mg  1 mg IntraMUSCular PRN    dextrose 10% infusion 125-250 mL  125-250 mL IntraVENous PRN    dexamethasone (DECADRON) 4 mg/mL injection 10 mg  10 mg IntraVENous Q12H    acetaminophen (TYLENOL) tablet 500 mg  500 mg Oral Q6H PRN    ondansetron (ZOFRAN) injection 4 mg  4 mg IntraVENous Q4H PRN    DULoxetine (CYMBALTA) capsule 30 mg  30 mg Oral DAILY    gabapentin (NEURONTIN) capsule 100 mg  100 mg Oral TID    omega-3 acid ethyl esters (LOVAZA) capsule 1,000 mg  1 g Oral BID WITH MEALS    enoxaparin (LOVENOX) injection 40 mg  40 mg SubCUTAneous Q24H    hydrALAZINE (APRESOLINE) tablet 25 mg  25 mg Oral TID PRN     Current Outpatient Medications   Medication Sig    acetaminophen (TYLENOL) 325 mg tablet Take 2 Tabs by mouth every four (4) hours as needed for Pain. (Patient not taking: Reported on 6/2/2021)    LORazepam (ATIVAN) 1 mg tablet Take 0.5 Tabs by mouth every eight (8) hours as needed for Anxiety. Max Daily Amount: 1.5 mg. (Patient not taking: Reported on 6/2/2021)    ibuprofen (MOTRIN) 800 mg tablet Take 1 Tab by mouth every eight (8) hours as needed for Pain. Allergies: Patient has no known allergies. No family history on file.   Social History     Socioeconomic History    Marital status:      Spouse name: Not on file    Number of children: Not on file    Years of education: Not on file    Highest education level: Not on file   Occupational History    Not on file   Tobacco Use    Smoking status: Current Every Day Smoker    Smokeless tobacco: Never Used   Substance and Sexual Activity    Alcohol use: Not Currently    Drug use: Not Currently    Sexual activity: Not on file   Other Topics Concern    Not on file   Social History Narrative    Not on file     Social Determinants of Health     Financial Resource Strain:     Difficulty of Paying Living Expenses: Not on file   Food Insecurity:     Worried About Running Out of Food in the Last Year: Not on file    Tamar of Food in the Last Year: Not on file   Transportation Needs:     Lack of Transportation (Medical): Not on file    Lack of Transportation (Non-Medical): Not on file   Physical Activity:     Days of Exercise per Week: Not on file    Minutes of Exercise per Session: Not on file   Stress:     Feeling of Stress : Not on file   Social Connections:     Frequency of Communication with Friends and Family: Not on file    Frequency of Social Gatherings with Friends and Family: Not on file    Attends Shinto Services: Not on file    Active Member of [address] [place] [address] [employer] or Organizations: Not on file    Attends Club or Organization Meetings: Not on file    Marital Status: Not on file   Intimate Partner Violence:     Fear of Current or Ex-Partner: Not on file    Emotionally Abused: Not on file    Physically Abused: Not on file    Sexually Abused: Not on file   Housing Stability:     Unable to Pay for Housing in the Last Year: Not on file    Number of [place] in the Last Year: Not on file    Unstable Housing in the Last Year: Not on file     Social History     Tobacco Use   Smoking Status Current Every Day Smoker   Smokeless Tobacco Never Used        Temp (24hrs), Av.5 °F (36.9 °C), Min:98.4 °F (36.9 °C), Max:98.6 °F (37 °C)    Visit Vitals  BP (!) 153/88   Pulse 62   Temp 98.4 °F (36.9 °C)   Resp 21   Ht 5' 4\" (1.626 m)   Wt (!) 199.6 kg (440 lb)   SpO2 93%   BMI 75.53 kg/m²       ROS: 12 point ROS obtained in details. Pertinent positives as mentioned in HPI,   otherwise negative    Physical Exam:    Vitals signs and nursing note reviewed.    Constitutional: General: He is not in acute distress. Appearance: He is well-developed. HENT:      Head: Normocephalic. Eyes:      Conjunctiva/sclera: Conjunctivae normal.      Neck:      Musculoskeletal: Normal range of motion and neck supple. Cardiovascular:      Rate and Rhythm: Normal rate and regular rhythm on monitor  Chest:      Bilateral chest movements equal.  Auscultation deferred due to Covid positive  Abdominal:      General: There is no distension. Palpations: Abdomen is soft. Tenderness: There is no abdominal tenderness. There is no rebound. Musculoskeletal: Normal range of motion. General: No tenderness. Skin:     General: Skin is warm and dry. Findings: No rash. Neurological:      Mental Status:  alert and oriented to person, place, and time. Cranial Nerves: No cranial nerve deficit. Motor: No abnormal muscle tone. Coordination: Coordination normal.   Psychiatric:         Behavior: Behavior normal.         Thought Content: Thought content normal.         Judgment: Judgment normal.     Labs: Results:   Chemistry Recent Labs     01/31/22  0313 01/30/22  1709   * 304*   * 134*   K 3.8 3.8   CL 98* 98*   CO2 28 29   BUN 13 9   CREA 0.83 0.80   CA 8.0* 7.3*   AGAP 8 7   BUCR 16 11*   AP 63 55   TP 7.1 6.6   ALB 3.0* 2.6*   GLOB 4.1* 4.0   AGRAT 0.7* 0.7*      CBC w/Diff Recent Labs     01/31/22  0313 01/30/22  1318   WBC 5.8 5.1   RBC 4.71 4.82   HGB 13.1 13.5   HCT 42.6 43.7    185   GRANS 80* 71   LYMPH 15* 24   EOS 0 0      Microbiology Recent Labs     01/30/22  1615 01/30/22  1600   CULT NO GROWTH AFTER 14 HOURS NO GROWTH AFTER 14 HOURS          RADIOLOGY:    All available imaging studies/reports in [facility] for this admission were reviewed      Disclaimer: Sections of this note are dictated utilizing voice recognition software, which may have resulted in some phonetic based errors in grammar and contents.  Even though attempts were made to correct all the mistakes, some may have been missed, and remained in the body of the document. If questions arise, please contact our department.     Dr. Lyric Merritt, Infectious Disease Specialist  [phone]  January 31, 2022  3:16 PM

## 2022-01-31 NOTE — PROGRESS NOTES
PT orders received and chart reviewed. Droplet plus isolation. Per chart, patient is ambulating independently in ED. Independent prior to admission. No formal PT evaluation not indicated. Discontinuing PT orders.

## 2022-01-31 NOTE — PROGRESS NOTES
[facility] Hospitalist Group  Progress Note    Patient: Vee Viramontes Age: 23 y.o. : 1995 MR#: [id number] SSN: [id number]  Date/Time: 2022     Subjective:     Patient seen and evaluated, lying in bed, no acute distress. 60-year-old female admitted to the hospital secondary to acute hypoxic respiratory failure from COVID-19 pneumonia. Patient is currently on 10 L salter. ID and pulmonary on board        Assessment/Plan:     1. COVID-19 pneumonia-continue IV Decadron, recommend Tocilizumab, discontinue antibiotics after tomorrow's dose, monitor inflammatory markers. 2. Acute hypoxic respiratory failure secondary to COVID-19 pneumonia-continue supplemental oxygen on 10 L salter, pulmonary on board. 3. History of neuropathy-continue Neurontin and Cymbalta. 4. Super morbid obesity  DVT prophylaxis-Lovenox  Full code                Myrna Raza MD  23      Case discussed with:  []Patient  []Family  []Nursing  []Case Management  DVT Prophylaxis:  []Lovenox  []Hep SQ  []SCDs  []Coumadin   []On Heparin gtt    Objective:   VS:   Visit Vitals  /75   Pulse 89   Temp 97.8 °F (36.6 °C)   Resp 21   Ht 5' 4\" (1.626 m)   Wt (!) 199.6 kg (440 lb)   SpO2 92%   BMI 75.53 kg/m²      Tmax/24hrs: Temp (24hrs), Av.3 °F (36.8 °C), Min:97.8 °F (36.6 °C), Max:98.6 °F (37 °C)  IOBRIEFNo intake or output data in the 24 hours ending [id number]    General:  Alert, cooperative, no acute distress    HEENT: PERRLA, anicteric sclerae. Pulmonary: Decreased breath sounds at bases  Cardiovascular: Regular rate and Rhythm. GI:  Soft, Non distended, Non tender. + Bowel sounds. Extremities:  No edema, cyanosis, clubbing. No calf tenderness. Neurologic: Alert and oriented X 3. No acute neuro deficits.   Additional:    Medications:   Current Facility-Administered Medications   Medication Dose Route Frequency    ipratropium-albuterol (COMBIVENT RESPIMAT) 20 mcg-100 mcg inhalation spray  1 Puff Inhalation Q4H RT    budesonide-formoteroL (SYMBICORT) 160-4.5 mcg/actuation HFA inhaler 2 Puff  2 Puff Inhalation BID RT    insulin glargine (LANTUS) injection 10 Units  10 Units SubCUTAneous DAILY    tocilizumab (ACTEMRA) 810 mg in 0.9% sodium chloride 100 mL infusion  810 mg IntraVENous ONCE    sodium chloride (NS) flush 5-10 mL  5-10 mL IntraVENous PRN    azithromycin (ZITHROMAX) 500 mg in 0.9% sodium chloride 250 mL (VIAL-MATE)  500 mg IntraVENous Q24H    insulin lispro (HUMALOG) injection   SubCUTAneous AC&HS    glucose chewable tablet 16 g  16 g Oral PRN    glucagon (GLUCAGEN) injection 1 mg  1 mg IntraMUSCular PRN    dextrose 10% infusion 125-250 mL  125-250 mL IntraVENous PRN    dexamethasone (DECADRON) 4 mg/mL injection 10 mg  10 mg IntraVENous Q12H    acetaminophen (TYLENOL) tablet 500 mg  500 mg Oral Q6H PRN    ondansetron (ZOFRAN) injection 4 mg  4 mg IntraVENous Q4H PRN    DULoxetine (CYMBALTA) capsule 30 mg  30 mg Oral DAILY    gabapentin (NEURONTIN) capsule 100 mg  100 mg Oral TID    omega-3 acid ethyl esters (LOVAZA) capsule 1,000 mg  1 g Oral BID WITH MEALS    enoxaparin (LOVENOX) injection 40 mg  40 mg SubCUTAneous Q24H    hydrALAZINE (APRESOLINE) tablet 25 mg  25 mg Oral TID PRN     Current Outpatient Medications   Medication Sig    acetaminophen (TYLENOL) 325 mg tablet Take 2 Tabs by mouth every four (4) hours as needed for Pain. (Patient not taking: Reported on 6/2/2021)    LORazepam (ATIVAN) 1 mg tablet Take 0.5 Tabs by mouth every eight (8) hours as needed for Anxiety. Max Daily Amount: 1.5 mg. (Patient not taking: Reported on 6/2/2021)    ibuprofen (MOTRIN) 800 mg tablet Take 1 Tab by mouth every eight (8) hours as needed for Pain. Imaging:   XR Results (most recent):  Results from Hospital Encounter encounter on 01/30/22    XR CHEST PORT    Narrative  EXAM: XR CHEST PORT    INDICATION: 26 years Female. cough. ADDITIONAL HISTORY: None.     TECHNIQUE: Frontal view of the chest.    COMPARISON: 7/19/2021    FINDINGS:    Underpenetration of the lung bases. Obscuration of the left heart border and  left hemidiaphragm. The cardiac silhouette appears similar to prior. Dense opacity is at the left  lung base, blunting of the left costophrenic sulcus. Hazy opacity at the right  lung base. Diffuse bilateral interstitial opacities within the left greater than  right lungs. Additional airspace opacity at the peripheral aspect of the left  mid/upper lung zone. The right costophrenic sulcus is sharp. No definite pneumothorax. No acute osseous abnormality appreciated. Impression  1. Multifocal patchy airspace opacities involving the left greater than right  lung bases as well as the left midlung zone. Background of diffuse bilateral  interstitial opacities. Findings are most suspicious for multifocal pneumonia,  possible Covid 19 pneumonia; although other infectious or inflammatory process  is possible. Recommend radiographic follow-up within 6-8 weeks after appropriate  medical therapy and imaging follow-up until clearance. 2.  Possible left pleural effusion. CT Results (most recent):  Results from [place] encounter on 01/30/22    CTA CHEST W OR W WO CONT    Narrative  CTA CHEST PULMONARY EMBOLISM PROTOCOL    INDICATION: Morbid obesity, asthma with shortness of breath, wheezing, diarrhea,  cough, headache, myalgias, decreased appetite, loss of taste and smell, nausea,  vomiting, question of Covid. Question pulmonary embolism. TECHNIQUE: Thin collimation axial images obtained through the level of the  pulmonary arteries with additional imaging through the chest following the  uneventful administration of intravenous contrast.  Images reconstructed into  three dimensional coronal and sagittal projections for complete evaluation of  the tortuous and overlapping pulmonary vascular structures and to reduce patient  radiation dose.     All CT scans at this facility are performed using dose optimization technique as  appropriate to a performed exam, to include automated exposure control,  adjustment of the mA and/or kV according to patient size (including appropriate  matching first site-specific examinations), or use of iterative reconstruction  technique. COMPARISON: 2/1/2021    FINDINGS: Evaluation limited by body habitus. There is suboptimal contrast density to assess for chronic filling defects and  respiratory motion also limits assessment. No visible pulmonary emboli, though  many of the segments are nondiagnostic. Thyroid: Unremarkable in its visualized aspects. Pericardium/ Heart: No significant effusion. Aorta/ Vessels: No aneurysm or dissection. Lymph Nodes: 1.1 cm left periaortic node. 1.2 cm subcarinal node. 1.4 cm right  hilar node. .    Lungs: There are bilateral dense lung consolidations, left greater than right. The majority of the left lung is affected. Large consolidation in the right  middle lobe. Pleura: Trace bilateral pleural effusions. No pneumothorax. Upper Abdomen: Hepatic steatosis. The spleen is not completely included in the  field-of-view but may be enlarged. Bones/soft tissues: Degenerative disc disease. .    Impression  1. No visible acute pulmonary emboli, though several of the segments are  nondiagnostic secondary to respiratory motion. 2.  Multifocal pneumonia which may be bacterial or viral. If viral, is more  advanced given the dense consolidations. 3.  Trace bilateral pleural effusions which are atypical in Covid 19.  4.  Mild lymphadenopathy, likely reactive, also atypical in Covid 19.  5.  Hepatic steatosis. 6.  Incompletely visualized but potential splenomegaly.            Labs:    Recent Results (from the past 48 hour(s))   CBC WITH AUTOMATED DIFF    Collection Time: 01/30/22  1:18 PM   Result Value Ref Range    WBC 5.1 4.6 - 13.2 K/uL    RBC 4.82 4.20 - 5.30 M/uL    HGB 13.5 12.0 - 16.0 g/dL    HCT 43.7 35.0 - 45.0 %    MCV 90.7 78.0 - 100.0 FL    MCH 28.0 24.0 - 34.0 PG    MCHC 30.9 (L) 31.0 - 37.0 g/dL    RDW 14.3 11.6 - 14.5 %    PLATELET 753 178 - 810 K/uL    MPV 9.6 9.2 - 11.8 FL    NRBC 0.0 0  WBC    ABSOLUTE NRBC 0.00 0.00 - 0.01 K/uL    NEUTROPHILS 71 40 - 73 %    LYMPHOCYTES 24 21 - 52 %    MONOCYTES 4 3 - 10 %    EOSINOPHILS 0 0 - 5 %    BASOPHILS 0 0 - 2 %    IMMATURE GRANULOCYTES 1 (H) 0.0 - 0.5 %    ABS. NEUTROPHILS 3.6 1.8 - 8.0 K/UL    ABS. LYMPHOCYTES 1.2 0.9 - 3.6 K/UL    ABS. MONOCYTES 0.2 0.05 - 1.2 K/UL    ABS. EOSINOPHILS 0.0 0.0 - 0.4 K/UL    ABS. BASOPHILS 0.0 0.0 - 0.1 K/UL    ABS. IMM.  GRANS. 0.0 0.00 - 0.04 K/UL    DF AUTOMATED     HCG QL SERUM    Collection Time: 01/30/22  1:18 PM   Result Value Ref Range    HCG, Ql. Negative NEG     COVID-19 RAPID TEST    Collection Time: 01/30/22  1:20 PM   Result Value Ref Range    Specimen source Nasopharyngeal      COVID-19 rapid test Not detected NOTD     CULTURE, BLOOD    Collection Time: 01/30/22  4:00 PM    Specimen: Blood   Result Value Ref Range    Special Requests: RAC     Culture result: NO GROWTH AFTER 14 HOURS     PROTHROMBIN TIME + INR    Collection Time: 01/30/22  4:00 PM   Result Value Ref Range    Prothrombin time 14.1 11.5 - 15.2 sec    INR 1.1 0.8 - 1.2     PTT    Collection Time: 01/30/22  4:00 PM   Result Value Ref Range    aPTT 37.3 (H) 23.0 - 36.4 SEC   CULTURE, BLOOD    Collection Time: 01/30/22  4:15 PM    Specimen: Blood   Result Value Ref Range    Special Requests: RAC     Culture result: NO GROWTH AFTER 14 HOURS     POC LACTIC ACID    Collection Time: 01/30/22  4:20 PM   Result Value Ref Range    Lactic Acid (POC) 1.37 0.40 - 2.00 mmol/L   GLUCOSE, POC    Collection Time: 01/30/22  4:43 PM   Result Value Ref Range    Glucose (POC) 299 (H) 70 - 110 mg/dL   MAGNESIUM    Collection Time: 01/30/22  5:09 PM   Result Value Ref Range    Magnesium 1.7 1.6 - 2.6 mg/dL   METABOLIC PANEL, COMPREHENSIVE    Collection Time: 01/30/22  5:09 PM   Result Value Ref Range    Sodium 134 (L) 136 - 145 mmol/L    Potassium 3.8 3.5 - 5.5 mmol/L    Chloride 98 (L) 100 - 111 mmol/L    CO2 29 21 - 32 mmol/L    Anion gap 7 3.0 - 18 mmol/L    Glucose 304 (H) 74 - 99 mg/dL    BUN 9 7.0 - 18 MG/DL    Creatinine 0.80 0.6 - 1.3 MG/DL    BUN/Creatinine ratio 11 (L) 12 - 20      GFR est AA >60 >60 ml/min/1.73m2    GFR est non-AA >60 >60 ml/min/1.73m2    Calcium 7.3 (L) 8.5 - 10.1 MG/DL    Bilirubin, total 0.5 0.2 - 1.0 MG/DL    ALT (SGPT) 101 (H) 13 - 56 U/L    AST (SGOT) 103 (H) 10 - 38 U/L    Alk.  phosphatase 55 45 - 117 U/L    Protein, total 6.6 6.4 - 8.2 g/dL    Albumin 2.6 (L) 3.4 - 5.0 g/dL    Globulin 4.0 2.0 - 4.0 g/dL    A-G Ratio 0.7 (L) 0.8 - 1.7     C REACTIVE PROTEIN, QT    Collection Time: 01/30/22  5:09 PM   Result Value Ref Range    C-Reactive protein 8.8 (H) 0 - 0.3 mg/dL   FERRITIN    Collection Time: 01/30/22  5:09 PM   Result Value Ref Range    Ferritin 360 8 - 388 NG/ML   D DIMER    Collection Time: 01/30/22  5:09 PM   Result Value Ref Range    D DIMER 1.07 (H) <0.46 ug/ml(FEU)   LD    Collection Time: 01/30/22  5:09 PM   Result Value Ref Range     (H) 81 - 234 U/L   NT-PRO BNP    Collection Time: 01/30/22  5:09 PM   Result Value Ref Range    NT pro- 0 - 450 PG/ML   PROCALCITONIN    Collection Time: 01/30/22  5:09 PM   Result Value Ref Range    Procalcitonin 0.08 ng/mL   RESPIRATORY VIRUS PANEL W/COVID-19, PCR    Collection Time: 01/30/22  8:10 PM    Specimen: Nasopharyngeal   Result Value Ref Range    Adenovirus Not detected NOTD      Coronavirus 229E Not detected NOTD      Coronavirus HKU1 Not detected NOTD      Coronavirus CVNL63 Not detected NOTD      Coronavirus OC43 Not detected NOTD      SARS-CoV-2, PCR Detected (AA) NOTD      Metapneumovirus Not detected NOTD      Rhinovirus and Enterovirus Not detected NOTD      Influenza A Not detected NOTD      Influenza A, subtype H1 Not detected NOTD      Influenza A, subtype H3 Not detected NOTD      INFLUENZA A H1N1 PCR Not detected NOTD      Influenza B Not detected NOTD      Parainfluenza 1 Not detected NOTD      Parainfluenza 2 Not detected NOTD      Parainfluenza 3 Not detected NOTD      Parainfluenza virus 4 Not detected NOTD      RSV by PCR Not detected NOTD      B. parapertussis, PCR Not detected NOTD      Bordetella pertussis - PCR Not detected NOTD      Chlamydophila pneumoniae DNA, QL, PCR Not detected NOTD      Mycoplasma pneumoniae DNA, QL, PCR Not detected NOTD     EKG, 12 LEAD, INITIAL    Collection Time: 01/30/22  8:15 PM   Result Value Ref Range    Ventricular Rate 101 BPM    Atrial Rate 101 BPM    P-R Interval 146 ms    QRS Duration 96 ms    Q-T Interval 356 ms    QTC Calculation (Bezet) 461 ms    Calculated P Axis 59 degrees    Calculated R Axis 62 degrees    Calculated T Axis 43 degrees    Diagnosis       Sinus tachycardia  Otherwise normal ECG  When compared with ECG of 19-JUL-2021 00:33,  T wave inversion no longer evident in Inferior leads  Confirmed by Jaimee Moran [id number]) on 1/31/2022 7:56:16 AM     GLUCOSE, POC    Collection Time: 01/30/22 10:51 PM   Result Value Ref Range    Glucose (POC) 396 (H) 70 - 110 mg/dL   MAGNESIUM    Collection Time: 01/31/22  3:13 AM   Result Value Ref Range    Magnesium 2.1 1.6 - 2.6 mg/dL   PROCALCITONIN    Collection Time: 01/31/22  3:13 AM   Result Value Ref Range    Procalcitonin <8.81 ng/mL   METABOLIC PANEL, COMPREHENSIVE    Collection Time: 01/31/22  3:13 AM   Result Value Ref Range    Sodium 134 (L) 136 - 145 mmol/L    Potassium 3.8 3.5 - 5.5 mmol/L    Chloride 98 (L) 100 - 111 mmol/L    CO2 28 21 - 32 mmol/L    Anion gap 8 3.0 - 18 mmol/L    Glucose 375 (H) 74 - 99 mg/dL    BUN 13 7.0 - 18 MG/DL    Creatinine 0.83 0.6 - 1.3 MG/DL    BUN/Creatinine ratio 16 12 - 20      GFR est AA >60 >60 ml/min/1.73m2    GFR est non-AA >60 >60 ml/min/1.73m2    Calcium 8.0 (L) 8.5 - 10.1 MG/DL    Bilirubin, total 1.0 0.2 - 1.0 MG/DL    ALT (SGPT) 109 (H) 13 - 56 U/L    AST (SGOT) 88 (H) 10 - 38 U/L    Alk. phosphatase 63 45 - 117 U/L    Protein, total 7.1 6.4 - 8.2 g/dL    Albumin 3.0 (L) 3.4 - 5.0 g/dL    Globulin 4.1 (H) 2.0 - 4.0 g/dL    A-G Ratio 0.7 (L) 0.8 - 1.7     C REACTIVE PROTEIN, QT    Collection Time: 01/31/22  3:13 AM   Result Value Ref Range    C-Reactive protein 10.3 (H) 0 - 0.3 mg/dL   D DIMER    Collection Time: 01/31/22  3:13 AM   Result Value Ref Range    D DIMER 0.86 (H) <0.46 ug/ml(FEU)   CBC WITH AUTOMATED DIFF    Collection Time: 01/31/22  3:13 AM   Result Value Ref Range    WBC 5.8 4.6 - 13.2 K/uL    RBC 4.71 4.20 - 5.30 M/uL    HGB 13.1 12.0 - 16.0 g/dL    HCT 42.6 35.0 - 45.0 %    MCV 90.4 78.0 - 100.0 FL    MCH 27.8 24.0 - 34.0 PG    MCHC 30.8 (L) 31.0 - 37.0 g/dL    RDW 14.2 11.6 - 14.5 %    PLATELET 971 739 - 602 K/uL    MPV 9.9 9.2 - 11.8 FL    NRBC 0.0 0  WBC    ABSOLUTE NRBC 0.00 0.00 - 0.01 K/uL    NEUTROPHILS 80 (H) 40 - 73 %    LYMPHOCYTES 15 (L) 21 - 52 %    MONOCYTES 4 3 - 10 %    EOSINOPHILS 0 0 - 5 %    BASOPHILS 0 0 - 2 %    IMMATURE GRANULOCYTES 1 (H) 0.0 - 0.5 %    ABS. NEUTROPHILS 4.6 1.8 - 8.0 K/UL    ABS. LYMPHOCYTES 0.9 0.9 - 3.6 K/UL    ABS. MONOCYTES 0.2 0.05 - 1.2 K/UL    ABS. EOSINOPHILS 0.0 0.0 - 0.4 K/UL    ABS. BASOPHILS 0.0 0.0 - 0.1 K/UL    ABS. IMM.  GRANS. 0.1 (H) 0.00 - 0.04 K/UL    DF AUTOMATED     HEMOGLOBIN A1C WITH EAG    Collection Time: 01/31/22  3:13 AM   Result Value Ref Range    Hemoglobin A1c 12.0 (H) 4.2 - 5.6 %    Est. average glucose 298 mg/dL   GLUCOSE, POC    Collection Time: 01/31/22  8:16 AM   Result Value Ref Range    Glucose (POC) 334 (H) 70 - 110 mg/dL   GLUCOSE, POC    Collection Time: 01/31/22 12:50 PM   Result Value Ref Range    Glucose (POC) 344 (H) 70 - 110 mg/dL       Signed By: Muna Flores MD     January 31, 2022      I spent 25 minutes with the patient in face-to-face consultation, of which greater than 50% was spent in counseling and coordination of care as described above    Disclaimer: Sections of this note are dictated using utilizing voice recognition software. Minor typographical errors may be present. If questions arise, please do not hesitate to contact me or call our department.

## 2022-01-31 NOTE — CONSULTS
[facility]. Pulmonary, Critical Care, and Sleep Medicine    Initial Patient Consult    Name: Mika Kee MRN: [id number]   : 1995 Hospital: [facility]   Date: 2022        IMPRESSION:   · Acute Hypoxic Respiratory Failure  · - 2/2 covid19 pneumonia  · - currently on 10L salter- sats 90-93%  · Covid19 Pneumonia  · - symptoms for several weeks, +   · - baracitinib per ID  · Hx of Asthma  · - not on current therapy  · Morbid Obesity  · DM  · - A1C 12     Patient Active Problem List   Diagnosis Code    Acute respiratory failure with hypoxia ([facility] J96.01    Hypoxia R09.02    Pneumonia due to COVID-19 virus U07.1, J12.82      RECOMMENDATIONS:   · Wean O2 as tolerated, patient with increased work of breathing- may benefit from bipap- Will place PRN order. · Continue High dose decadron  · Baricitinib per ID  · Symbicort and Combivent  · Antibiotics per ID  · Trend Ddimer- may need dopplers    Very high risk of decompensation given the patient's size. · Will Follow     Subjective: This patient has been seen and evaluated at the request of Dr. Hailey Mejia for [id number]. Patient is a 23 y.o. female with morbid obesity and history of asthma who presented to the ED with covid19 pneumonia and respiratory distress. She was placed on 10L and admitted to the hospital. She was started on steroids and baricitinib. CT scan with scattered dense consolidations- not classic covid but potentially advanced given longer duration of symptoms of several weeks. She is complaining of a cough with occasional blood tinged sputum. She is complaining of sweating, nausea, upset stomach and diarrhea. No past medical history on file. Past Surgical History:   Procedure Laterality Date    HX APPENDECTOMY        Prior to Admission medications    Medication Sig Start Date End Date Taking?  Authorizing Provider   acetaminophen (TYLENOL) 325 mg tablet Take 2 Tabs by mouth every four (4) hours as needed for Pain. Patient not taking: Reported on 2021 3/16/21   MAXIMILIAN Johnson   LORazepam (ATIVAN) 1 mg tablet Take 0.5 Tabs by mouth every eight (8) hours as needed for Anxiety. Max Daily Amount: 1.5 mg. Patient not taking: Reported on 2021 2/15/21   Marjorie Sanders MD   ibuprofen (MOTRIN) 800 mg tablet Take 1 Tab by mouth every eight (8) hours as needed for Pain. 21   Marjorie Sanders MD     No Known Allergies   Social History     Tobacco Use    Smoking status: Current Every Day Smoker    Smokeless tobacco: Never Used   Substance Use Topics    Alcohol use: Not Currently      No family history on file. Current Facility-Administered Medications   Medication Dose Route Frequency    ipratropium-albuterol (COMBIVENT RESPIMAT) 20 mcg-100 mcg inhalation spray  1 Puff Inhalation Q4H RT    budesonide-formoteroL (SYMBICORT) 160-4.5 mcg/actuation HFA inhaler 2 Puff  2 Puff Inhalation BID RT    insulin glargine (LANTUS) injection 10 Units  10 Units SubCUTAneous DAILY    tocilizumab (ACTEMRA) 810 mg in 0.9% sodium chloride 100 mL infusion  810 mg IntraVENous ONCE    azithromycin (ZITHROMAX) 500 mg in 0.9% sodium chloride 250 mL (VIAL-MATE)  500 mg IntraVENous Q24H    insulin lispro (HUMALOG) injection   SubCUTAneous AC&HS    dexamethasone (DECADRON) 4 mg/mL injection 10 mg  10 mg IntraVENous Q12H    DULoxetine (CYMBALTA) capsule 30 mg  30 mg Oral DAILY    gabapentin (NEURONTIN) capsule 100 mg  100 mg Oral TID    omega-3 acid ethyl esters (LOVAZA) capsule 1,000 mg  1 g Oral BID WITH MEALS    enoxaparin (LOVENOX) injection 40 mg  40 mg SubCUTAneous Q24H       Review of Systems:  Pertinent items are noted in HPI.   ROS    Objective:   Vital Signs:    Visit Vitals  /75   Pulse 89   Temp 97.8 °F (36.6 °C)   Resp 21   Ht 5' 4\" (1.626 m)   Wt (!) 199.6 kg (440 lb)   SpO2 92%   BMI 75.53 kg/m²       O2 Device: Nasal cannula   O2 Flow Rate (L/min): 10 l/min   Temp (24hrs), Av.3 °F (36.8 °C), Min:97.8 °F (36.6 °C), Max:98.6 °F (37 °C)       Intake/Output:   Last shift:      No intake/output data recorded. Last 3 shifts: No intake/output data recorded. No intake or output data in the 24 hours ending 01/31/22 1623   Physical Exam:   General:  Alert, cooperative, no distress, appears stated age. Head:  Normocephalic, without obvious abnormality, atraumatic. Eyes:  Conjunctivae/corneas clear. ANicteric   Nose: Nares normal. Mucosa normal. No drainage or sinus tenderness. Throat: Lips, mucosa dry. NO thrush;    Neck: Supple, symmetrical, trachea midline, no adenopathy, thyroid: no enlargment/tenderness/nodules, no carotid bruit and no JVD. No crepitus   Back:   Symmetric, no curvature, no spine tenderness or flank pain   Lungs:   Bilateral auscultation ________   Chest wall:  No tenderness or deformity. NO CREPITUS   Heart:  Regular rate and rhythm, S1, S2 normal, no murmur, click, rub or gallop. Abdomen:   Soft, non-tender. Bowel sounds normal. No masses,  No organomegaly. No paradox   Extremities: normal, atraumatic, no cyanosis or edema. Pulses: 1-2+ and symmetric all extremities.    Skin: Skin color, texture, turgor normal. No rashes or lesions   Lymph nodes: Cervical, supraclavicular, and axillary nodes normal.   Neurologic: Grossly nonfocal          Data review:   Labs:  Recent Results (from the past 24 hour(s))   GLUCOSE, POC    Collection Time: 01/30/22  4:43 PM   Result Value Ref Range    Glucose (POC) 299 (H) 70 - 110 mg/dL   MAGNESIUM    Collection Time: 01/30/22  5:09 PM   Result Value Ref Range    Magnesium 1.7 1.6 - 2.6 mg/dL   METABOLIC PANEL, COMPREHENSIVE    Collection Time: 01/30/22  5:09 PM   Result Value Ref Range    Sodium 134 (L) 136 - 145 mmol/L    Potassium 3.8 3.5 - 5.5 mmol/L    Chloride 98 (L) 100 - 111 mmol/L    CO2 29 21 - 32 mmol/L    Anion gap 7 3.0 - 18 mmol/L    Glucose 304 (H) 74 - 99 mg/dL    BUN 9 7.0 - 18 MG/DL    Creatinine 0.80 0.6 - 1.3 MG/DL BUN/Creatinine ratio 11 (L) 12 - 20      GFR est AA >60 >60 ml/min/1.73m2    GFR est non-AA >60 >60 ml/min/1.73m2    Calcium 7.3 (L) 8.5 - 10.1 MG/DL    Bilirubin, total 0.5 0.2 - 1.0 MG/DL    ALT (SGPT) 101 (H) 13 - 56 U/L    AST (SGOT) 103 (H) 10 - 38 U/L    Alk.  phosphatase 55 45 - 117 U/L    Protein, total 6.6 6.4 - 8.2 g/dL    Albumin 2.6 (L) 3.4 - 5.0 g/dL    Globulin 4.0 2.0 - 4.0 g/dL    A-G Ratio 0.7 (L) 0.8 - 1.7     C REACTIVE PROTEIN, QT    Collection Time: 01/30/22  5:09 PM   Result Value Ref Range    C-Reactive protein 8.8 (H) 0 - 0.3 mg/dL   FERRITIN    Collection Time: 01/30/22  5:09 PM   Result Value Ref Range    Ferritin 360 8 - 388 NG/ML   D DIMER    Collection Time: 01/30/22  5:09 PM   Result Value Ref Range    D DIMER 1.07 (H) <0.46 ug/ml(FEU)   LD    Collection Time: 01/30/22  5:09 PM   Result Value Ref Range     (H) 81 - 234 U/L   NT-PRO BNP    Collection Time: 01/30/22  5:09 PM   Result Value Ref Range    NT pro- 0 - 450 PG/ML   PROCALCITONIN    Collection Time: 01/30/22  5:09 PM   Result Value Ref Range    Procalcitonin 0.08 ng/mL   RESPIRATORY VIRUS PANEL W/COVID-19, PCR    Collection Time: 01/30/22  8:10 PM    Specimen: Nasopharyngeal   Result Value Ref Range    Adenovirus Not detected NOTD      Coronavirus 229E Not detected NOTD      Coronavirus HKU1 Not detected NOTD      Coronavirus CVNL63 Not detected NOTD      Coronavirus OC43 Not detected NOTD      SARS-CoV-2, PCR Detected (AA) NOTD      Metapneumovirus Not detected NOTD      Rhinovirus and Enterovirus Not detected NOTD      Influenza A Not detected NOTD      Influenza A, subtype H1 Not detected NOTD      Influenza A, subtype H3 Not detected NOTD      INFLUENZA A H1N1 PCR Not detected NOTD      Influenza B Not detected NOTD      Parainfluenza 1 Not detected NOTD      Parainfluenza 2 Not detected NOTD      Parainfluenza 3 Not detected NOTD      Parainfluenza virus 4 Not detected NOTD      RSV by PCR Not detected NOTD B. parapertussis, PCR Not detected NOTD      Bordetella pertussis - PCR Not detected NOTD      Chlamydophila pneumoniae DNA, QL, PCR Not detected NOTD      Mycoplasma pneumoniae DNA, QL, PCR Not detected NOTD     EKG, 12 LEAD, INITIAL    Collection Time: 01/30/22  8:15 PM   Result Value Ref Range    Ventricular Rate 101 BPM    Atrial Rate 101 BPM    P-R Interval 146 ms    QRS Duration 96 ms    Q-T Interval 356 ms    QTC Calculation (Bezet) 461 ms    Calculated P Axis 59 degrees    Calculated R Axis 62 degrees    Calculated T Axis 43 degrees    Diagnosis       Sinus tachycardia  Otherwise normal ECG  When compared with ECG of 19-JUL-2021 00:33,  T wave inversion no longer evident in Inferior leads  Confirmed by Michael Quintanilla ([id number]) on 1/31/2022 7:56:16 AM     GLUCOSE, POC    Collection Time: 01/30/22 10:51 PM   Result Value Ref Range    Glucose (POC) 396 (H) 70 - 110 mg/dL   MAGNESIUM    Collection Time: 01/31/22  3:13 AM   Result Value Ref Range    Magnesium 2.1 1.6 - 2.6 mg/dL   PROCALCITONIN    Collection Time: 01/31/22  3:13 AM   Result Value Ref Range    Procalcitonin <0.71 ng/mL   METABOLIC PANEL, COMPREHENSIVE    Collection Time: 01/31/22  3:13 AM   Result Value Ref Range    Sodium 134 (L) 136 - 145 mmol/L    Potassium 3.8 3.5 - 5.5 mmol/L    Chloride 98 (L) 100 - 111 mmol/L    CO2 28 21 - 32 mmol/L    Anion gap 8 3.0 - 18 mmol/L    Glucose 375 (H) 74 - 99 mg/dL    BUN 13 7.0 - 18 MG/DL    Creatinine 0.83 0.6 - 1.3 MG/DL    BUN/Creatinine ratio 16 12 - 20      GFR est AA >60 >60 ml/min/1.73m2    GFR est non-AA >60 >60 ml/min/1.73m2    Calcium 8.0 (L) 8.5 - 10.1 MG/DL    Bilirubin, total 1.0 0.2 - 1.0 MG/DL    ALT (SGPT) 109 (H) 13 - 56 U/L    AST (SGOT) 88 (H) 10 - 38 U/L    Alk.  phosphatase 63 45 - 117 U/L    Protein, total 7.1 6.4 - 8.2 g/dL    Albumin 3.0 (L) 3.4 - 5.0 g/dL    Globulin 4.1 (H) 2.0 - 4.0 g/dL    A-G Ratio 0.7 (L) 0.8 - 1.7     C REACTIVE PROTEIN, QT    Collection Time: 01/31/22  3:13 AM Result Value Ref Range    C-Reactive protein 10.3 (H) 0 - 0.3 mg/dL   D DIMER    Collection Time: 01/31/22  3:13 AM   Result Value Ref Range    D DIMER 0.86 (H) <0.46 ug/ml(FEU)   CBC WITH AUTOMATED DIFF    Collection Time: 01/31/22  3:13 AM   Result Value Ref Range    WBC 5.8 4.6 - 13.2 K/uL    RBC 4.71 4.20 - 5.30 M/uL    HGB 13.1 12.0 - 16.0 g/dL    HCT 42.6 35.0 - 45.0 %    MCV 90.4 78.0 - 100.0 FL    MCH 27.8 24.0 - 34.0 PG    MCHC 30.8 (L) 31.0 - 37.0 g/dL    RDW 14.2 11.6 - 14.5 %    PLATELET 667 884 - 993 K/uL    MPV 9.9 9.2 - 11.8 FL    NRBC 0.0 0  WBC    ABSOLUTE NRBC 0.00 0.00 - 0.01 K/uL    NEUTROPHILS 80 (H) 40 - 73 %    LYMPHOCYTES 15 (L) 21 - 52 %    MONOCYTES 4 3 - 10 %    EOSINOPHILS 0 0 - 5 %    BASOPHILS 0 0 - 2 %    IMMATURE GRANULOCYTES 1 (H) 0.0 - 0.5 %    ABS. NEUTROPHILS 4.6 1.8 - 8.0 K/UL    ABS. LYMPHOCYTES 0.9 0.9 - 3.6 K/UL    ABS. MONOCYTES 0.2 0.05 - 1.2 K/UL    ABS. EOSINOPHILS 0.0 0.0 - 0.4 K/UL    ABS. BASOPHILS 0.0 0.0 - 0.1 K/UL    ABS. IMM. GRANS. 0.1 (H) 0.00 - 0.04 K/UL    DF AUTOMATED     HEMOGLOBIN A1C WITH EAG    Collection Time: 01/31/22  3:13 AM   Result Value Ref Range    Hemoglobin A1c 12.0 (H) 4.2 - 5.6 %    Est. average glucose 298 mg/dL   GLUCOSE, POC    Collection Time: 01/31/22  8:16 AM   Result Value Ref Range    Glucose (POC) 334 (H) 70 - 110 mg/dL   GLUCOSE, POC    Collection Time: 01/31/22 12:50 PM   Result Value Ref Range    Glucose (POC) 344 (H) 70 - 110 mg/dL     Imaging:  I have personally reviewed the patients radiographs and have reviewed the reports:  CXR Results  (Last 48 hours)               01/30/22 1254  XR CHEST PORT Final result    Impression:  1. Multifocal patchy airspace opacities involving the left greater than right   lung bases as well as the left midlung zone. Background of diffuse bilateral   interstitial opacities.  Findings are most suspicious for multifocal pneumonia,   possible Covid 19 pneumonia; although other infectious or inflammatory process   is possible. Recommend radiographic follow-up within 6-8 weeks after appropriate   medical therapy and imaging follow-up until clearance. 2.  Possible left pleural effusion. Narrative:  EXAM: XR CHEST PORT       INDICATION: 26 years Female. cough. ADDITIONAL HISTORY: None. TECHNIQUE: Frontal view of the chest.       COMPARISON: 7/19/2021       FINDINGS:       Underpenetration of the lung bases. Obscuration of the left heart border and   left hemidiaphragm. The cardiac silhouette appears similar to prior. Dense opacity is at the left   lung base, blunting of the left costophrenic sulcus. Hazy opacity at the right   lung base. Diffuse bilateral interstitial opacities within the left greater than   right lungs. Additional airspace opacity at the peripheral aspect of the left   mid/upper lung zone. The right costophrenic sulcus is sharp. No definite pneumothorax. No acute osseous abnormality appreciated. CT Results  (Last 48 hours)               01/30/22 1916  CTA CHEST W OR W WO CONT Final result    Impression:  1. No visible acute pulmonary emboli, though several of the segments are   nondiagnostic secondary to respiratory motion. 2.  Multifocal pneumonia which may be bacterial or viral. If viral, is more   advanced given the dense consolidations. 3.  Trace bilateral pleural effusions which are atypical in Covid 19.   4.  Mild lymphadenopathy, likely reactive, also atypical in Covid 19.   5.  Hepatic steatosis. 6.  Incompletely visualized but potential splenomegaly. Narrative:  CTA CHEST PULMONARY EMBOLISM PROTOCOL         INDICATION: Morbid obesity, asthma with shortness of breath, wheezing, diarrhea,   cough, headache, myalgias, decreased appetite, loss of taste and smell, nausea,   vomiting, question of Covid. Question pulmonary embolism.        TECHNIQUE: Thin collimation axial images obtained through the level of the   pulmonary arteries with additional imaging through the chest following the   uneventful administration of intravenous contrast.  Images reconstructed into   three dimensional coronal and sagittal projections for complete evaluation of   the tortuous and overlapping pulmonary vascular structures and to reduce patient   radiation dose. All CT scans at this facility are performed using dose optimization technique as   appropriate to a performed exam, to include automated exposure control,   adjustment of the mA and/or kV according to patient size (including appropriate   matching first site-specific examinations), or use of iterative reconstruction   technique. COMPARISON: 2/1/2021        FINDINGS: Evaluation limited by body habitus. There is suboptimal contrast density to assess for chronic filling defects and   respiratory motion also limits assessment. No visible pulmonary emboli, though   many of the segments are nondiagnostic. Thyroid: Unremarkable in its visualized aspects. Pericardium/ Heart: No significant effusion. Aorta/ Vessels: No aneurysm or dissection. Lymph Nodes: 1.1 cm left periaortic node. 1.2 cm subcarinal node. 1.4 cm right   hilar node. .       Lungs: There are bilateral dense lung consolidations, left greater than right. The majority of the left lung is affected. Large consolidation in the right   middle lobe. Pleura: Trace bilateral pleural effusions. No pneumothorax. Upper Abdomen: Hepatic steatosis. The spleen is not completely included in the   field-of-view but may be enlarged. Bones/soft tissues: Degenerative disc disease. .                     High complexity decision making was performed during the evaluation of this patient at high risk for decompensation with multiple organ involvement     Above mentioned total time spent on reviewing the case/medical record/data/notes/EMR/patient examination/documentation/coordinating care with nurse/consultants, exclusive of procedures with complex decision making performed and > 50% time spent in face to face evaluation.      Cleo Reyna MD

## 2022-01-31 NOTE — PROGRESS NOTES
OT orders received and medical chart review completed. Per medical chart review: pt is ambulating to bathroom and toileting independently without difficulty flavia ED. Pt appears to be at baseline as PLOF while on droplet+ isolation. No formal OT evaluation indicated. OT to discontinue orders.     Thank you for this referral,  Bipin Mohr MS OTR/L

## 2022-02-01 LAB
ALBUMIN SERPL-MCNC: 2.9 G/DL (ref 3.4–5)
ALBUMIN/GLOB SERPL: 0.7 {RATIO} (ref 0.8–1.7)
ALP SERPL-CCNC: 62 U/L (ref 45–117)
ALT SERPL-CCNC: 94 U/L (ref 13–56)
ANION GAP SERPL CALC-SCNC: 9 MMOL/L (ref 3–18)
AST SERPL-CCNC: 62 U/L (ref 10–38)
BILIRUB SERPL-MCNC: 0.6 MG/DL (ref 0.2–1)
BUN SERPL-MCNC: 15 MG/DL (ref 7–18)
BUN/CREAT SERPL: 22 (ref 12–20)
CALCIUM SERPL-MCNC: 8.7 MG/DL (ref 8.5–10.1)
CHLORIDE SERPL-SCNC: 99 MMOL/L (ref 100–111)
CO2 SERPL-SCNC: 28 MMOL/L (ref 21–32)
CREAT SERPL-MCNC: 0.67 MG/DL (ref 0.6–1.3)
CRP SERPL-MCNC: 5.2 MG/DL (ref 0–0.3)
D DIMER PPP FEU-MCNC: 0.49 UG/ML(FEU)
GLOBULIN SER CALC-MCNC: 4.2 G/DL (ref 2–4)
GLUCOSE BLD STRIP.AUTO-MCNC: 302 MG/DL (ref 70–110)
GLUCOSE BLD STRIP.AUTO-MCNC: 317 MG/DL (ref 70–110)
GLUCOSE BLD STRIP.AUTO-MCNC: 334 MG/DL (ref 70–110)
GLUCOSE BLD STRIP.AUTO-MCNC: 407 MG/DL (ref 70–110)
GLUCOSE BLD STRIP.AUTO-MCNC: 412 MG/DL (ref 70–110)
GLUCOSE SERPL-MCNC: 327 MG/DL (ref 74–99)
MAGNESIUM SERPL-MCNC: 2.7 MG/DL (ref 1.6–2.6)
POTASSIUM SERPL-SCNC: 3.9 MMOL/L (ref 3.5–5.5)
PROCALCITONIN SERPL-MCNC: <0.05 NG/ML
PROT SERPL-MCNC: 7.1 G/DL (ref 6.4–8.2)
SODIUM SERPL-SCNC: 136 MMOL/L (ref 136–145)

## 2022-02-01 PROCEDURE — 86140 C-REACTIVE PROTEIN: CPT

## 2022-02-01 PROCEDURE — 80053 COMPREHEN METABOLIC PANEL: CPT

## 2022-02-01 PROCEDURE — 65660000000 HC RM CCU STEPDOWN

## 2022-02-01 PROCEDURE — 2709999900 HC NON-CHARGEABLE SUPPLY

## 2022-02-01 PROCEDURE — 94640 AIRWAY INHALATION TREATMENT: CPT

## 2022-02-01 PROCEDURE — 99232 SBSQ HOSP IP/OBS MODERATE 35: CPT | Performed by: HOSPITALIST

## 2022-02-01 PROCEDURE — 74011250636 HC RX REV CODE- 250/636: Performed by: INTERNAL MEDICINE

## 2022-02-01 PROCEDURE — 99233 SBSQ HOSP IP/OBS HIGH 50: CPT | Performed by: INTERNAL MEDICINE

## 2022-02-01 PROCEDURE — 84145 PROCALCITONIN (PCT): CPT

## 2022-02-01 PROCEDURE — 36415 COLL VENOUS BLD VENIPUNCTURE: CPT

## 2022-02-01 PROCEDURE — 74011636637 HC RX REV CODE- 636/637: Performed by: HOSPITALIST

## 2022-02-01 PROCEDURE — 74011250637 HC RX REV CODE- 250/637: Performed by: INTERNAL MEDICINE

## 2022-02-01 PROCEDURE — 83735 ASSAY OF MAGNESIUM: CPT

## 2022-02-01 PROCEDURE — 94762 N-INVAS EAR/PLS OXIMTRY CONT: CPT

## 2022-02-01 PROCEDURE — 77010033678 HC OXYGEN DAILY

## 2022-02-01 PROCEDURE — 85379 FIBRIN DEGRADATION QUANT: CPT

## 2022-02-01 PROCEDURE — 82962 GLUCOSE BLOOD TEST: CPT

## 2022-02-01 RX ORDER — INSULIN GLARGINE 100 [IU]/ML
0.1 INJECTION, SOLUTION SUBCUTANEOUS DAILY
Status: DISCONTINUED | OUTPATIENT
Start: 2022-02-02 | End: 2022-02-02

## 2022-02-01 RX ORDER — INSULIN LISPRO 100 [IU]/ML
0.05 INJECTION, SOLUTION INTRAVENOUS; SUBCUTANEOUS
Status: DISCONTINUED | OUTPATIENT
Start: 2022-02-01 | End: 2022-02-02

## 2022-02-01 RX ORDER — INSULIN GLARGINE 100 [IU]/ML
10 INJECTION, SOLUTION SUBCUTANEOUS
Status: COMPLETED | OUTPATIENT
Start: 2022-02-01 | End: 2022-02-01

## 2022-02-01 RX ORDER — DEXAMETHASONE SODIUM PHOSPHATE 4 MG/ML
10 INJECTION, SOLUTION INTRA-ARTICULAR; INTRALESIONAL; INTRAMUSCULAR; INTRAVENOUS; SOFT TISSUE EVERY 24 HOURS
Status: DISCONTINUED | OUTPATIENT
Start: 2022-02-04 | End: 2022-02-08 | Stop reason: HOSPADM

## 2022-02-01 RX ADMIN — BUDESONIDE AND FORMOTEROL FUMARATE DIHYDRATE 2 PUFF: 160; 4.5 AEROSOL RESPIRATORY (INHALATION) at 20:00

## 2022-02-01 RX ADMIN — GABAPENTIN 100 MG: 100 CAPSULE ORAL at 16:00

## 2022-02-01 RX ADMIN — IPRATROPIUM BROMIDE AND ALBUTEROL 1 PUFF: 20; 100 SPRAY, METERED RESPIRATORY (INHALATION) at 08:00

## 2022-02-01 RX ADMIN — Medication 10 UNITS: at 13:39

## 2022-02-01 RX ADMIN — Medication 10 UNITS: at 13:38

## 2022-02-01 RX ADMIN — OMEGA-3-ACID ETHYL ESTERS 1000 MG: 1 CAPSULE, LIQUID FILLED ORAL at 09:26

## 2022-02-01 RX ADMIN — GABAPENTIN 100 MG: 100 CAPSULE ORAL at 23:20

## 2022-02-01 RX ADMIN — Medication 12 UNITS: at 13:37

## 2022-02-01 RX ADMIN — IPRATROPIUM BROMIDE AND ALBUTEROL 1 PUFF: 20; 100 SPRAY, METERED RESPIRATORY (INHALATION) at 04:00

## 2022-02-01 RX ADMIN — OMEGA-3-ACID ETHYL ESTERS 1000 MG: 1 CAPSULE, LIQUID FILLED ORAL at 18:51

## 2022-02-01 RX ADMIN — Medication 10 UNITS: at 18:50

## 2022-02-01 RX ADMIN — BUDESONIDE AND FORMOTEROL FUMARATE DIHYDRATE 2 PUFF: 160; 4.5 AEROSOL RESPIRATORY (INHALATION) at 08:00

## 2022-02-01 RX ADMIN — Medication 15 UNITS: at 23:18

## 2022-02-01 RX ADMIN — DEXAMETHASONE SODIUM PHOSPHATE 10 MG: 4 INJECTION, SOLUTION INTRAMUSCULAR; INTRAVENOUS at 23:18

## 2022-02-01 RX ADMIN — IPRATROPIUM BROMIDE AND ALBUTEROL 1 PUFF: 20; 100 SPRAY, METERED RESPIRATORY (INHALATION) at 12:00

## 2022-02-01 RX ADMIN — Medication 12 UNITS: at 09:36

## 2022-02-01 RX ADMIN — Medication 10 UNITS: at 09:25

## 2022-02-01 RX ADMIN — DEXAMETHASONE SODIUM PHOSPHATE 10 MG: 4 INJECTION, SOLUTION INTRAMUSCULAR; INTRAVENOUS at 13:18

## 2022-02-01 RX ADMIN — GABAPENTIN 100 MG: 100 CAPSULE ORAL at 09:25

## 2022-02-01 RX ADMIN — Medication 12 UNITS: at 18:50

## 2022-02-01 RX ADMIN — ENOXAPARIN SODIUM 40 MG: 100 INJECTION SUBCUTANEOUS at 17:00

## 2022-02-01 RX ADMIN — IPRATROPIUM BROMIDE AND ALBUTEROL 1 PUFF: 20; 100 SPRAY, METERED RESPIRATORY (INHALATION) at 20:00

## 2022-02-01 RX ADMIN — DULOXETINE 30 MG: 30 CAPSULE, DELAYED RELEASE ORAL at 09:24

## 2022-02-01 RX ADMIN — IPRATROPIUM BROMIDE AND ALBUTEROL 1 PUFF: 20; 100 SPRAY, METERED RESPIRATORY (INHALATION) at 18:51

## 2022-02-01 NOTE — ED NOTES
Spoke with Dannielle Kaye at [facility] [phone] for bariatric bed, confirmation #[id number]

## 2022-02-01 NOTE — PROGRESS NOTES
Pt seen & evaluated in ER - spoke with pt & pt's  on phone - explained to him the current Rx Plan. He verbalized understanding. Will follow.

## 2022-02-01 NOTE — PROGRESS NOTES
Called [facility] Negrete to get ETA on bed order. Per  ebony/ Shirin Bryan ticket cancelled/no further notes on account, will re-order bariatric bed due to error.

## 2022-02-01 NOTE — DIABETES MGMT
Diabetes Patient/Family Education Record    Factors That May Influence Patients Ability to Learn or Comply with Recommendations   []   Language barrier    []   Cultural needs   []   Motivation    []   Cognitive limitation    []   Physical   [x]   Education    []   Physiological factors   []   Hearing/vision/speaking impairment   []   Jehovah's witness beliefs    [x]   Financial factors - relies on food stamps for groceries   []  Other:   []  No factors identified at this time. Person Instructed:   [x]   Patient   []   Family   []  Other     Preference for Learning:   [x]   Verbal   [x]   Written - DM education packet, BG monitoring and targets, DM meal planning, A1c and target   [x]  Demonstration - meter     Level of Comprehension & Competence:    [x]  Good                                      [] Fair                                     []  Poor                             [x]  Needs Reinforcement   [x]  Teach back completed    Education Component: - see DM note    []  Medication management, including how to administer insulin (if appropriate) and potential medication interactions    [x]  Nutritional management - [] Obtained usual meal pattern   []   Basic carbohydrate counting  [x]  Plate method  [x]  Limit concentrated sweets and avoid sweetened beverages  []  Portion control  []    Avoid skipping meals  Started DM meal planning education - typically consumes fast food and regular soda, juice.   Reviewed alternatives    [x]  Exercise - states difficulty ambulating due to neuropathy    []  Signs, symptoms, and treatment of hyperglycemia and hypoglycemia   [] Prevention, recognition and treatment of hyperglycemia and hypoglycemia   [x]  Importance of blood glucose monitoring  [x] Blood Glucose targets   [x]   Provided patient with blood glucose meter  []  Has glucometer and supplies at home   [x]  Instruction on use of the blood glucose meter and recommended monitoring schedule   [x]  Discuss the importance of HbA1C monitoring. Patients A1c is 12 %.  This is equivalent to average glucose of   298 mg/dl for the past 2-3 months.   []  Sick day guidelines   []  Proper use and disposal of lancets, needles, syringes or insulin pens (if appropriate)   []  Potential long-term complications (retinopathy, kidney disease, neuropathy, foot care)   [x] Information about whom to contact in case of emergency or for more information - is followed by [facility]    []  Goal:  Patient/family will demonstrate understanding of Diabetes Self- Management Skills by: (date) _______  Plan for post-discharge education or self-management support:    [] Outpatient class schedule provided            [] Patient Declined    [] Scheduled for outpatient classes (date) _______    [x] Written information provided  Verify: [] Prior to admission Diabetes medications  N/A  Does patient understand how diabetes medications work? __started education today__________________________  Does patient have difficulty obtaining diabetes medications or testing supplies? ____new Dx_____________    Josue Priest MPH RN [facility] [place]  Pager [phone]  Office [phone]

## 2022-02-01 NOTE — DIABETES MGMT
Diabetes/ Glycemic Control Plan of Care    Recommendations:   Increase lantus to 20 units daily  (0.1 units/kg)  Add mealtime humalog 10 units  (0.05 units/kg) - orders obtained     Assessment:   New dx T2DM found in setting of COVID-19 pneumonia, steroids, super morbid obesity, neuropathy. All BG above 300 mg/dl -  mg/dl this morning  Recommend increased dose of lantus and add mealtime humalog. Already receiving corrective humalog and required 44 units yesterday. Spoke with patient via phone and started DM education -   Will continue to monitor and follow for DM, meter, insulin education -     DX:   1. Acute respiratory failure with hypoxia (HCC)        Fasting/ Morning blood glucose:   Lab Results   Component Value Date/Time    Glucose 327 (H) 02/01/2022 04:06 AM    Glucose (POC) 317 (H) 02/01/2022 09:30 AM     IV Fluids containing dextrose: no  Steroids:   Rx Glucocorticoids (24h ago, onward)             Start     Dose Route Frequency Ordered Stop    01/31/22 0000  dexamethasone (DECADRON) 4 mg/mL injection 10 mg         10 mg IV EVERY 12 HOURS 01/30/22 1516 --           Blood glucose values: Within target range (70-180mg/dL): no    Current insulin orders:   lantus 10 units daily  Corrective humalog, very insulin resistant, 4 times daily    Total Daily Dose previous 24 hours = 54 units  44 units humalog  10 units lantus    Current A1c:   Lab Results   Component Value Date/Time    Hemoglobin A1c 12.0 (H) 01/31/2022 03:13 AM      equivalent  to ave Blood Glucose of 298 mg/dl for 2-3 months prior to admission  Adequate glycemic control PTA:no    Nutrition/Diet:   Active Orders   Diet    ADULT DIET Regular; 4 carb choices (60 gm/meal); No Concentrated Sweets      Meal Intake:  No data found. Supplement Intake:  No data found. Home diabetes medications:   Key Antihyperglycemic Medications     Patient is on no antihyperglycemic meds.         Plan/Goals:   Blood glucose will be within target of 70 - 180 mg/dl within 72 hours  Reinforce dietary and medication compliance at home.        Education:  [x] Refer to Diabetes Education Record-                        [] Education not indicated at this time -    Saint Ina MPH RN [address]  Pager [phone]  Office [phone]

## 2022-02-01 NOTE — PROGRESS NOTES
[facility]  Pulmonary, Critical Care, and Sleep Medicine    Pulmonary Medicine Progress Note    Name: Shirlene Ham MRN: [id number]  : 1995 Hospital: [address] [place] [address]  Date: 2022       Subjective:      Patient Active Problem List   Diagnosis Code    Acute respiratory failure with hypoxia ([facility] [address] J96.01    Hypoxia R09.02    Pneumonia due to COVID-19 virus U07.1, J12.82       Assessment:  · Acute Hypoxic Respiratory Failure  · - 2/2 covid19 pneumonia  · - currently on 10L salter- sats 90-93%  · Covid19 Pneumonia  · - symptoms for several weeks, +   · - baracitinib per ID  · Hx of Asthma  · - not on current therapy  · Morbid Obesity  · DM  · - A1C 12    Impression/Plan:  · Wean O2 as tolerated, appears better today. On 12L, wean to >90%  · Continue High dose decadron  · Baricitinib per ID  · Symbicort and Combivent  · Antibiotics per ID  · Trend Ddimer- may need dopplers     High risk of decompensation given the patient's size.      · Will Follow    FiO2 to keep SpO2 >=92%, HOB >=30 degree, aspiration precautions, aggressive pulmonary toileting, incentive spirometry. Other issues management by primary team and respective consultants. Events and notes from last 24 hours reviewed. Discussed with patient and family, answered all questions to their satisfaction. Care plan discussed with nursing.      Labs and images personally seen and available reports reviewed  All current medicines are reviewed       Medications- Current:  Current Facility-Administered Medications   Medication Dose Route Frequency    [START ON 2022] insulin glargine (LANTUS) injection 20 Units  0.1 Units/kg SubCUTAneous DAILY    insulin lispro (HUMALOG) injection 10 Units  0.05 Units/kg SubCUTAneous TIDAC    ipratropium-albuterol (COMBIVENT RESPIMAT) 20 mcg-100 mcg inhalation spray  1 Puff Inhalation Q4H RT    budesonide-formoteroL (SYMBICORT) 160-4.5 mcg/actuation HFA inhaler 2 Puff  2 Puff Inhalation BID RT    sodium chloride (NS) flush 5-10 mL  5-10 mL IntraVENous PRN    azithromycin (ZITHROMAX) 500 mg in 0.9% sodium chloride 250 mL (VIAL-MATE)  500 mg IntraVENous Q24H    insulin lispro (HUMALOG) injection   SubCUTAneous AC&HS    glucose chewable tablet 16 g  16 g Oral PRN    glucagon (GLUCAGEN) injection 1 mg  1 mg IntraMUSCular PRN    dextrose 10% infusion 125-250 mL  125-250 mL IntraVENous PRN    dexamethasone (DECADRON) 4 mg/mL injection 10 mg  10 mg IntraVENous Q12H    acetaminophen (TYLENOL) tablet 500 mg  500 mg Oral Q6H PRN    ondansetron (ZOFRAN) injection 4 mg  4 mg IntraVENous Q4H PRN    DULoxetine (CYMBALTA) capsule 30 mg  30 mg Oral DAILY    gabapentin (NEURONTIN) capsule 100 mg  100 mg Oral TID    omega-3 acid ethyl esters (LOVAZA) capsule 1,000 mg  1 g Oral BID WITH MEALS    enoxaparin (LOVENOX) injection 40 mg  40 mg SubCUTAneous Q24H    hydrALAZINE (APRESOLINE) tablet 25 mg  25 mg Oral TID PRN     Current Outpatient Medications   Medication Sig    acetaminophen (TYLENOL) 325 mg tablet Take 2 Tabs by mouth every four (4) hours as needed for Pain. (Patient not taking: Reported on 2021)    LORazepam (ATIVAN) 1 mg tablet Take 0.5 Tabs by mouth every eight (8) hours as needed for Anxiety. Max Daily Amount: 1.5 mg. (Patient not taking: Reported on 2021)    ibuprofen (MOTRIN) 800 mg tablet Take 1 Tab by mouth every eight (8) hours as needed for Pain. Objective:  Vital Signs:    Visit Vitals  BP (!) 146/70   Pulse 62   Temp 98.5 °F (36.9 °C)   Resp 24   Ht 5' 4\" (1.626 m)   Wt (!) 199.6 kg (440 lb)   SpO2 92%   BMI 75.53 kg/m²      O2 Device: Nasal cannula  O2 Flow Rate (L/min): 10 l/min  Temp (24hrs), Av.5 °F (36.9 °C), Min:98.5 °F (36.9 °C), Max:98.5 °F (36.9 °C)      Intake/Output:   Last shift:      No intake/output data recorded. Last 3 shifts: No intake/output data recorded.   No intake or output data in the 24 hours ending 22 1542    Physical Exam:     General/Neurology: Alert, Awake  Head:   Normocephalic, without obvious abnormality  Eye:   PERRL, EOM intact  Oral:  Mucus membranes moist  Lung:   B/l air entry fair. No rales. No rhonchi. No wheezing  Heart:   S1 S2 present  Abdomen:  Soft, non tender, BS+nt   Extremities:  No pedal edema. Skin:   Dry, intact  Data:      Recent Results (from the past 24 hour(s))   GLUCOSE, POC    Collection Time: 01/31/22  6:08 PM   Result Value Ref Range    Glucose (POC) 307 (H) 70 - 110 mg/dL   GLUCOSE, POC    Collection Time: 01/31/22 11:33 PM   Result Value Ref Range    Glucose (POC) 329 (H) 70 - 110 mg/dL   MAGNESIUM    Collection Time: 02/01/22  4:06 AM   Result Value Ref Range    Magnesium 2.7 (H) 1.6 - 2.6 mg/dL   METABOLIC PANEL, COMPREHENSIVE    Collection Time: 02/01/22  4:06 AM   Result Value Ref Range    Sodium 136 136 - 145 mmol/L    Potassium 3.9 3.5 - 5.5 mmol/L    Chloride 99 (L) 100 - 111 mmol/L    CO2 28 21 - 32 mmol/L    Anion gap 9 3.0 - 18 mmol/L    Glucose 327 (H) 74 - 99 mg/dL    BUN 15 7.0 - 18 MG/DL    Creatinine 0.67 0.6 - 1.3 MG/DL    BUN/Creatinine ratio 22 (H) 12 - 20      GFR est AA >60 >60 ml/min/1.73m2    GFR est non-AA >60 >60 ml/min/1.73m2    Calcium 8.7 8.5 - 10.1 MG/DL    Bilirubin, total 0.6 0.2 - 1.0 MG/DL    ALT (SGPT) 94 (H) 13 - 56 U/L    AST (SGOT) 62 (H) 10 - 38 U/L    Alk.  phosphatase 62 45 - 117 U/L    Protein, total 7.1 6.4 - 8.2 g/dL    Albumin 2.9 (L) 3.4 - 5.0 g/dL    Globulin 4.2 (H) 2.0 - 4.0 g/dL    A-G Ratio 0.7 (L) 0.8 - 1.7     C REACTIVE PROTEIN, QT    Collection Time: 02/01/22  4:06 AM   Result Value Ref Range    C-Reactive protein 5.2 (H) 0 - 0.3 mg/dL   PROCALCITONIN    Collection Time: 02/01/22  4:06 AM   Result Value Ref Range    Procalcitonin <0.05 ng/mL   D DIMER    Collection Time: 02/01/22  4:06 AM   Result Value Ref Range    D DIMER 0.49 (H) <0.46 ug/ml(FEU)   GLUCOSE, POC    Collection Time: 02/01/22  9:30 AM   Result Value Ref Range Glucose (POC) 317 (H) 70 - 110 mg/dL   GLUCOSE, POC    Collection Time: 02/01/22  1:24 PM   Result Value Ref Range    Glucose (POC) 302 (H) 70 - 110 mg/dL         Chemistry   Recent Labs     02/01/22  0406 01/31/22  0313 01/30/22  1709   * 375* 304*    134* 134*   K 3.9 3.8 3.8   CL 99* 98* 98*   CO2 28 28 29   BUN 15 13 9   CREA 0.67 0.83 0.80   CA 8.7 8.0* 7.3*   MG 2.7* 2.1 1.7   AGAP 9 8 7   BUCR 22* 16 11*   AP 62 63 55   TP 7.1 7.1 6.6   ALB 2.9* 3.0* 2.6*   GLOB 4.2* 4.1* 4.0   AGRAT 0.7* 0.7* 0.7*       CBC w/Diff   Recent Labs     01/31/22  0313 01/30/22  1318   WBC 5.8 5.1   RBC 4.71 4.82   HGB 13.1 13.5   HCT 42.6 43.7    185   GRANS 80* 71   LYMPH 15* 24   EOS 0 0       ABG No results for input(s): PHI, PHI, POC2, PCO2I, PO2, PO2I, HCO3, HCO3I, FIO2, FIO2I in the last 72 hours. Micro    Recent Labs     01/30/22  1615 01/30/22  1600   CULT NO GROWTH 2 DAYS GRAM POSITIVE COCCI IN CLUSTERS GROWING IN THE ANAEROBIC BOTTLE , SITE = [facility]*     Recent Labs     01/30/22  1615 01/30/22  1600   CULT NO GROWTH 2 DAYS GRAM POSITIVE COCCI IN CLUSTERS GROWING IN THE ANAEROBIC BOTTLE , SITE = [facility]*       CT (Most Recent) Results from Hospital Encounter encounter on 01/30/22    CTA CHEST W OR W WO CONT    Narrative  CTA CHEST PULMONARY EMBOLISM PROTOCOL    INDICATION: Morbid obesity, asthma with shortness of breath, wheezing, diarrhea,  cough, headache, myalgias, decreased appetite, loss of taste and smell, nausea,  vomiting, question of Covid. Question pulmonary embolism. TECHNIQUE: Thin collimation axial images obtained through the level of the  pulmonary arteries with additional imaging through the chest following the  uneventful administration of intravenous contrast.  Images reconstructed into  three dimensional coronal and sagittal projections for complete evaluation of  the tortuous and overlapping pulmonary vascular structures and to reduce patient  radiation dose.     All CT scans at this facility are performed using dose optimization technique as  appropriate to a performed exam, to include automated exposure control,  adjustment of the mA and/or kV according to patient size (including appropriate  matching first site-specific examinations), or use of iterative reconstruction  technique. COMPARISON: 2/1/2021    FINDINGS: Evaluation limited by body habitus. There is suboptimal contrast density to assess for chronic filling defects and  respiratory motion also limits assessment. No visible pulmonary emboli, though  many of the segments are nondiagnostic. Thyroid: Unremarkable in its visualized aspects. Pericardium/ Heart: No significant effusion. Aorta/ Vessels: No aneurysm or dissection. Lymph Nodes: 1.1 cm left periaortic node. 1.2 cm subcarinal node. 1.4 cm right  hilar node. .    Lungs: There are bilateral dense lung consolidations, left greater than right. The majority of the left lung is affected. Large consolidation in the right  middle lobe. Pleura: Trace bilateral pleural effusions. No pneumothorax. Upper Abdomen: Hepatic steatosis. The spleen is not completely included in the  field-of-view but may be enlarged. Bones/soft tissues: Degenerative disc disease. .    Impression  1. No visible acute pulmonary emboli, though several of the segments are  nondiagnostic secondary to respiratory motion. 2.  Multifocal pneumonia which may be bacterial or viral. If viral, is more  advanced given the dense consolidations. 3.  Trace bilateral pleural effusions which are atypical in Covid 19.  4.  Mild lymphadenopathy, likely reactive, also atypical in Covid 19.  5.  Hepatic steatosis. 6.  Incompletely visualized but potential splenomegaly. XR (Most Recent). CXR reviewed by me and compared with previous CXR Results from Hospital Encounter encounter on 01/30/22    XR CHEST PORT    Narrative  EXAM: XR CHEST PORT    INDICATION: 26 years Female. cough.   ADDITIONAL HISTORY: None.    TECHNIQUE: Frontal view of the chest.    COMPARISON: 7/19/2021    FINDINGS:    Underpenetration of the lung bases. Obscuration of the left heart border and  left hemidiaphragm. The cardiac silhouette appears similar to prior. Dense opacity is at the left  lung base, blunting of the left costophrenic sulcus. Hazy opacity at the right  lung base. Diffuse bilateral interstitial opacities within the left greater than  right lungs. Additional airspace opacity at the peripheral aspect of the left  mid/upper lung zone. The right costophrenic sulcus is sharp. No definite pneumothorax. No acute osseous abnormality appreciated. Impression  1. Multifocal patchy airspace opacities involving the left greater than right  lung bases as well as the left midlung zone. Background of diffuse bilateral  interstitial opacities. Findings are most suspicious for multifocal pneumonia,  possible Covid 19 pneumonia; although other infectious or inflammatory process  is possible. Recommend radiographic follow-up within 6-8 weeks after appropriate  medical therapy and imaging follow-up until clearance. 2.  Possible left pleural effusion. See my orders for details     Total care time exclusive of procedures with complex decision making, coordination of care and counseling patient performed and > 50% time spent in face to face evaluation as mentioned above.     Alin Rivera MD  Critical Care Medicine

## 2022-02-01 NOTE — PROGRESS NOTES
[facility]ist [facility]  Progress Note    Patient: Hawk Ramirez Age: 23 y.o. : 1995 MR#: [id number] SSN: [id number]  Date/Time: 2022     Subjective:     Patient seen and evaluated, lying in bed, no acute distress. 80-year-old female admitted to the hospital secondary to acute hypoxic respiratory failure from COVID-19 pneumonia. Patient is currently on 10 L salter. ID and pulmonary on board        Assessment/Plan:     1. COVID-19 pneumonia-continue IV Decadron, recommend Tocilizumab, discontinue antibiotics after today's dose, monitor inflammatory markers. CRP trending down. 2. Acute hypoxic respiratory failure secondary to COVID-19 pneumonia-continue supplemental oxygen on 10 L salter, pulmonary on board. 3. History of neuropathy-continue Neurontin and Cymbalta. 4. Type 2 diabetes-A1c 12, continue Lantus and insulin sliding scale, diabetic education on board. 5. Super morbid obesity  DVT prophylaxis-Lovenox  Full code    Discussed with  over the phone. Emmy Mandel MD  23      Case discussed with:  []Patient  []Family  []Nursing  []Case Management  DVT Prophylaxis:  []Lovenox  []Hep SQ  []SCDs  []Coumadin   []On Heparin gtt    Objective:   VS:   Visit Vitals  BP (!) 146/70   Pulse 62   Temp 98.5 °F (36.9 °C)   Resp 24   Ht 5' 4\" (1.626 m)   Wt (!) 199.6 kg (440 lb)   SpO2 92%   BMI 75.53 kg/m²      Tmax/24hrs: Temp (24hrs), Av.2 °F (36.8 °C), Min:97.8 °F (36.6 °C), Max:98.5 °F (36.9 °C)  IOBRIEFNo intake or output data in the 24 hours ending 22 1400    General:  Alert, cooperative, no acute distress    HEENT: PERRLA, anicteric sclerae. Pulmonary: Decreased breath sounds at bases  Cardiovascular: Regular rate and Rhythm. GI:  Soft, Non distended, Non tender. + Bowel sounds. Extremities:  No edema, cyanosis, clubbing. No calf tenderness. Neurologic: Alert and oriented X 3.  No acute neuro deficits. Additional:    Medications:   Current Facility-Administered Medications   Medication Dose Route Frequency    [START ON 2/2/2022] insulin glargine (LANTUS) injection 20 Units  0.1 Units/kg SubCUTAneous DAILY    insulin lispro (HUMALOG) injection 10 Units  0.05 Units/kg SubCUTAneous TIDAC    ipratropium-albuterol (COMBIVENT RESPIMAT) 20 mcg-100 mcg inhalation spray  1 Puff Inhalation Q4H RT    budesonide-formoteroL (SYMBICORT) 160-4.5 mcg/actuation HFA inhaler 2 Puff  2 Puff Inhalation BID RT    sodium chloride (NS) flush 5-10 mL  5-10 mL IntraVENous PRN    azithromycin (ZITHROMAX) 500 mg in 0.9% sodium chloride 250 mL (VIAL-MATE)  500 mg IntraVENous Q24H    insulin lispro (HUMALOG) injection   SubCUTAneous AC&HS    glucose chewable tablet 16 g  16 g Oral PRN    glucagon (GLUCAGEN) injection 1 mg  1 mg IntraMUSCular PRN    dextrose 10% infusion 125-250 mL  125-250 mL IntraVENous PRN    dexamethasone (DECADRON) 4 mg/mL injection 10 mg  10 mg IntraVENous Q12H    acetaminophen (TYLENOL) tablet 500 mg  500 mg Oral Q6H PRN    ondansetron (ZOFRAN) injection 4 mg  4 mg IntraVENous Q4H PRN    DULoxetine (CYMBALTA) capsule 30 mg  30 mg Oral DAILY    gabapentin (NEURONTIN) capsule 100 mg  100 mg Oral TID    omega-3 acid ethyl esters (LOVAZA) capsule 1,000 mg  1 g Oral BID WITH MEALS    enoxaparin (LOVENOX) injection 40 mg  40 mg SubCUTAneous Q24H    hydrALAZINE (APRESOLINE) tablet 25 mg  25 mg Oral TID PRN     Current Outpatient Medications   Medication Sig    acetaminophen (TYLENOL) 325 mg tablet Take 2 Tabs by mouth every four (4) hours as needed for Pain. (Patient not taking: Reported on 6/2/2021)    LORazepam (ATIVAN) 1 mg tablet Take 0.5 Tabs by mouth every eight (8) hours as needed for Anxiety. Max Daily Amount: 1.5 mg. (Patient not taking: Reported on 6/2/2021)    ibuprofen (MOTRIN) 800 mg tablet Take 1 Tab by mouth every eight (8) hours as needed for Pain.        Imaging:   XR Results (most recent):  Results from [place] encounter on 01/30/22    XR CHEST PORT    Narrative  EXAM: XR CHEST PORT    INDICATION: 26 years Female. cough. ADDITIONAL HISTORY: None. TECHNIQUE: Frontal view of the chest.    COMPARISON: 7/19/2021    FINDINGS:    Underpenetration of the lung bases. Obscuration of the left heart border and  left hemidiaphragm. The cardiac silhouette appears similar to prior. Dense opacity is at the left  lung base, blunting of the left costophrenic sulcus. Hazy opacity at the right  lung base. Diffuse bilateral interstitial opacities within the left greater than  right lungs. Additional airspace opacity at the peripheral aspect of the left  mid/upper lung zone. The right costophrenic sulcus is sharp. No definite pneumothorax. No acute osseous abnormality appreciated. Impression  1. Multifocal patchy airspace opacities involving the left greater than right  lung bases as well as the left midlung zone. Background of diffuse bilateral  interstitial opacities. Findings are most suspicious for multifocal pneumonia,  possible Covid 19 pneumonia; although other infectious or inflammatory process  is possible. Recommend radiographic follow-up within 6-8 weeks after appropriate  medical therapy and imaging follow-up until clearance. 2.  Possible left pleural effusion. CT Results (most recent):  Results from [place] encounter on 01/30/22    CTA CHEST W OR W WO CONT    Narrative  CTA CHEST PULMONARY EMBOLISM PROTOCOL    INDICATION: Morbid obesity, asthma with shortness of breath, wheezing, diarrhea,  cough, headache, myalgias, decreased appetite, loss of taste and smell, nausea,  vomiting, question of Covid. Question pulmonary embolism.     TECHNIQUE: Thin collimation axial images obtained through the level of the  pulmonary arteries with additional imaging through the chest following the  uneventful administration of intravenous contrast.  Images reconstructed into  three dimensional coronal and sagittal projections for complete evaluation of  the tortuous and overlapping pulmonary vascular structures and to reduce patient  radiation dose. All CT scans at this facility are performed using dose optimization technique as  appropriate to a performed exam, to include automated exposure control,  adjustment of the mA and/or kV according to patient size (including appropriate  matching first site-specific examinations), or use of iterative reconstruction  technique. COMPARISON: 2/1/2021    FINDINGS: Evaluation limited by body habitus. There is suboptimal contrast density to assess for chronic filling defects and  respiratory motion also limits assessment. No visible pulmonary emboli, though  many of the segments are nondiagnostic. Thyroid: Unremarkable in its visualized aspects. Pericardium/ Heart: No significant effusion. Aorta/ Vessels: No aneurysm or dissection. Lymph Nodes: 1.1 cm left periaortic node. 1.2 cm subcarinal node. 1.4 cm right  hilar node. .    Lungs: There are bilateral dense lung consolidations, left greater than right. The majority of the left lung is affected. Large consolidation in the right  middle lobe. Pleura: Trace bilateral pleural effusions. No pneumothorax. Upper Abdomen: Hepatic steatosis. The spleen is not completely included in the  field-of-view but may be enlarged. Bones/soft tissues: Degenerative disc disease. .    Impression  1. No visible acute pulmonary emboli, though several of the segments are  nondiagnostic secondary to respiratory motion. 2.  Multifocal pneumonia which may be bacterial or viral. If viral, is more  advanced given the dense consolidations. 3.  Trace bilateral pleural effusions which are atypical in Covid 19.  4.  Mild lymphadenopathy, likely reactive, also atypical in Covid 19.  5.  Hepatic steatosis. 6.  Incompletely visualized but potential splenomegaly.            Labs:    Recent Results (from the past 48 hour(s))   CULTURE, BLOOD    Collection Time: 01/30/22  4:00 PM    Specimen: Blood   Result Value Ref Range    Special Requests: [facility]     GRAM STAIN ANAEROBIC BOTTLE GRAM POSITIVE COCCI IN GROUPS      GRAM STAIN        SMEAR CALLED TO AND CORRECTLY REPEATED BY: DR RUBEN VALVERDE ER ON [id number] AT 2117 HRS TO 1396. Culture result: (A)       GRAM POSITIVE COCCI IN CLUSTERS GROWING IN THE ANAEROBIC BOTTLE , SITE = [facility]   PROTHROMBIN TIME + INR    Collection Time: 01/30/22  4:00 PM   Result Value Ref Range    Prothrombin time 14.1 11.5 - 15.2 sec    INR 1.1 0.8 - 1.2     PTT    Collection Time: 01/30/22  4:00 PM   Result Value Ref Range    aPTT 37.3 (H) 23.0 - 36.4 SEC   CULTURE, BLOOD    Collection Time: 01/30/22  4:15 PM    Specimen: Blood   Result Value Ref Range    Special Requests: [facility]     Culture result: NO GROWTH 2 DAYS     POC LACTIC ACID    Collection Time: 01/30/22  4:20 PM   Result Value Ref Range    Lactic Acid (POC) 1.37 0.40 - 2.00 mmol/L   GLUCOSE, POC    Collection Time: 01/30/22  4:43 PM   Result Value Ref Range    Glucose (POC) 299 (H) 70 - 110 mg/dL   MAGNESIUM    Collection Time: 01/30/22  5:09 PM   Result Value Ref Range    Magnesium 1.7 1.6 - 2.6 mg/dL   METABOLIC PANEL, COMPREHENSIVE    Collection Time: 01/30/22  5:09 PM   Result Value Ref Range    Sodium 134 (L) 136 - 145 mmol/L    Potassium 3.8 3.5 - 5.5 mmol/L    Chloride 98 (L) 100 - 111 mmol/L    CO2 29 21 - 32 mmol/L    Anion gap 7 3.0 - 18 mmol/L    Glucose 304 (H) 74 - 99 mg/dL    BUN 9 7.0 - 18 MG/DL    Creatinine 0.80 0.6 - 1.3 MG/DL    BUN/Creatinine ratio 11 (L) 12 - 20      GFR est AA >60 >60 ml/min/1.73m2    GFR est non-AA >60 >60 ml/min/1.73m2    Calcium 7.3 (L) 8.5 - 10.1 MG/DL    Bilirubin, total 0.5 0.2 - 1.0 MG/DL    ALT (SGPT) 101 (H) 13 - 56 U/L    AST (SGOT) 103 (H) 10 - 38 U/L    Alk.  phosphatase 55 45 - 117 U/L    Protein, total 6.6 6.4 - 8.2 g/dL    Albumin 2.6 (L) 3.4 - 5.0 g/dL    Globulin 4.0 2.0 - 4.0 g/dL    A-G Ratio 0.7 (L) 0.8 - 1.7     C REACTIVE PROTEIN, QT    Collection Time: 01/30/22  5:09 PM   Result Value Ref Range    C-Reactive protein 8.8 (H) 0 - 0.3 mg/dL   FERRITIN    Collection Time: 01/30/22  5:09 PM   Result Value Ref Range    Ferritin 360 8 - 388 NG/ML   D DIMER    Collection Time: 01/30/22  5:09 PM   Result Value Ref Range    D DIMER 1.07 (H) <0.46 ug/ml(FEU)   LD    Collection Time: 01/30/22  5:09 PM   Result Value Ref Range     (H) 81 - 234 U/L   NT-PRO BNP    Collection Time: 01/30/22  5:09 PM   Result Value Ref Range    NT pro- 0 - 450 PG/ML   PROCALCITONIN    Collection Time: 01/30/22  5:09 PM   Result Value Ref Range    Procalcitonin 0.08 ng/mL   RESPIRATORY VIRUS PANEL W/COVID-19, PCR    Collection Time: 01/30/22  8:10 PM    Specimen: Nasopharyngeal   Result Value Ref Range    Adenovirus Not detected NOTD      Coronavirus 229E Not detected NOTD      Coronavirus HKU1 Not detected NOTD      Coronavirus CVNL63 Not detected NOTD      Coronavirus OC43 Not detected NOTD      SARS-CoV-2, PCR Detected (AA) NOTD      Metapneumovirus Not detected NOTD      Rhinovirus and Enterovirus Not detected NOTD      Influenza A Not detected NOTD      Influenza A, subtype H1 Not detected NOTD      Influenza A, subtype H3 Not detected NOTD      INFLUENZA A H1N1 PCR Not detected NOTD      Influenza B Not detected NOTD      Parainfluenza 1 Not detected NOTD      Parainfluenza 2 Not detected NOTD      Parainfluenza 3 Not detected NOTD      Parainfluenza virus 4 Not detected NOTD      RSV by PCR Not detected NOTD      B. parapertussis, PCR Not detected NOTD      Bordetella pertussis - PCR Not detected NOTD      Chlamydophila pneumoniae DNA, QL, PCR Not detected NOTD      Mycoplasma pneumoniae DNA, QL, PCR Not detected NOTD     EKG, 12 LEAD, INITIAL    Collection Time: 01/30/22  8:15 PM   Result Value Ref Range    Ventricular Rate 101 BPM    Atrial Rate 101 BPM    P-R Interval 146 ms    QRS Duration 96 ms    Q-T Interval 356 ms    QTC Calculation (Bezet) 461 ms    Calculated P Axis 59 degrees    Calculated R Axis 62 degrees    Calculated T Axis 43 degrees    Diagnosis       Sinus tachycardia  Otherwise normal ECG  When compared with ECG of 19-JUL-2021 00:33,  T wave inversion no longer evident in Inferior leads  Confirmed by Cynthia Glez [id number]) on 1/31/2022 7:56:16 AM     GLUCOSE, POC    Collection Time: 01/30/22 10:51 PM   Result Value Ref Range    Glucose (POC) 396 (H) 70 - 110 mg/dL   MAGNESIUM    Collection Time: 01/31/22  3:13 AM   Result Value Ref Range    Magnesium 2.1 1.6 - 2.6 mg/dL   PROCALCITONIN    Collection Time: 01/31/22  3:13 AM   Result Value Ref Range    Procalcitonin <7.32 ng/mL   METABOLIC PANEL, COMPREHENSIVE    Collection Time: 01/31/22  3:13 AM   Result Value Ref Range    Sodium 134 (L) 136 - 145 mmol/L    Potassium 3.8 3.5 - 5.5 mmol/L    Chloride 98 (L) 100 - 111 mmol/L    CO2 28 21 - 32 mmol/L    Anion gap 8 3.0 - 18 mmol/L    Glucose 375 (H) 74 - 99 mg/dL    BUN 13 7.0 - 18 MG/DL    Creatinine 0.83 0.6 - 1.3 MG/DL    BUN/Creatinine ratio 16 12 - 20      GFR est AA >60 >60 ml/min/1.73m2    GFR est non-AA >60 >60 ml/min/1.73m2    Calcium 8.0 (L) 8.5 - 10.1 MG/DL    Bilirubin, total 1.0 0.2 - 1.0 MG/DL    ALT (SGPT) 109 (H) 13 - 56 U/L    AST (SGOT) 88 (H) 10 - 38 U/L    Alk.  phosphatase 63 45 - 117 U/L    Protein, total 7.1 6.4 - 8.2 g/dL    Albumin 3.0 (L) 3.4 - 5.0 g/dL    Globulin 4.1 (H) 2.0 - 4.0 g/dL    A-G Ratio 0.7 (L) 0.8 - 1.7     C REACTIVE PROTEIN, QT    Collection Time: 01/31/22  3:13 AM   Result Value Ref Range    C-Reactive protein 10.3 (H) 0 - 0.3 mg/dL   D DIMER    Collection Time: 01/31/22  3:13 AM   Result Value Ref Range    D DIMER 0.86 (H) <0.46 ug/ml(FEU)   CBC WITH AUTOMATED DIFF    Collection Time: 01/31/22  3:13 AM   Result Value Ref Range    WBC 5.8 4.6 - 13.2 K/uL    RBC 4.71 4.20 - 5.30 M/uL    HGB 13.1 12.0 - 16.0 g/dL    HCT 42.6 35.0 - 45.0 % MCV 90.4 78.0 - 100.0 FL    MCH 27.8 24.0 - 34.0 PG    MCHC 30.8 (L) 31.0 - 37.0 g/dL    RDW 14.2 11.6 - 14.5 %    PLATELET 589 079 - 523 K/uL    MPV 9.9 9.2 - 11.8 FL    NRBC 0.0 0  WBC    ABSOLUTE NRBC 0.00 0.00 - 0.01 K/uL    NEUTROPHILS 80 (H) 40 - 73 %    LYMPHOCYTES 15 (L) 21 - 52 %    MONOCYTES 4 3 - 10 %    EOSINOPHILS 0 0 - 5 %    BASOPHILS 0 0 - 2 %    IMMATURE GRANULOCYTES 1 (H) 0.0 - 0.5 %    ABS. NEUTROPHILS 4.6 1.8 - 8.0 K/UL    ABS. LYMPHOCYTES 0.9 0.9 - 3.6 K/UL    ABS. MONOCYTES 0.2 0.05 - 1.2 K/UL    ABS. EOSINOPHILS 0.0 0.0 - 0.4 K/UL    ABS. BASOPHILS 0.0 0.0 - 0.1 K/UL    ABS. IMM.  GRANS. 0.1 (H) 0.00 - 0.04 K/UL    DF AUTOMATED     HEMOGLOBIN A1C WITH EAG    Collection Time: 01/31/22  3:13 AM   Result Value Ref Range    Hemoglobin A1c 12.0 (H) 4.2 - 5.6 %    Est. average glucose 298 mg/dL   GLUCOSE, POC    Collection Time: 01/31/22  8:16 AM   Result Value Ref Range    Glucose (POC) 334 (H) 70 - 110 mg/dL   GLUCOSE, POC    Collection Time: 01/31/22 12:50 PM   Result Value Ref Range    Glucose (POC) 344 (H) 70 - 110 mg/dL   GLUCOSE, POC    Collection Time: 01/31/22  6:08 PM   Result Value Ref Range    Glucose (POC) 307 (H) 70 - 110 mg/dL   GLUCOSE, POC    Collection Time: 01/31/22 11:33 PM   Result Value Ref Range    Glucose (POC) 329 (H) 70 - 110 mg/dL   MAGNESIUM    Collection Time: 02/01/22  4:06 AM   Result Value Ref Range    Magnesium 2.7 (H) 1.6 - 2.6 mg/dL   METABOLIC PANEL, COMPREHENSIVE    Collection Time: 02/01/22  4:06 AM   Result Value Ref Range    Sodium 136 136 - 145 mmol/L    Potassium 3.9 3.5 - 5.5 mmol/L    Chloride 99 (L) 100 - 111 mmol/L    CO2 28 21 - 32 mmol/L    Anion gap 9 3.0 - 18 mmol/L    Glucose 327 (H) 74 - 99 mg/dL    BUN 15 7.0 - 18 MG/DL    Creatinine 0.67 0.6 - 1.3 MG/DL    BUN/Creatinine ratio 22 (H) 12 - 20      GFR est AA >60 >60 ml/min/1.73m2    GFR est non-AA >60 >60 ml/min/1.73m2    Calcium 8.7 8.5 - 10.1 MG/DL    Bilirubin, total 0.6 0.2 - 1.0 MG/DL ALT (SGPT) 94 (H) 13 - 56 U/L    AST (SGOT) 62 (H) 10 - 38 U/L    Alk. phosphatase 62 45 - 117 U/L    Protein, total 7.1 6.4 - 8.2 g/dL    Albumin 2.9 (L) 3.4 - 5.0 g/dL    Globulin 4.2 (H) 2.0 - 4.0 g/dL    A-G Ratio 0.7 (L) 0.8 - 1.7     C REACTIVE PROTEIN, QT    Collection Time: 02/01/22  4:06 AM   Result Value Ref Range    C-Reactive protein 5.2 (H) 0 - 0.3 mg/dL   PROCALCITONIN    Collection Time: 02/01/22  4:06 AM   Result Value Ref Range    Procalcitonin <0.05 ng/mL   D DIMER    Collection Time: 02/01/22  4:06 AM   Result Value Ref Range    D DIMER 0.49 (H) <0.46 ug/ml(FEU)   GLUCOSE, POC    Collection Time: 02/01/22  9:30 AM   Result Value Ref Range    Glucose (POC) 317 (H) 70 - 110 mg/dL   GLUCOSE, POC    Collection Time: 02/01/22  1:24 PM   Result Value Ref Range    Glucose (POC) 302 (H) 70 - 110 mg/dL       Signed By: Ambrosio Mcknight MD     February 1, 2022      I spent 25 minutes with the patient in face-to-face consultation, of which greater than 50% was spent in counseling and coordination of care as described above    Disclaimer: Sections of this note are dictated using utilizing voice recognition software. Minor typographical errors may be present. If questions arise, please do not hesitate to contact me or call our department.

## 2022-02-01 NOTE — PROGRESS NOTES
Infectious Disease progress Note        Reason: COVID-19 pneumonia, hypoxic respiratory failure    Current abx Prior abx   Ceftriaxone, azithromycin since 1/30/2022      Lines:       Assessment :    23 y.o.  female with h/o uncontrolled type 2 DM, obesity presented to the emergency room on 1/30/22 with complaint of worsening shortness of breath, cough and hemoptysis    Unvaccinated for COVID-19    Clinical presentation consistent with acute hypoxic respiratory failure-present on admission due to confirmed COVID-19 pneumonia    Status post high-dose Decadron since 1/30/2022    Persistent high oxygen requirement, increasing CRP, unvaccinated status, obesity, uncontrolled type 2 diabetes-high risk for clinical deterioration. S/p tocilizumab on 1/31/22    Recommendations:    1. Continue Decadron 10 mg IV every 12 hours till 2/3 followed by decadron 10 mg IV q 24 hour till 2/8/22  2. Follow-up pulmonary recommendations  3. Discontinue azithromycin after today's dose  4. Monitor CRP, D-dimer, procalcitonin  5. Titrate oxygen as tolerated    We will sign off. Please call if any new questions or concerns. Thanks     Above plan was discussed in details with patient, RN, primary team.    HPI:    Continues to feel short of breath. Denies any headaches or dizziness currently. No syncope. No soreness of throat. No problems swallowing. Denies any tingling or numbness. No chest pain or abdominal pain. No nausea or vomiting. No past medical history on file. Past Surgical History:   Procedure Laterality Date    HX APPENDECTOMY         Patient's Medications   Start Taking    No medications on file   Continue Taking    ACETAMINOPHEN (TYLENOL) 325 MG TABLET    Take 2 Tabs by mouth every four (4) hours as needed for Pain. IBUPROFEN (MOTRIN) 800 MG TABLET    Take 1 Tab by mouth every eight (8) hours as needed for Pain.     LORAZEPAM (ATIVAN) 1 MG TABLET    Take 0.5 Tabs by mouth every eight (8) hours as needed for Anxiety. Max Daily Amount: 1.5 mg. These Medications have changed    No medications on file   Stop Taking    CYCLOBENZAPRINE (FLEXERIL) 10 MG TABLET    Take 1 Tab by mouth three (3) times daily as needed for Muscle Spasm(s). DIAZEPAM (VALIUM) 5 MG TABLET    Take 1 Tab by mouth three (3) times daily as needed for Anxiety (spasm). Max Daily Amount: 15 mg. KETOROLAC (TORADOL) 10 MG TABLET    Take 1 Tab by mouth every six (6) hours as needed for Pain.        Current Facility-Administered Medications   Medication Dose Route Frequency    [START ON 2/2/2022] insulin glargine (LANTUS) injection 20 Units  0.1 Units/kg SubCUTAneous DAILY    insulin lispro (HUMALOG) injection 10 Units  0.05 Units/kg SubCUTAneous TIDAC    ipratropium-albuterol (COMBIVENT RESPIMAT) 20 mcg-100 mcg inhalation spray  1 Puff Inhalation Q4H RT    budesonide-formoteroL (SYMBICORT) 160-4.5 mcg/actuation HFA inhaler 2 Puff  2 Puff Inhalation BID RT    sodium chloride (NS) flush 5-10 mL  5-10 mL IntraVENous PRN    azithromycin (ZITHROMAX) 500 mg in 0.9% sodium chloride 250 mL (VIAL-MATE)  500 mg IntraVENous Q24H    insulin lispro (HUMALOG) injection   SubCUTAneous AC&HS    glucose chewable tablet 16 g  16 g Oral PRN    glucagon (GLUCAGEN) injection 1 mg  1 mg IntraMUSCular PRN    dextrose 10% infusion 125-250 mL  125-250 mL IntraVENous PRN    dexamethasone (DECADRON) 4 mg/mL injection 10 mg  10 mg IntraVENous Q12H    acetaminophen (TYLENOL) tablet 500 mg  500 mg Oral Q6H PRN    ondansetron (ZOFRAN) injection 4 mg  4 mg IntraVENous Q4H PRN    DULoxetine (CYMBALTA) capsule 30 mg  30 mg Oral DAILY    gabapentin (NEURONTIN) capsule 100 mg  100 mg Oral TID    omega-3 acid ethyl esters (LOVAZA) capsule 1,000 mg  1 g Oral BID WITH MEALS    enoxaparin (LOVENOX) injection 40 mg  40 mg SubCUTAneous Q24H    hydrALAZINE (APRESOLINE) tablet 25 mg  25 mg Oral TID PRN     Current Outpatient Medications   Medication Sig    acetaminophen (TYLENOL) 325 mg tablet Take 2 Tabs by mouth every four (4) hours as needed for Pain. (Patient not taking: Reported on 6/2/2021)    LORazepam (ATIVAN) 1 mg tablet Take 0.5 Tabs by mouth every eight (8) hours as needed for Anxiety. Max Daily Amount: 1.5 mg. (Patient not taking: Reported on 6/2/2021)    ibuprofen (MOTRIN) 800 mg tablet Take 1 Tab by mouth every eight (8) hours as needed for Pain. Allergies: Patient has no known allergies. No family history on file. Social History     Socioeconomic History    Marital status:      Spouse name: Not on file    Number of children: Not on file    Years of education: Not on file    Highest education level: Not on file   Occupational History    Not on file   Tobacco Use    Smoking status: Current Every Day Smoker    Smokeless tobacco: Never Used   Substance and Sexual Activity    Alcohol use: Not Currently    Drug use: Not Currently    Sexual activity: Not on file   Other Topics Concern    Not on file   Social History Narrative    Not on file     Social Determinants of Health     Financial Resource Strain:     Difficulty of Paying Living Expenses: Not on file   Food Insecurity:     Worried About Running Out of Food in the Last Year: Not on file    Tamar of Food in the Last Year: Not on file   Transportation Needs:     Lack of Transportation (Medical): Not on file    Lack of Transportation (Non-Medical):  Not on file   Physical Activity:     Days of Exercise per Week: Not on file    Minutes of Exercise per Session: Not on file   Stress:     Feeling of Stress : Not on file   Social Connections:     Frequency of Communication with Friends and Family: Not on file    Frequency of Social Gatherings with Friends and Family: Not on file    Attends Orthodoxy Services: Not on file    Active Member of Clubs or Organizations: Not on file    Attends Club or Organization Meetings: Not on file    Marital Status: Not on file Intimate Partner Violence:     Fear of Current or Ex-Partner: Not on file    Emotionally Abused: Not on file    Physically Abused: Not on file    Sexually Abused: Not on file   Housing Stability:     Unable to Pay for Housing in the Last Year: Not on file    Number of [place] in the Last Year: Not on file    Unstable Housing in the Last Year: Not on file     Social History     Tobacco Use   Smoking Status Current Every Day Smoker   Smokeless Tobacco Never Used        Temp (24hrs), Av.5 °F (36.9 °C), Min:98.5 °F (36.9 °C), Max:98.5 °F (36.9 °C)    Visit Vitals  BP (!) 146/70   Pulse 62   Temp 98.5 °F (36.9 °C)   Resp 24   Ht 5' 4\" (1.626 m)   Wt (!) 199.6 kg (440 lb)   SpO2 92%   BMI 75.53 kg/m²       ROS: 12 point ROS obtained in details. Pertinent positives as mentioned in HPI,   otherwise negative    Physical Exam:    Vitals signs and nursing note reviewed. Constitutional:       General: He is not in acute distress. Appearance: He is well-developed. HENT:      Head: Normocephalic. Eyes:      Conjunctiva/sclera: Conjunctivae normal.      Neck:      Musculoskeletal: Normal range of motion and neck supple. Cardiovascular:      Rate and Rhythm: Normal rate and regular rhythm on monitor  Chest:      Bilateral chest movements equal.  Auscultation deferred due to Covid positive  Abdominal:      General: There is no distension. Palpations: Abdomen is soft. Tenderness: There is no abdominal tenderness. There is no rebound. Musculoskeletal: Normal range of motion. General: No tenderness. Skin:     General: Skin is warm and dry. Findings: No rash. Neurological:      Mental Status:  alert and oriented to person, place, and time. Cranial Nerves: No cranial nerve deficit. Motor: No abnormal muscle tone. Coordination: Coordination normal.   Psychiatric:         Behavior: Behavior normal.         Thought Content:  Thought content normal.         Judgment: Judgment normal.     Labs: Results:   Chemistry Recent Labs     02/01/22  0406 01/31/22  0313 01/30/22  1709   * 375* 304*    134* 134*   K 3.9 3.8 3.8   CL 99* 98* 98*   CO2 28 28 29   BUN 15 13 9   CREA 0.67 0.83 0.80   CA 8.7 8.0* 7.3*   AGAP 9 8 7   BUCR 22* 16 11*   AP 62 63 55   TP 7.1 7.1 6.6   ALB 2.9* 3.0* 2.6*   GLOB 4.2* 4.1* 4.0   AGRAT 0.7* 0.7* 0.7*      CBC w/Diff Recent Labs     01/31/22  0313 01/30/22  1318   WBC 5.8 5.1   RBC 4.71 4.82   HGB 13.1 13.5   HCT 42.6 43.7    185   GRANS 80* 71   LYMPH 15* 24   EOS 0 0      Microbiology Recent Labs     01/30/22  1615 01/30/22  1600   CULT NO GROWTH 2 DAYS GRAM POSITIVE COCCI IN CLUSTERS GROWING IN THE ANAEROBIC BOTTLE , SITE = [facility]*          RADIOLOGY:    All available imaging studies/reports in [facility] for this admission were reviewed      Disclaimer: Sections of this note are dictated utilizing voice recognition software, which may have resulted in some phonetic based errors in grammar and contents. Even though attempts were made to correct all the mistakes, some may have been missed, and remained in the body of the document. If questions arise, please contact our department.     Dr. Anastacio Forrester, Infectious Disease Specialist  [phone]  February 1, 2022  3:16 PM

## 2022-02-01 NOTE — ED NOTES
Assume care of patient, patient alert and oriented x 4, skin warm and dry, patient on monitor SR noted at this time, explained to patient that she has new onset diabeties and that she is on a restricted diet at this time and will get insulin for treatment, patient voices understanding.   Purposeful rounding completed:    Side rails up x 2:  YES  Bed in low position and wheels locked: YES  Call bell within reach: YES  Comfort addressed: YES    Toileting needs addressed: YES  Plan of care reviewed/updated with patient and or family members: YES  IV site assessed: YES  Pain assessed and addressed: YES, 0

## 2022-02-01 NOTE — ED NOTES
Patient sitting up in bed watching TV. A man who identified himself as her  called and was offering advice to the hospital staff on how to treat Covid per his Internet research. Patient in good spirits, denies any needs at this time.

## 2022-02-02 LAB
ALBUMIN SERPL-MCNC: 2.8 G/DL (ref 3.4–5)
ALBUMIN/GLOB SERPL: 0.7 {RATIO} (ref 0.8–1.7)
ALP SERPL-CCNC: 59 U/L (ref 45–117)
ALT SERPL-CCNC: 95 U/L (ref 13–56)
ANION GAP SERPL CALC-SCNC: 4 MMOL/L (ref 3–18)
AST SERPL-CCNC: 67 U/L (ref 10–38)
BILIRUB SERPL-MCNC: 0.6 MG/DL (ref 0.2–1)
BUN SERPL-MCNC: 17 MG/DL (ref 7–18)
BUN/CREAT SERPL: 24 (ref 12–20)
CALCIUM SERPL-MCNC: 8.5 MG/DL (ref 8.5–10.1)
CHLORIDE SERPL-SCNC: 99 MMOL/L (ref 100–111)
CO2 SERPL-SCNC: 30 MMOL/L (ref 21–32)
CREAT SERPL-MCNC: 0.71 MG/DL (ref 0.6–1.3)
CRP SERPL-MCNC: 2.3 MG/DL (ref 0–0.3)
D DIMER PPP FEU-MCNC: 0.48 UG/ML(FEU)
GLOBULIN SER CALC-MCNC: 4 G/DL (ref 2–4)
GLUCOSE BLD STRIP.AUTO-MCNC: 313 MG/DL (ref 70–110)
GLUCOSE BLD STRIP.AUTO-MCNC: 333 MG/DL (ref 70–110)
GLUCOSE BLD STRIP.AUTO-MCNC: 358 MG/DL (ref 70–110)
GLUCOSE BLD STRIP.AUTO-MCNC: 373 MG/DL (ref 70–110)
GLUCOSE BLD STRIP.AUTO-MCNC: 389 MG/DL (ref 70–110)
GLUCOSE SERPL-MCNC: 369 MG/DL (ref 74–99)
MAGNESIUM SERPL-MCNC: 2.3 MG/DL (ref 1.6–2.6)
POTASSIUM SERPL-SCNC: 4 MMOL/L (ref 3.5–5.5)
PROCALCITONIN SERPL-MCNC: <0.05 NG/ML
PROT SERPL-MCNC: 6.8 G/DL (ref 6.4–8.2)
SODIUM SERPL-SCNC: 133 MMOL/L (ref 136–145)

## 2022-02-02 PROCEDURE — 36415 COLL VENOUS BLD VENIPUNCTURE: CPT

## 2022-02-02 PROCEDURE — 94640 AIRWAY INHALATION TREATMENT: CPT

## 2022-02-02 PROCEDURE — 80053 COMPREHEN METABOLIC PANEL: CPT

## 2022-02-02 PROCEDURE — 77030027138 HC INCENT SPIROMETER -A

## 2022-02-02 PROCEDURE — 74011636637 HC RX REV CODE- 636/637: Performed by: HOSPITALIST

## 2022-02-02 PROCEDURE — 94760 N-INVAS EAR/PLS OXIMETRY 1: CPT

## 2022-02-02 PROCEDURE — 84145 PROCALCITONIN (PCT): CPT

## 2022-02-02 PROCEDURE — 83735 ASSAY OF MAGNESIUM: CPT

## 2022-02-02 PROCEDURE — 86140 C-REACTIVE PROTEIN: CPT

## 2022-02-02 PROCEDURE — 85379 FIBRIN DEGRADATION QUANT: CPT

## 2022-02-02 PROCEDURE — 2709999900 HC NON-CHARGEABLE SUPPLY

## 2022-02-02 PROCEDURE — 74011250636 HC RX REV CODE- 250/636: Performed by: HOSPITALIST

## 2022-02-02 PROCEDURE — 99233 SBSQ HOSP IP/OBS HIGH 50: CPT | Performed by: INTERNAL MEDICINE

## 2022-02-02 PROCEDURE — 74011250636 HC RX REV CODE- 250/636: Performed by: INTERNAL MEDICINE

## 2022-02-02 PROCEDURE — 65660000000 HC RM CCU STEPDOWN

## 2022-02-02 PROCEDURE — 82962 GLUCOSE BLOOD TEST: CPT

## 2022-02-02 PROCEDURE — 74011250637 HC RX REV CODE- 250/637: Performed by: INTERNAL MEDICINE

## 2022-02-02 PROCEDURE — 77010033678 HC OXYGEN DAILY

## 2022-02-02 PROCEDURE — 99232 SBSQ HOSP IP/OBS MODERATE 35: CPT | Performed by: HOSPITALIST

## 2022-02-02 PROCEDURE — 74011000250 HC RX REV CODE- 250: Performed by: PHYSICIAN ASSISTANT

## 2022-02-02 RX ORDER — INSULIN GLARGINE 100 [IU]/ML
30 INJECTION, SOLUTION SUBCUTANEOUS EVERY 12 HOURS
Status: DISCONTINUED | OUTPATIENT
Start: 2022-02-02 | End: 2022-02-03

## 2022-02-02 RX ORDER — INSULIN GLARGINE 100 [IU]/ML
30 INJECTION, SOLUTION SUBCUTANEOUS DAILY
Status: DISCONTINUED | OUTPATIENT
Start: 2022-02-02 | End: 2022-02-02

## 2022-02-02 RX ORDER — GABAPENTIN 100 MG/1
100 CAPSULE ORAL 3 TIMES DAILY
COMMUNITY

## 2022-02-02 RX ORDER — INSULIN LISPRO 100 [IU]/ML
12 INJECTION, SOLUTION INTRAVENOUS; SUBCUTANEOUS
Status: DISCONTINUED | OUTPATIENT
Start: 2022-02-02 | End: 2022-02-08 | Stop reason: HOSPADM

## 2022-02-02 RX ORDER — DULOXETIN HYDROCHLORIDE 60 MG/1
60 CAPSULE, DELAYED RELEASE ORAL DAILY
COMMUNITY

## 2022-02-02 RX ORDER — ENOXAPARIN SODIUM 100 MG/ML
40 INJECTION SUBCUTANEOUS EVERY 12 HOURS
Status: DISPENSED | OUTPATIENT
Start: 2022-02-02 | End: 2022-02-06

## 2022-02-02 RX ADMIN — INSULIN LISPRO 12 UNITS: 100 INJECTION, SOLUTION INTRAVENOUS; SUBCUTANEOUS at 11:39

## 2022-02-02 RX ADMIN — DEXAMETHASONE SODIUM PHOSPHATE 10 MG: 4 INJECTION, SOLUTION INTRAMUSCULAR; INTRAVENOUS at 23:54

## 2022-02-02 RX ADMIN — Medication 10 UNITS: at 07:52

## 2022-02-02 RX ADMIN — IPRATROPIUM BROMIDE AND ALBUTEROL 1 PUFF: 20; 100 SPRAY, METERED RESPIRATORY (INHALATION) at 12:00

## 2022-02-02 RX ADMIN — IPRATROPIUM BROMIDE AND ALBUTEROL 1 PUFF: 20; 100 SPRAY, METERED RESPIRATORY (INHALATION) at 08:00

## 2022-02-02 RX ADMIN — IPRATROPIUM BROMIDE AND ALBUTEROL 1 PUFF: 20; 100 SPRAY, METERED RESPIRATORY (INHALATION) at 20:00

## 2022-02-02 RX ADMIN — SODIUM CHLORIDE, PRESERVATIVE FREE 10 ML: 5 INJECTION INTRAVENOUS at 21:49

## 2022-02-02 RX ADMIN — IPRATROPIUM BROMIDE AND ALBUTEROL 1 PUFF: 20; 100 SPRAY, METERED RESPIRATORY (INHALATION) at 16:00

## 2022-02-02 RX ADMIN — BUDESONIDE AND FORMOTEROL FUMARATE DIHYDRATE 2 PUFF: 160; 4.5 AEROSOL RESPIRATORY (INHALATION) at 08:00

## 2022-02-02 RX ADMIN — OMEGA-3-ACID ETHYL ESTERS 1000 MG: 1 CAPSULE, LIQUID FILLED ORAL at 09:50

## 2022-02-02 RX ADMIN — ENOXAPARIN SODIUM 40 MG: 100 INJECTION SUBCUTANEOUS at 18:54

## 2022-02-02 RX ADMIN — INSULIN GLARGINE 30 UNITS: 100 INJECTION, SOLUTION SUBCUTANEOUS at 21:49

## 2022-02-02 RX ADMIN — BUDESONIDE AND FORMOTEROL FUMARATE DIHYDRATE 2 PUFF: 160; 4.5 AEROSOL RESPIRATORY (INHALATION) at 20:00

## 2022-02-02 RX ADMIN — GABAPENTIN 100 MG: 100 CAPSULE ORAL at 09:50

## 2022-02-02 RX ADMIN — OMEGA-3-ACID ETHYL ESTERS 1000 MG: 1 CAPSULE, LIQUID FILLED ORAL at 18:54

## 2022-02-02 RX ADMIN — Medication 15 UNITS: at 11:37

## 2022-02-02 RX ADMIN — DULOXETINE 30 MG: 30 CAPSULE, DELAYED RELEASE ORAL at 09:50

## 2022-02-02 RX ADMIN — Medication 12 UNITS: at 07:51

## 2022-02-02 RX ADMIN — Medication 15 UNITS: at 21:49

## 2022-02-02 RX ADMIN — INSULIN GLARGINE 30 UNITS: 100 INJECTION, SOLUTION SUBCUTANEOUS at 09:50

## 2022-02-02 RX ADMIN — GABAPENTIN 100 MG: 100 CAPSULE ORAL at 21:49

## 2022-02-02 RX ADMIN — INSULIN LISPRO 12 UNITS: 100 INJECTION, SOLUTION INTRAVENOUS; SUBCUTANEOUS at 15:55

## 2022-02-02 RX ADMIN — DEXAMETHASONE SODIUM PHOSPHATE 10 MG: 4 INJECTION, SOLUTION INTRAMUSCULAR; INTRAVENOUS at 14:03

## 2022-02-02 RX ADMIN — Medication 15 UNITS: at 15:54

## 2022-02-02 NOTE — DIABETES MGMT
Diabetes/ Glycemic Control Plan of Care    Recommendations:  Increase lantus to 30 units daily  Increase mealtime humalog to 12 units - orders obtained    1552 Addendum   BG remain elevated with additional increase in insulins  Recommend adding additional dose of lantus 30 units at night            Assessment:   COVID pneumonia, steroids q 12 hours, new dx T2DM A1c 12%, super morbid obesity  Receiving basal, corrective, mealtime insulin  Increase basal and mealtime  Will continue to monitor for follow up for further DM education    DX:   1. Acute respiratory failure with hypoxia ([facility] [address]     2. Pneumonia due to COVID-19 virus        Fasting/ Morning blood glucose:   Lab Results   Component Value Date/Time    Glucose 369 (H) 02/02/2022 01:02 AM    Glucose (POC) 313 (H) 02/02/2022 07:45 AM     IV Fluids containing dextrose:none  Steroids:   Rx Glucocorticoids (24h ago, onward)             Start     Dose Route Frequency Ordered Stop    02/04/22 0900  dexamethasone (DECADRON) 4 mg/mL injection 10 mg         10 mg IV EVERY 24 HOURS 02/01/22 1607 02/09/22 0859    01/31/22 0000  dexamethasone (DECADRON) 4 mg/mL injection 10 mg         10 mg IV EVERY 12 HOURS 01/30/22 1516 02/04/22 0500           Blood glucose values: Within target range (70-180mg/dL): no    Current insulin orders:   lantus 20 units daily  Corrective humalog, very insulin resistant, 4 times daily  Mealtime humalog 10 units    Total Daily Dose previous 24 hours = 91 units   20 units lantus  51 units corrective  20 units mealtime     Current A1c:   Lab Results   Component Value Date/Time    Hemoglobin A1c 12.0 (H) 01/31/2022 03:13 AM      equivalent  to ave Blood Glucose of 298 mg/dl for 2-3 months prior to admission  Adequate glycemic control PTA: no    Nutrition/Diet:   Active Orders   Diet    ADULT DIET Regular; 4 carb choices (60 gm/meal); No Concentrated Sweets      Meal Intake:  No data found. Supplement Intake:  No data found.     Home diabetes medications:   Key Antihyperglycemic Medications     Patient is on no antihyperglycemic meds. Plan/Goals:   Blood glucose will be within target of 70 - 180 mg/dl within 72 hours  Reinforce dietary and medication compliance at home.        Education:  [x] Refer to Diabetes Education Record                       [] Education not indicated at this time     Radonna Opitz MPH RN [address]  Pager [phone]  Office [phone]

## 2022-02-02 NOTE — PROGRESS NOTES
Received report on pt.from off going RN. Resting quietly in bed on rounds. Denies c/o pain or SOB at this time. 02 on. Call bell at side. bed alarm on. No acute distress noted. Will cont to monitor for any changes in status. 1200 getting up to [facility] independently. Steady gait. Mild MILAN with activity but sts that it is improving. 1610 Pt shown how to pull up insulin from vial and inject self. Did well. Needs reinforcement for continued comfort in performing procedure. 1930 Bedside and Verbal shift change report given to Lior Guerrero RN (oncoming nurse) by Dunia Davenport RN (offgoing nurse). Report given with Rene GONZALEZ and MAR.

## 2022-02-02 NOTE — PROGRESS NOTES
[facility]  [facility]    Patient: Pamela Hendricks Age: 23 y.o. : 1995 MR#: [id number] SSN: [id number]  Date/Time: 2022     Subjective:       Patient seen and examined at bedside, she states cough is still frequent, productive of yellow sputum. She is able to expectorate. Denies sweats or chills. Appetite has been good. No GI symptoms. Assessment/Plan:     1. COVID-19 pneumonia-continue IV Decadron. s/p Tocilizumab. antibiotics completed. Monitor inflammatory markers. ID, pulmonology follows. 2. Acute hypoxic respiratory failure secondary to COVID-19 pneumonia-continue supplemental oxygen on 10 L [place], pulmonary on board. 3. History of neuropathy-continue Neurontin and Cymbalta. 4. Type 2 diabetes-A1c 12, continue Lantus and insulin sliding scale, diabetic education on board. 5. Super morbid obesity  DVT prophylaxis-Lovenox dose increased. Full code    Discussed with  over the phone 7:25 pm.    Discussed on IDT. Indra Pearson MD  23      Case discussed with:  [x]Patient  [x]Family  [x]Nursing  [x]Case Management  DVT Prophylaxis:  [x]Lovenox  []Hep SQ  []SCDs  []Coumadin   []On Heparin gtt    Objective:   VS:   Visit Vitals  BP (!) 141/80 (BP 1 Location: Left upper arm, BP Patient Position: At rest)   Pulse 67   Temp 97.4 °F (36.3 °C)   Resp 18   Ht 5' 4\" (1.626 m)   Wt (!) 199.6 kg (440 lb)   SpO2 91%   Breastfeeding No   BMI 75.53 kg/m²      Tmax/24hrs: Temp (24hrs), Av.6 °F (36.4 °C), Min:97.3 °F (36.3 °C), Max:98.2 °F (36.8 °C)  IOBRIEF    Intake/Output Summary (Last 24 hours) at 2022 1450  Last data filed at 2022 0600  Gross per 24 hour   Intake 120 ml   Output 400 ml   Net -280 ml       General:  Alert, cooperative, no acute distress  . Productive cough during interview. HEENT: TAMARA anicteric sclerae. Pulmonary: Decreased breath sounds at bases  Cardiovascular: Regular rate and Rhythm.   GI:  Soft, Non distended, Non tender. Nabs  Extremities:  No edema, cyanosis, clubbing. No calf tenderness. Neurologic: Alert and oriented X 4. No acute neuro deficits. Additional: No rash to visible skin. Medications:   Current Facility-Administered Medications   Medication Dose Route Frequency    insulin glargine (LANTUS) injection 30 Units  30 Units SubCUTAneous DAILY    insulin lispro (HUMALOG) injection 12 Units  12 Units SubCUTAneous TIDAC    [START ON 2/4/2022] dexamethasone (DECADRON) 4 mg/mL injection 10 mg  10 mg IntraVENous Q24H    ipratropium-albuterol (COMBIVENT RESPIMAT) 20 mcg-100 mcg inhalation spray  1 Puff Inhalation Q4H RT    budesonide-formoteroL (SYMBICORT) 160-4.5 mcg/actuation HFA inhaler 2 Puff  2 Puff Inhalation BID RT    sodium chloride (NS) flush 5-10 mL  5-10 mL IntraVENous PRN    insulin lispro (HUMALOG) injection   SubCUTAneous AC&HS    glucose chewable tablet 16 g  16 g Oral PRN    glucagon (GLUCAGEN) injection 1 mg  1 mg IntraMUSCular PRN    dextrose 10% infusion 125-250 mL  125-250 mL IntraVENous PRN    dexamethasone (DECADRON) 4 mg/mL injection 10 mg  10 mg IntraVENous Q12H    acetaminophen (TYLENOL) tablet 500 mg  500 mg Oral Q6H PRN    ondansetron (ZOFRAN) injection 4 mg  4 mg IntraVENous Q4H PRN    DULoxetine (CYMBALTA) capsule 30 mg  30 mg Oral DAILY    gabapentin (NEURONTIN) capsule 100 mg  100 mg Oral TID    omega-3 acid ethyl esters (LOVAZA) capsule 1,000 mg  1 g Oral BID WITH MEALS    enoxaparin (LOVENOX) injection 40 mg  40 mg SubCUTAneous Q24H    hydrALAZINE (APRESOLINE) tablet 25 mg  25 mg Oral TID PRN       Imaging:   XR Results (most recent):  Results from Hospital Encounter encounter on 01/30/22    XR CHEST PORT    Narrative  EXAM: XR CHEST PORT    INDICATION: 26 years Female. cough. ADDITIONAL HISTORY: None. TECHNIQUE: Frontal view of the chest.    COMPARISON: 7/19/2021    FINDINGS:    Underpenetration of the lung bases.  Obscuration of the left heart border and  left hemidiaphragm. The cardiac silhouette appears similar to prior. Dense opacity is at the left  lung base, blunting of the left costophrenic sulcus. Hazy opacity at the right  lung base. Diffuse bilateral interstitial opacities within the left greater than  right lungs. Additional airspace opacity at the peripheral aspect of the left  mid/upper lung zone. The right costophrenic sulcus is sharp. No definite pneumothorax. No acute osseous abnormality appreciated. Impression  1. Multifocal patchy airspace opacities involving the left greater than right  lung bases as well as the left midlung zone. Background of diffuse bilateral  interstitial opacities. Findings are most suspicious for multifocal pneumonia,  possible Covid 19 pneumonia; although other infectious or inflammatory process  is possible. Recommend radiographic follow-up within 6-8 weeks after appropriate  medical therapy and imaging follow-up until clearance. 2.  Possible left pleural effusion. CT Results (most recent):  Results from [place] encounter on 01/30/22    CTA CHEST W OR W WO CONT    Narrative  CTA CHEST PULMONARY EMBOLISM PROTOCOL    INDICATION: Morbid obesity, asthma with shortness of breath, wheezing, diarrhea,  cough, headache, myalgias, decreased appetite, loss of taste and smell, nausea,  vomiting, question of Covid. Question pulmonary embolism. TECHNIQUE: Thin collimation axial images obtained through the level of the  pulmonary arteries with additional imaging through the chest following the  uneventful administration of intravenous contrast.  Images reconstructed into  three dimensional coronal and sagittal projections for complete evaluation of  the tortuous and overlapping pulmonary vascular structures and to reduce patient  radiation dose.     All CT scans at this facility are performed using dose optimization technique as  appropriate to a performed exam, to include automated exposure control,  adjustment of the mA and/or kV according to patient size (including appropriate  matching first site-specific examinations), or use of iterative reconstruction  technique. COMPARISON: 2/1/2021    FINDINGS: Evaluation limited by body habitus. There is suboptimal contrast density to assess for chronic filling defects and  respiratory motion also limits assessment. No visible pulmonary emboli, though  many of the segments are nondiagnostic. Thyroid: Unremarkable in its visualized aspects. Pericardium/ Heart: No significant effusion. Aorta/ Vessels: No aneurysm or dissection. Lymph Nodes: 1.1 cm left periaortic node. 1.2 cm subcarinal node. 1.4 cm right  hilar node. .    Lungs: There are bilateral dense lung consolidations, left greater than right. The majority of the left lung is affected. Large consolidation in the right  middle lobe. Pleura: Trace bilateral pleural effusions. No pneumothorax. Upper Abdomen: Hepatic steatosis. The spleen is not completely included in the  field-of-view but may be enlarged. Bones/soft tissues: Degenerative disc disease. .    Impression  1. No visible acute pulmonary emboli, though several of the segments are  nondiagnostic secondary to respiratory motion. 2.  Multifocal pneumonia which may be bacterial or viral. If viral, is more  advanced given the dense consolidations. 3.  Trace bilateral pleural effusions which are atypical in Covid 19.  4.  Mild lymphadenopathy, likely reactive, also atypical in Covid 19.  5.  Hepatic steatosis. 6.  Incompletely visualized but potential splenomegaly.            Labs:    Recent Results (from the past 48 hour(s))   GLUCOSE, POC    Collection Time: 01/31/22  6:08 PM   Result Value Ref Range    Glucose (POC) 307 (H) 70 - 110 mg/dL   GLUCOSE, POC    Collection Time: 01/31/22 11:33 PM   Result Value Ref Range    Glucose (POC) 329 (H) 70 - 110 mg/dL   MAGNESIUM    Collection Time: 02/01/22  4:06 AM   Result Value Ref Range Magnesium 2.7 (H) 1.6 - 2.6 mg/dL   METABOLIC PANEL, COMPREHENSIVE    Collection Time: 02/01/22  4:06 AM   Result Value Ref Range    Sodium 136 136 - 145 mmol/L    Potassium 3.9 3.5 - 5.5 mmol/L    Chloride 99 (L) 100 - 111 mmol/L    CO2 28 21 - 32 mmol/L    Anion gap 9 3.0 - 18 mmol/L    Glucose 327 (H) 74 - 99 mg/dL    BUN 15 7.0 - 18 MG/DL    Creatinine 0.67 0.6 - 1.3 MG/DL    BUN/Creatinine ratio 22 (H) 12 - 20      GFR est AA >60 >60 ml/min/1.73m2    GFR est non-AA >60 >60 ml/min/1.73m2    Calcium 8.7 8.5 - 10.1 MG/DL    Bilirubin, total 0.6 0.2 - 1.0 MG/DL    ALT (SGPT) 94 (H) 13 - 56 U/L    AST (SGOT) 62 (H) 10 - 38 U/L    Alk.  phosphatase 62 45 - 117 U/L    Protein, total 7.1 6.4 - 8.2 g/dL    Albumin 2.9 (L) 3.4 - 5.0 g/dL    Globulin 4.2 (H) 2.0 - 4.0 g/dL    A-G Ratio 0.7 (L) 0.8 - 1.7     C REACTIVE PROTEIN, QT    Collection Time: 02/01/22  4:06 AM   Result Value Ref Range    C-Reactive protein 5.2 (H) 0 - 0.3 mg/dL   PROCALCITONIN    Collection Time: 02/01/22  4:06 AM   Result Value Ref Range    Procalcitonin <0.05 ng/mL   D DIMER    Collection Time: 02/01/22  4:06 AM   Result Value Ref Range    D DIMER 0.49 (H) <0.46 ug/ml(FEU)   GLUCOSE, POC    Collection Time: 02/01/22  9:30 AM   Result Value Ref Range    Glucose (POC) 317 (H) 70 - 110 mg/dL   GLUCOSE, POC    Collection Time: 02/01/22  1:24 PM   Result Value Ref Range    Glucose (POC) 302 (H) 70 - 110 mg/dL   GLUCOSE, POC    Collection Time: 02/01/22  6:29 PM   Result Value Ref Range    Glucose (POC) 334 (H) 70 - 110 mg/dL   GLUCOSE, POC    Collection Time: 02/01/22 10:37 PM   Result Value Ref Range    Glucose (POC) 407 (HH) 70 - 110 mg/dL   GLUCOSE, POC    Collection Time: 02/01/22 10:39 PM   Result Value Ref Range    Glucose (POC) 412 (HH) 70 - 509 mg/dL   METABOLIC PANEL, COMPREHENSIVE    Collection Time: 02/02/22  1:02 AM   Result Value Ref Range    Sodium 133 (L) 136 - 145 mmol/L    Potassium 4.0 3.5 - 5.5 mmol/L    Chloride 99 (L) 100 - 111 mmol/L    CO2 30 21 - 32 mmol/L    Anion gap 4 3.0 - 18 mmol/L    Glucose 369 (H) 74 - 99 mg/dL    BUN 17 7.0 - 18 MG/DL    Creatinine 0.71 0.6 - 1.3 MG/DL    BUN/Creatinine ratio 24 (H) 12 - 20      GFR est AA >60 >60 ml/min/1.73m2    GFR est non-AA >60 >60 ml/min/1.73m2    Calcium 8.5 8.5 - 10.1 MG/DL    Bilirubin, total 0.6 0.2 - 1.0 MG/DL    ALT (SGPT) 95 (H) 13 - 56 U/L    AST (SGOT) 67 (H) 10 - 38 U/L    Alk. phosphatase 59 45 - 117 U/L    Protein, total 6.8 6.4 - 8.2 g/dL    Albumin 2.8 (L) 3.4 - 5.0 g/dL    Globulin 4.0 2.0 - 4.0 g/dL    A-G Ratio 0.7 (L) 0.8 - 1.7     C REACTIVE PROTEIN, QT    Collection Time: 02/02/22  1:02 AM   Result Value Ref Range    C-Reactive protein 2.3 (H) 0 - 0.3 mg/dL   PROCALCITONIN    Collection Time: 02/02/22  1:02 AM   Result Value Ref Range    Procalcitonin <0.05 ng/mL   D DIMER    Collection Time: 02/02/22  1:02 AM   Result Value Ref Range    D DIMER 0.48 (H) <0.46 ug/ml(FEU)   GLUCOSE, POC    Collection Time: 02/02/22  3:40 AM   Result Value Ref Range    Glucose (POC) 333 (H) 70 - 110 mg/dL   GLUCOSE, POC    Collection Time: 02/02/22  7:45 AM   Result Value Ref Range    Glucose (POC) 313 (H) 70 - 110 mg/dL   GLUCOSE, POC    Collection Time: 02/02/22 11:31 AM   Result Value Ref Range    Glucose (POC) 389 (H) 70 - 110 mg/dL       Signed By: Miguel Angel Ga MD     February 2, 2022      I spent 25 minutes with the patient in face-to-face consultation, of which greater than 50% was spent in counseling and coordination of care as described above    Disclaimer: Sections of this note are dictated using utilizing voice recognition software. Minor typographical errors may be present. If questions arise, please do not hesitate to contact me or call our department.

## 2022-02-02 NOTE — PROGRESS NOTES
conducted an initial consultation and Spiritual Assessment for Neyda Mathew, who is a 26 y.o.,female. Patients Primary Language is: [place]. According to the patients EMR Catholic Affiliation is: No Buddhist. The reason the Patient came to the hospital is:   Patient Active Problem List    Diagnosis Date Noted    Acute respiratory failure with hypoxia ([facility] [address] 01/30/2022    Hypoxia 01/30/2022    Pneumonia due to COVID-19 virus 01/30/2022        The  provided the following Interventions:  Initiated a relationship of care and support. Explored issues of destinee, belief, spirituality and Anglican/ritual needs while hospitalized. Listened empathically. Provided information about Spiritual Care Services. Offered prayer and assurance of continued prayers on patient's behalf. Chart reviewed. The following outcomes where achieved:  Patient shared limited information about both their medical narrative and spiritual journey/beliefs. Patient processed feeling about current hospitalization. Patient expressed gratitude for 's visit. Assessment:  Patient does not have any Anglican/cultural needs that will affect patients preferences in health care. There are no spiritual or Anglican issues which require intervention at this time. Plan:  Chaplains will continue to follow and will provide pastoral care on an as needed/requested basis.  recommends bedside caregivers page  on duty if patient shows signs of acute spiritual or emotional distress.       [address] [facility]   [phone]

## 2022-02-02 NOTE — PROGRESS NOTES
Nutrition Assessment (Disaster Mode)    Type and Reason for Visit: Initial,Positive nutrition screen    Nutrition Recommendations/Plan:   Continue with current diet       Nutrition Assessment:   Nutrition Assessment: Spoke with patient over the phone. She reports appetite is coming back now. She also denies any difficulty eating and breathing at this time. Nutrition Related Findings: Last BM 2/1. Meds: Lantus, meal time insulin and ISS. Nutrition History and Allergies: PMH: DM2, obese. Patient presented with worsening shortness of breath, foudn to have pneumonia due to covid. Patient denies any recent weight loss or weight loss in last several months. Cultural/Sabianism/Ethnic Food Preference(s): None     Total Energy Requirements (kcals/day): [id number] Energy Requirements Based On: Kcal/kg (11-14) Weight Used for Energy Requirements: Current (199.6 kg)  Total Protein Requirements (g/day): 110-140g (20% kcals)      Method Used for Fluid Requirements: 1 ml/kcal    Current Nutrition Therapies:  ADULT DIET Regular; 4 carb choices (60 gm/meal); No Concentrated Sweets     Anthropometric Measures:  Height: 5' 4\" (162.6 cm) Weight: (!) 199.6 kg (440 lb) Body mass index is 75.53 kg/m². Ideal Body Weight (lbs) (Calculated): 120 lbs Ideal Body Weight (Kg) (Calculated): 55 kg % IBW (Calculated): 366.7 %                 Nutrition Diagnosis:   No nutrition diagnosis at this time      Nutrition Interventions:   Food and/or Nutrient Delivery: Continue current diet  Nutrition Education/Counseling: No recommendations at this time  Coordination of Nutrition Care: Continue to monitor while inpatient       Goals: Intake >75% of nutritional needs by follow up    Nutrition Monitoring and Evaluation: Patient will be monitored per nutrition standards of care. Consult Dietitian if nutrition intervention essential to patient care is needed.    Behavioral-Environmental Outcomes: None identified  Food/Nutrient Intake Outcomes: Food and nutrient intake  Physical Signs/Symptoms Outcomes: None identified    Discharge Planning: No discharge needs at this time    Electronically signed by Cody Shipman MS, RD, LD on 2/2/2022 at 10:10 AM.      Disaster Mode Active

## 2022-02-02 NOTE — PROGRESS NOTES
Bedside shift change report given to [facility] (oncoming nurse) by Mary Oneal (offgoing nurse). Report included the following information SBAR, Kardex, MAR and Recent Results.

## 2022-02-02 NOTE — PROGRESS NOTES
[facility]  Pulmonary, Critical Care, and Sleep Medicine    Pulmonary Medicine Progress Note    Name: Nnamdi Ware MRN: [id number]  : 1995 Hospital: [address] [place] [address] Dr  Date: 2022       Subjective:  Pt resting comfortably. No distress. On NC. Denies cough. Patient Active Problem List   Diagnosis Code    Acute respiratory failure with hypoxia ([facility]) J96.01    Hypoxia R09.02    Pneumonia due to COVID-19 virus U07.1, J12.82       Assessment:  · Acute Hypoxic Respiratory Failure  · - 2/2 covid19 pneumonia  · - currently on 10L [place]- sats 90-93%  · Covid19 Pneumonia  · - symptoms for several weeks, +   · - baracitinib per ID  · Hx of Asthma  · - not on current therapy  · Morbid Obesity  · DM  · - A1C 12    Impression/Plan:  · Wean O2 as tolerated, appears better today. On 12L, wean to >90%  · Continue High dose decadron  · Baricitinib per ID  · Symbicort and Combivent  · Antibiotics per ID- no need from my perspective. · Trend Ddimer- may need dopplers     High risk of decompensation given the patient's size.      · Will sign off. FiO2 to keep SpO2 >=92%, HOB >=30 degree, aspiration precautions, aggressive pulmonary toileting, incentive spirometry. Other issues management by primary team and respective consultants. Events and notes from last 24 hours reviewed. Discussed with patient and family, answered all questions to their satisfaction. Care plan discussed with nursing.      Labs and images personally seen and available reports reviewed  All current medicines are reviewed       Medications- Current:  Current Facility-Administered Medications   Medication Dose Route Frequency    insulin glargine (LANTUS) injection 30 Units  30 Units SubCUTAneous DAILY    insulin lispro (HUMALOG) injection 12 Units  12 Units SubCUTAneous TIDAC    [START ON 2022] dexamethasone (DECADRON) 4 mg/mL injection 10 mg  10 mg IntraVENous Q24H    ipratropium-albuterol (COMBIVENT RESPIMAT) 20 mcg-100 mcg inhalation spray  1 Puff Inhalation Q4H RT    budesonide-formoteroL (SYMBICORT) 160-4.5 mcg/actuation HFA inhaler 2 Puff  2 Puff Inhalation BID RT    sodium chloride (NS) flush 5-10 mL  5-10 mL IntraVENous PRN    insulin lispro (HUMALOG) injection   SubCUTAneous AC&HS    glucose chewable tablet 16 g  16 g Oral PRN    glucagon (GLUCAGEN) injection 1 mg  1 mg IntraMUSCular PRN    dextrose 10% infusion 125-250 mL  125-250 mL IntraVENous PRN    dexamethasone (DECADRON) 4 mg/mL injection 10 mg  10 mg IntraVENous Q12H    acetaminophen (TYLENOL) tablet 500 mg  500 mg Oral Q6H PRN    ondansetron (ZOFRAN) injection 4 mg  4 mg IntraVENous Q4H PRN    DULoxetine (CYMBALTA) capsule 30 mg  30 mg Oral DAILY    gabapentin (NEURONTIN) capsule 100 mg  100 mg Oral TID    omega-3 acid ethyl esters (LOVAZA) capsule 1,000 mg  1 g Oral BID WITH MEALS    enoxaparin (LOVENOX) injection 40 mg  40 mg SubCUTAneous Q24H    hydrALAZINE (APRESOLINE) tablet 25 mg  25 mg Oral TID PRN       Objective:  Vital Signs:    Visit Vitals  BP (!) 141/80 (BP 1 Location: Left upper arm, BP Patient Position: At rest)   Pulse 67   Temp 97.4 °F (36.3 °C)   Resp 18   Ht 5' 4\" (1.626 m)   Wt (!) 199.6 kg (440 lb)   SpO2 91%   Breastfeeding No   BMI 75.53 kg/m²      O2 Device: Nasal cannula  O2 Flow Rate (L/min): 8 l/min  Temp (24hrs), Av.6 °F (36.4 °C), Min:97.3 °F (36.3 °C), Max:98.2 °F (36.8 °C)      Intake/Output:   Last shift:      No intake/output data recorded. Last 3 shifts: 1901 -  0700  In: 120 [P.O.:120]  Out: 400 [Urine:400]    Intake/Output Summary (Last 24 hours) at 2022 1442  Last data filed at 2022 0600  Gross per 24 hour   Intake 120 ml   Output 400 ml   Net -280 ml       Physical Exam:     General/Neurology: Alert, Awake  Head:   Normocephalic, without obvious abnormality  Eye:   PERRL, EOM intact  Oral:  Mucus membranes moist  Lung:   B/l air entry fair. No rales.  No rhonchi. No wheezing  Heart:   S1 S2 present  Abdomen:  Soft, non tender, BS+nt   Extremities:  No pedal edema. Skin:   Dry, intact  Data:      Recent Results (from the past 24 hour(s))   GLUCOSE, POC    Collection Time: 02/01/22  6:29 PM   Result Value Ref Range    Glucose (POC) 334 (H) 70 - 110 mg/dL   GLUCOSE, POC    Collection Time: 02/01/22 10:37 PM   Result Value Ref Range    Glucose (POC) 407 (HH) 70 - 110 mg/dL   GLUCOSE, POC    Collection Time: 02/01/22 10:39 PM   Result Value Ref Range    Glucose (POC) 412 (HH) 70 - 619 mg/dL   METABOLIC PANEL, COMPREHENSIVE    Collection Time: 02/02/22  1:02 AM   Result Value Ref Range    Sodium 133 (L) 136 - 145 mmol/L    Potassium 4.0 3.5 - 5.5 mmol/L    Chloride 99 (L) 100 - 111 mmol/L    CO2 30 21 - 32 mmol/L    Anion gap 4 3.0 - 18 mmol/L    Glucose 369 (H) 74 - 99 mg/dL    BUN 17 7.0 - 18 MG/DL    Creatinine 0.71 0.6 - 1.3 MG/DL    BUN/Creatinine ratio 24 (H) 12 - 20      GFR est AA >60 >60 ml/min/1.73m2    GFR est non-AA >60 >60 ml/min/1.73m2    Calcium 8.5 8.5 - 10.1 MG/DL    Bilirubin, total 0.6 0.2 - 1.0 MG/DL    ALT (SGPT) 95 (H) 13 - 56 U/L    AST (SGOT) 67 (H) 10 - 38 U/L    Alk.  phosphatase 59 45 - 117 U/L    Protein, total 6.8 6.4 - 8.2 g/dL    Albumin 2.8 (L) 3.4 - 5.0 g/dL    Globulin 4.0 2.0 - 4.0 g/dL    A-G Ratio 0.7 (L) 0.8 - 1.7     C REACTIVE PROTEIN, QT    Collection Time: 02/02/22  1:02 AM   Result Value Ref Range    C-Reactive protein 2.3 (H) 0 - 0.3 mg/dL   PROCALCITONIN    Collection Time: 02/02/22  1:02 AM   Result Value Ref Range    Procalcitonin <0.05 ng/mL   D DIMER    Collection Time: 02/02/22  1:02 AM   Result Value Ref Range    D DIMER 0.48 (H) <0.46 ug/ml(FEU)   GLUCOSE, POC    Collection Time: 02/02/22  3:40 AM   Result Value Ref Range    Glucose (POC) 333 (H) 70 - 110 mg/dL   GLUCOSE, POC    Collection Time: 02/02/22  7:45 AM   Result Value Ref Range    Glucose (POC) 313 (H) 70 - 110 mg/dL   GLUCOSE, POC    Collection Time: 02/02/22 11:31 AM   Result Value Ref Range    Glucose (POC) 389 (H) 70 - 110 mg/dL         Chemistry   Recent Labs     02/02/22  0102 02/01/22  0406 01/31/22  0313 01/30/22  1709 01/30/22  1709   * 327* 375*   < > 304*   * 136 134*   < > 134*   K 4.0 3.9 3.8   < > 3.8   CL 99* 99* 98*   < > 98*   CO2 30 28 28   < > 29   BUN 17 15 13   < > 9   CREA 0.71 0.67 0.83   < > 0.80   CA 8.5 8.7 8.0*   < > 7.3*   MG  --  2.7* 2.1  --  1.7   AGAP 4 9 8   < > 7   BUCR 24* 22* 16   < > 11*   AP 59 62 63   < > 55   TP 6.8 7.1 7.1   < > 6.6   ALB 2.8* 2.9* 3.0*   < > 2.6*   GLOB 4.0 4.2* 4.1*   < > 4.0   AGRAT 0.7* 0.7* 0.7*   < > 0.7*    < > = values in this interval not displayed. CBC w/Diff   Recent Labs     01/31/22  0313   WBC 5.8   RBC 4.71   HGB 13.1   HCT 42.6      GRANS 80*   LYMPH 15*   EOS 0       ABG No results for input(s): PHI, PHI, POC2, PCO2I, PO2, PO2I, HCO3, HCO3I, FIO2, FIO2I in the last 72 hours. Micro    Recent Labs     01/30/22  1615 01/30/22  1600   CULT NO GROWTH 3 DAYS GRAM POSITIVE COCCI IN CLUSTERS GROWING IN THE ANAEROBIC BOTTLE , SITE = [facility]*     Recent Labs     01/30/22  1615 01/30/22  1600   CULT NO GROWTH 3 DAYS GRAM POSITIVE COCCI IN CLUSTERS GROWING IN THE ANAEROBIC BOTTLE , SITE = [facility]*       CT (Most Recent) Results from Hospital Encounter encounter on 01/30/22    CTA CHEST W OR W WO CONT    Narrative  CTA CHEST PULMONARY EMBOLISM PROTOCOL    INDICATION: Morbid obesity, asthma with shortness of breath, wheezing, diarrhea,  cough, headache, myalgias, decreased appetite, loss of taste and smell, nausea,  vomiting, question of Covid. Question pulmonary embolism.     TECHNIQUE: Thin collimation axial images obtained through the level of the  pulmonary arteries with additional imaging through the chest following the  uneventful administration of intravenous contrast.  Images reconstructed into  three dimensional coronal and sagittal projections for complete evaluation of  the tortuous and overlapping pulmonary vascular structures and to reduce patient  radiation dose. All CT scans at this facility are performed using dose optimization technique as  appropriate to a performed exam, to include automated exposure control,  adjustment of the mA and/or kV according to patient size (including appropriate  matching first site-specific examinations), or use of iterative reconstruction  technique. COMPARISON: 2/1/2021    FINDINGS: Evaluation limited by body habitus. There is suboptimal contrast density to assess for chronic filling defects and  respiratory motion also limits assessment. No visible pulmonary emboli, though  many of the segments are nondiagnostic. Thyroid: Unremarkable in its visualized aspects. Pericardium/ Heart: No significant effusion. Aorta/ Vessels: No aneurysm or dissection. Lymph Nodes: 1.1 cm left periaortic node. 1.2 cm subcarinal node. 1.4 cm right  hilar node. .    Lungs: There are bilateral dense lung consolidations, left greater than right. The majority of the left lung is affected. Large consolidation in the right  middle lobe. Pleura: Trace bilateral pleural effusions. No pneumothorax. Upper Abdomen: Hepatic steatosis. The spleen is not completely included in the  field-of-view but may be enlarged. Bones/soft tissues: Degenerative disc disease. .    Impression  1. No visible acute pulmonary emboli, though several of the segments are  nondiagnostic secondary to respiratory motion. 2.  Multifocal pneumonia which may be bacterial or viral. If viral, is more  advanced given the dense consolidations. 3.  Trace bilateral pleural effusions which are atypical in Covid 19.  4.  Mild lymphadenopathy, likely reactive, also atypical in Covid 19.  5.  Hepatic steatosis. 6.  Incompletely visualized but potential splenomegaly. XR (Most Recent).  CXR reviewed by me and compared with previous CXR Results from [facility] [place] on [place]/22    XR CHEST PORT    Narrative  EXAM: XR CHEST PORT    INDICATION: 26 years Female. cough. ADDITIONAL HISTORY: None. TECHNIQUE: Frontal view of the chest.    COMPARISON: 7/19/2021    FINDINGS:    Underpenetration of the lung bases. Obscuration of the left heart border and  left hemidiaphragm. The cardiac silhouette appears similar to prior. Dense opacity is at the left  lung base, blunting of the left costophrenic sulcus. Hazy opacity at the right  lung base. Diffuse bilateral interstitial opacities within the left greater than  right lungs. Additional airspace opacity at the peripheral aspect of the left  mid/upper lung zone. The right costophrenic sulcus is sharp. No definite pneumothorax. No acute osseous abnormality appreciated. Impression  1. Multifocal patchy airspace opacities involving the left greater than right  lung bases as well as the left midlung zone. Background of diffuse bilateral  interstitial opacities. Findings are most suspicious for multifocal pneumonia,  possible Covid 19 pneumonia; although other infectious or inflammatory process  is possible. Recommend radiographic follow-up within 6-8 weeks after appropriate  medical therapy and imaging follow-up until clearance. 2.  Possible left pleural effusion. See my orders for details     Total care time exclusive of procedures with complex decision making, coordination of care and counseling patient performed and > 50% time spent in face to face evaluation as mentioned above.     Moustapha Sadler MD  Critical Care Medicine

## 2022-02-03 LAB
ANION GAP SERPL CALC-SCNC: 5 MMOL/L (ref 3–18)
BACTERIA SPEC CULT: ABNORMAL
BASOPHILS # BLD: 0 K/UL (ref 0–0.1)
BASOPHILS NFR BLD: 0 % (ref 0–2)
BUN SERPL-MCNC: 16 MG/DL (ref 7–18)
BUN/CREAT SERPL: 24 (ref 12–20)
CALCIUM SERPL-MCNC: 8.6 MG/DL (ref 8.5–10.1)
CHLORIDE SERPL-SCNC: 99 MMOL/L (ref 100–111)
CO2 SERPL-SCNC: 30 MMOL/L (ref 21–32)
CREAT SERPL-MCNC: 0.67 MG/DL (ref 0.6–1.3)
CRP SERPL-MCNC: 1 MG/DL (ref 0–0.3)
D DIMER PPP FEU-MCNC: 0.52 UG/ML(FEU)
DIFFERENTIAL METHOD BLD: NORMAL
EOSINOPHIL # BLD: 0 K/UL (ref 0–0.4)
EOSINOPHIL NFR BLD: 0 % (ref 0–5)
ERYTHROCYTE [DISTWIDTH] IN BLOOD BY AUTOMATED COUNT: 13.9 % (ref 11.6–14.5)
GLUCOSE BLD STRIP.AUTO-MCNC: 318 MG/DL (ref 70–110)
GLUCOSE BLD STRIP.AUTO-MCNC: 331 MG/DL (ref 70–110)
GLUCOSE BLD STRIP.AUTO-MCNC: 353 MG/DL (ref 70–110)
GLUCOSE BLD STRIP.AUTO-MCNC: 357 MG/DL (ref 70–110)
GLUCOSE BLD STRIP.AUTO-MCNC: 359 MG/DL (ref 70–110)
GLUCOSE SERPL-MCNC: 352 MG/DL (ref 74–99)
GRAM STN SPEC: ABNORMAL
GRAM STN SPEC: ABNORMAL
HCT VFR BLD AUTO: 41.9 % (ref 35–45)
HGB BLD-MCNC: 13.1 G/DL (ref 12–16)
IMM GRANULOCYTES # BLD AUTO: 0 K/UL
IMM GRANULOCYTES NFR BLD AUTO: 0 %
LYMPHOCYTES # BLD: 1.4 K/UL (ref 0.9–3.6)
LYMPHOCYTES NFR BLD: 21 % (ref 21–52)
MCH RBC QN AUTO: 28.4 PG (ref 24–34)
MCHC RBC AUTO-ENTMCNC: 31.3 G/DL (ref 31–37)
MCV RBC AUTO: 90.9 FL (ref 78–100)
MONOCYTES # BLD: 0.6 K/UL (ref 0.05–1.2)
MONOCYTES NFR BLD: 9 % (ref 3–10)
NEUTS BAND NFR BLD MANUAL: 3 % (ref 0–5)
NEUTS SEG # BLD: 4.7 K/UL (ref 1.8–8)
NEUTS SEG NFR BLD: 67 % (ref 40–73)
NRBC # BLD: 0 K/UL (ref 0–0.01)
NRBC BLD-RTO: 0 PER 100 WBC
PHOSPHATE SERPL-MCNC: 3.2 MG/DL (ref 2.5–4.9)
PLATELET # BLD AUTO: 324 K/UL (ref 135–420)
PLATELET COMMENTS,PCOM: NORMAL
PMV BLD AUTO: 9.6 FL (ref 9.2–11.8)
POTASSIUM SERPL-SCNC: 4.1 MMOL/L (ref 3.5–5.5)
PROCALCITONIN SERPL-MCNC: <0.05 NG/ML
RBC # BLD AUTO: 4.61 M/UL (ref 4.2–5.3)
RBC MORPH BLD: NORMAL
SERVICE CMNT-IMP: ABNORMAL
SODIUM SERPL-SCNC: 134 MMOL/L (ref 136–145)
WBC # BLD AUTO: 6.7 K/UL (ref 4.6–13.2)

## 2022-02-03 PROCEDURE — 86140 C-REACTIVE PROTEIN: CPT

## 2022-02-03 PROCEDURE — 74011250636 HC RX REV CODE- 250/636: Performed by: INTERNAL MEDICINE

## 2022-02-03 PROCEDURE — 74011636637 HC RX REV CODE- 636/637: Performed by: HOSPITALIST

## 2022-02-03 PROCEDURE — 80048 BASIC METABOLIC PNL TOTAL CA: CPT

## 2022-02-03 PROCEDURE — 84100 ASSAY OF PHOSPHORUS: CPT

## 2022-02-03 PROCEDURE — 82962 GLUCOSE BLOOD TEST: CPT

## 2022-02-03 PROCEDURE — 85379 FIBRIN DEGRADATION QUANT: CPT

## 2022-02-03 PROCEDURE — 36415 COLL VENOUS BLD VENIPUNCTURE: CPT

## 2022-02-03 PROCEDURE — 94640 AIRWAY INHALATION TREATMENT: CPT

## 2022-02-03 PROCEDURE — 84145 PROCALCITONIN (PCT): CPT

## 2022-02-03 PROCEDURE — 85025 COMPLETE CBC W/AUTO DIFF WBC: CPT

## 2022-02-03 PROCEDURE — 83735 ASSAY OF MAGNESIUM: CPT

## 2022-02-03 PROCEDURE — 65660000000 HC RM CCU STEPDOWN

## 2022-02-03 PROCEDURE — 99232 SBSQ HOSP IP/OBS MODERATE 35: CPT | Performed by: HOSPITALIST

## 2022-02-03 PROCEDURE — 74011250637 HC RX REV CODE- 250/637: Performed by: INTERNAL MEDICINE

## 2022-02-03 PROCEDURE — 74011250636 HC RX REV CODE- 250/636: Performed by: HOSPITALIST

## 2022-02-03 RX ORDER — INSULIN GLARGINE 100 [IU]/ML
37 INJECTION, SOLUTION SUBCUTANEOUS EVERY 12 HOURS
Status: DISCONTINUED | OUTPATIENT
Start: 2022-02-03 | End: 2022-02-04

## 2022-02-03 RX ADMIN — IPRATROPIUM BROMIDE AND ALBUTEROL 1 PUFF: 20; 100 SPRAY, METERED RESPIRATORY (INHALATION) at 00:00

## 2022-02-03 RX ADMIN — INSULIN LISPRO 12 UNITS: 100 INJECTION, SOLUTION INTRAVENOUS; SUBCUTANEOUS at 16:54

## 2022-02-03 RX ADMIN — Medication 15 UNITS: at 22:45

## 2022-02-03 RX ADMIN — Medication 12 UNITS: at 08:46

## 2022-02-03 RX ADMIN — ENOXAPARIN SODIUM 40 MG: 100 INJECTION SUBCUTANEOUS at 17:05

## 2022-02-03 RX ADMIN — GABAPENTIN 100 MG: 100 CAPSULE ORAL at 22:08

## 2022-02-03 RX ADMIN — INSULIN LISPRO 12 UNITS: 100 INJECTION, SOLUTION INTRAVENOUS; SUBCUTANEOUS at 08:49

## 2022-02-03 RX ADMIN — IPRATROPIUM BROMIDE AND ALBUTEROL 1 PUFF: 20; 100 SPRAY, METERED RESPIRATORY (INHALATION) at 12:12

## 2022-02-03 RX ADMIN — INSULIN GLARGINE 37 UNITS: 100 INJECTION, SOLUTION SUBCUTANEOUS at 22:44

## 2022-02-03 RX ADMIN — IPRATROPIUM BROMIDE AND ALBUTEROL 1 PUFF: 20; 100 SPRAY, METERED RESPIRATORY (INHALATION) at 04:38

## 2022-02-03 RX ADMIN — DULOXETINE 30 MG: 30 CAPSULE, DELAYED RELEASE ORAL at 08:27

## 2022-02-03 RX ADMIN — Medication 12 UNITS: at 11:47

## 2022-02-03 RX ADMIN — INSULIN GLARGINE 30 UNITS: 100 INJECTION, SOLUTION SUBCUTANEOUS at 08:46

## 2022-02-03 RX ADMIN — DEXAMETHASONE SODIUM PHOSPHATE 10 MG: 4 INJECTION, SOLUTION INTRAMUSCULAR; INTRAVENOUS at 23:06

## 2022-02-03 RX ADMIN — DEXAMETHASONE SODIUM PHOSPHATE 10 MG: 4 INJECTION, SOLUTION INTRAMUSCULAR; INTRAVENOUS at 12:05

## 2022-02-03 RX ADMIN — ENOXAPARIN SODIUM 40 MG: 100 INJECTION SUBCUTANEOUS at 05:45

## 2022-02-03 RX ADMIN — GABAPENTIN 100 MG: 100 CAPSULE ORAL at 17:06

## 2022-02-03 RX ADMIN — Medication 15 UNITS: at 16:53

## 2022-02-03 RX ADMIN — IPRATROPIUM BROMIDE AND ALBUTEROL 1 PUFF: 20; 100 SPRAY, METERED RESPIRATORY (INHALATION) at 08:11

## 2022-02-03 RX ADMIN — GABAPENTIN 100 MG: 100 CAPSULE ORAL at 08:27

## 2022-02-03 RX ADMIN — IPRATROPIUM BROMIDE AND ALBUTEROL 1 PUFF: 20; 100 SPRAY, METERED RESPIRATORY (INHALATION) at 17:15

## 2022-02-03 RX ADMIN — HYDRALAZINE HYDROCHLORIDE 25 MG: 25 TABLET, FILM COATED ORAL at 22:24

## 2022-02-03 RX ADMIN — HYDRALAZINE HYDROCHLORIDE 25 MG: 25 TABLET, FILM COATED ORAL at 05:45

## 2022-02-03 RX ADMIN — OMEGA-3-ACID ETHYL ESTERS 1000 MG: 1 CAPSULE, LIQUID FILLED ORAL at 17:06

## 2022-02-03 RX ADMIN — IPRATROPIUM BROMIDE AND ALBUTEROL 1 PUFF: 20; 100 SPRAY, METERED RESPIRATORY (INHALATION) at 21:25

## 2022-02-03 RX ADMIN — INSULIN LISPRO 12 UNITS: 100 INJECTION, SOLUTION INTRAVENOUS; SUBCUTANEOUS at 11:48

## 2022-02-03 RX ADMIN — BUDESONIDE AND FORMOTEROL FUMARATE DIHYDRATE 2 PUFF: 160; 4.5 AEROSOL RESPIRATORY (INHALATION) at 08:51

## 2022-02-03 RX ADMIN — OMEGA-3-ACID ETHYL ESTERS 1000 MG: 1 CAPSULE, LIQUID FILLED ORAL at 08:27

## 2022-02-03 RX ADMIN — BUDESONIDE AND FORMOTEROL FUMARATE DIHYDRATE 2 PUFF: 160; 4.5 AEROSOL RESPIRATORY (INHALATION) at 21:25

## 2022-02-03 NOTE — DIABETES MGMT
Diabetes/ Glycemic Control Plan of Care    New diagnosis of diabetes this admission. Patient was admitted on 1/30/2022 with report of shortness of breath, wheezing, diarrhea, decreased appetite and lost of taste/smell x 1 week. Informed and explained A1c of 12.0 (1/31/2022). Patient reported no history of diabetes. 2/01/2022: See diabetes education notes including meter and insulin instructions (vial). 2/03/2022: follow-up on diabetes education and completed training on how to use pen insulin. Recommendations:   1.) increase lantus insulin dose from 30 units every 12 hours to 37 units every 12 hours starting tonight at bedtime. Order obtained. 2.) cont mealtime bolus lispro in addition to correctional.    2/03: POC BG values still elevated in the 300's despite increasing the dose yesterday to 30 units every 12 hours. Follow-up today with patient regarding diabetes education and she has no questions. Patient reported nursing staff has been teaching her on how to use and inject SC insulin via syringe but her vision is still blurry making it difficult to read the syringe. Educated patient on how to use pen insulin and she feels comfortable using it instead of vial insulin    Assessment:   DX:   1. Acute respiratory failure with hypoxia ([address]     2.  Pneumonia due to COVID-19 virus        Noted the following assessment:  COVID-19 detected  Bilateral pneumonia  Acute on chronic mild intermittent  Asthma with exacerbation  Obesity with BMI of 75.53 (weight: 199.6 kg)    Fasting/ Morning blood glucose:   Lab Results   Component Value Date/Time    Glucose 352 (H) 02/03/2022 02:53 AM    Glucose (POC) 318 (H) 02/03/2022 11:40 AM     IV Fluids containing dextrose:   None    Steroids:   Rx Glucocorticoids (24h ago, onward)             Start     Dose Route Frequency Ordered Stop    01/31/22 0000  dexamethasone (DECADRON) 4 mg/mL injection 10 mg         10 mg IV EVERY 12 HOURS 01/30/22 1516 --               Rx Glucocorticoids (24h ago, onward)             Start     Dose Route Frequency Ordered Stop    01/31/22 0000  dexamethasone (DECADRON) 4 mg/mL injection 10 mg         10 mg IV EVERY 12 HOURS 01/30/22 1516 --                 Blood glucose values: Within target range (70-180mg/dL): No    Current insulin orders:   Basal lantus insulin 37 units every 12 hours  Mealtime bolus lispro 12 units TID AC  Correctional lispro insulin. Very resistant dose per protocol    Total Daily Dose previous 24 hours: 151 units of insulin  Lantus: 60 units  Lispro: 91 units (mealtime bolus and correctional)    Current A1c:   Lab Results   Component Value Date/Time    Hemoglobin A1c 12.0 (H) 01/31/2022 03:13 AM      equivalent  to ave Blood Glucose of 298 mg/dl for 2-3 months prior to admission    Adequate glycemic control PTA: N/A. Patient reported no history of diabetes prior to this admission. Nutrition/Diet:   Active Orders   Diet    ADULT DIET Regular; 3 carb choices (45 gm/meal); No Concentrated Sweets      Meal Intake:  Patient Vitals for the past 168 hrs:   % Diet Eaten   02/02/22 1730 76 - 100%   02/02/22 1230 76 - 100%   02/02/22 0830 76 - 100%   02/01/22 2101 76 - 100%     Supplement Intake:  No data found. Home diabetes medications:  1/31/2022:  Patient reported no history of diabetes prior to this admission. Key Antihyperglycemic Medications     Patient is on no antihyperglycemic meds. Plan/Goals:   Blood glucose will be within target of 70 - 180 mg/dl within 72 hours  Reinforce dietary and medication compliance at home.        Education: 1/31/2022:     [x] Refer to Diabetes Education Record: 2/01/2022 and follow-up on 2/03/2022   [] Education not indicated at this time     Caron Samuels RN [facility]  Pager: [phone]

## 2022-02-03 NOTE — PROGRESS NOTES
[facility] Hospitalist Group  Progress Note    Patient: Claudy Ricardo Age: 23 y.o. : 1995 MR#: [id number] SSN: [id number]  Date/Time: 2/3/2022     Subjective:       Spoke with patient over the phone 9:10 AM, states she is Breathing \"alright, not really sure. \" Has been up to the bathroom, \"I felt okay, usually I get a little dizzy, but I felt fine this morning. \" denies dyspnea on exertion milk. For breakfast she had banana, milk,eggs, potatoes and sausage. Denies dysuria. Last bowel movement last nigth, normal. Initially she felt constipated, but then improved. Still productive cough, less blood, sputum is varying shades of yellow. States she plans to quit smoking. 2:30 PM patient seen and examined at bedside, she reports no new updates since this morning. Continues to deny dyspnea when she ambulates to the restroom. Presently on 5 L of oxygen per minute. Assessment/Plan:     1. COVID-19 pneumonia-continue IV Decadron. s/p Tocilizumab. antibiotics completed. Monitor inflammatory markers. ID, pulmonology follows. 2. Acute hypoxic respiratory failure secondary to COVID-19 pneumonia-continue supplemental oxygen on 10 L salter, pulmonary on board. 3. History of neuropathy-continue Neurontin and Cymbalta. 4. Type 2 diabetes-A1c 12, continue Lantus and insulin sliding scale, diabetic education on board. 5. Super morbid obesity  DVT prophylaxis-Lovenox dose increased. Full code      Discussed on IDT.        Leann Blackburn MD  23      Case discussed with:  [x]Patient  []Family  [x]Nursing  [x]Case Management  DVT Prophylaxis:  [x]Lovenox  []Hep SQ  []SCDs  []Coumadin   []On Heparin gtt    Objective:   VS:   Visit Vitals  /87 (BP 1 Location: Right upper arm, BP Patient Position: At rest;Sitting)   Pulse 67   Temp 97.6 °F (36.4 °C)   Resp 18   Ht 5' 4\" (1.626 m)   Wt (!) 199.6 kg (440 lb)   SpO2 92%   Breastfeeding No   BMI 75.53 kg/m²      Tmax/24hrs: Temp (24hrs), Av.7 °F (36.5 °C), Min:97.4 °F (36.3 °C), Max:98 °F (36.7 °C)  IOBRIEF    Intake/Output Summary (Last 24 hours) at 2/3/2022 0910  Last data filed at 2/3/2022 3645  Gross per 24 hour   Intake 960 ml   Output 1000 ml   Net -40 ml       General:  Alert, cooperative, no acute distress  . Found sitting up in chair, speaking full sentences on supplemental oxygen. HEENT: PERRLA, anicteric sclerae. Pulmonary: Decreased breath sounds at bases  Cardiovascular: Regular rate and Rhythm. GI:  Soft, Non distended, Non tender. Nabs  Extremities:  No edema, cyanosis, clubbing. No calf tenderness. Neurologic: Alert and oriented X 4. No acute neuro deficits. Additional: No rash to visible skin.     Medications:   Current Facility-Administered Medications   Medication Dose Route Frequency    insulin lispro (HUMALOG) injection 12 Units  12 Units SubCUTAneous TIDAC    enoxaparin (LOVENOX) injection 40 mg  40 mg SubCUTAneous Q12H    insulin glargine (LANTUS) injection 30 Units  30 Units SubCUTAneous Q12H    [START ON 2022] dexamethasone (DECADRON) 4 mg/mL injection 10 mg  10 mg IntraVENous Q24H    ipratropium-albuterol (COMBIVENT RESPIMAT) 20 mcg-100 mcg inhalation spray  1 Puff Inhalation Q4H RT    budesonide-formoteroL (SYMBICORT) 160-4.5 mcg/actuation HFA inhaler 2 Puff  2 Puff Inhalation BID RT    sodium chloride (NS) flush 5-10 mL  5-10 mL IntraVENous PRN    insulin lispro (HUMALOG) injection   SubCUTAneous AC&HS    glucose chewable tablet 16 g  16 g Oral PRN    glucagon (GLUCAGEN) injection 1 mg  1 mg IntraMUSCular PRN    dextrose 10% infusion 125-250 mL  125-250 mL IntraVENous PRN    dexamethasone (DECADRON) 4 mg/mL injection 10 mg  10 mg IntraVENous Q12H    acetaminophen (TYLENOL) tablet 500 mg  500 mg Oral Q6H PRN    ondansetron (ZOFRAN) injection 4 mg  4 mg IntraVENous Q4H PRN    DULoxetine (CYMBALTA) capsule 30 mg  30 mg Oral DAILY    gabapentin (NEURONTIN) capsule 100 mg  100 mg Oral TID    omega-3 acid ethyl esters (LOVAZA) capsule 1,000 mg  1 g Oral BID WITH MEALS    hydrALAZINE (APRESOLINE) tablet 25 mg  25 mg Oral TID PRN       Imaging:   XR Results (most recent):  Results from Hospital Encounter encounter on 01/30/22    XR CHEST PORT    Narrative  EXAM: XR CHEST PORT    INDICATION: 26 years Female. cough. ADDITIONAL HISTORY: None. TECHNIQUE: Frontal view of the chest.    COMPARISON: 7/19/2021    FINDINGS:    Underpenetration of the lung bases. Obscuration of the left heart border and  left hemidiaphragm. The cardiac silhouette appears similar to prior. Dense opacity is at the left  lung base, blunting of the left costophrenic sulcus. Hazy opacity at the right  lung base. Diffuse bilateral interstitial opacities within the left greater than  right lungs. Additional airspace opacity at the peripheral aspect of the left  mid/upper lung zone. The right costophrenic sulcus is sharp. No definite pneumothorax. No acute osseous abnormality appreciated. Impression  1. Multifocal patchy airspace opacities involving the left greater than right  lung bases as well as the left midlung zone. Background of diffuse bilateral  interstitial opacities. Findings are most suspicious for multifocal pneumonia,  possible Covid 19 pneumonia; although other infectious or inflammatory process  is possible. Recommend radiographic follow-up within 6-8 weeks after appropriate  medical therapy and imaging follow-up until clearance. 2.  Possible left pleural effusion. CT Results (most recent):  Results from [place] encounter on 01/30/22    CTA CHEST W OR W WO CONT    Narrative  CTA CHEST PULMONARY EMBOLISM PROTOCOL    INDICATION: Morbid obesity, asthma with shortness of breath, wheezing, diarrhea,  cough, headache, myalgias, decreased appetite, loss of taste and smell, nausea,  vomiting, question of Covid. Question pulmonary embolism.     TECHNIQUE: Thin collimation axial images obtained through the level of the  pulmonary arteries with additional imaging through the chest following the  uneventful administration of intravenous contrast.  Images reconstructed into  three dimensional coronal and sagittal projections for complete evaluation of  the tortuous and overlapping pulmonary vascular structures and to reduce patient  radiation dose. All CT scans at this facility are performed using dose optimization technique as  appropriate to a performed exam, to include automated exposure control,  adjustment of the mA and/or kV according to patient size (including appropriate  matching first site-specific examinations), or use of iterative reconstruction  technique. COMPARISON: 2/1/2021    FINDINGS: Evaluation limited by body habitus. There is suboptimal contrast density to assess for chronic filling defects and  respiratory motion also limits assessment. No visible pulmonary emboli, though  many of the segments are nondiagnostic. Thyroid: Unremarkable in its visualized aspects. Pericardium/ Heart: No significant effusion. Aorta/ Vessels: No aneurysm or dissection. Lymph Nodes: 1.1 cm left periaortic node. 1.2 cm subcarinal node. 1.4 cm right  hilar node. .    Lungs: There are bilateral dense lung consolidations, left greater than right. The majority of the left lung is affected. Large consolidation in the right  middle lobe. Pleura: Trace bilateral pleural effusions. No pneumothorax. Upper Abdomen: Hepatic steatosis. The spleen is not completely included in the  field-of-view but may be enlarged. Bones/soft tissues: Degenerative disc disease. .    Impression  1. No visible acute pulmonary emboli, though several of the segments are  nondiagnostic secondary to respiratory motion. 2.  Multifocal pneumonia which may be bacterial or viral. If viral, is more  advanced given the dense consolidations.   3.  Trace bilateral pleural effusions which are atypical in Covid 19.  4.  Mild lymphadenopathy, likely reactive, also atypical in Covid 19.  5.  Hepatic steatosis. 6.  Incompletely visualized but potential splenomegaly. Labs:    Recent Results (from the past 48 hour(s))   GLUCOSE, POC    Collection Time: 02/01/22  9:30 AM   Result Value Ref Range    Glucose (POC) 317 (H) 70 - 110 mg/dL   GLUCOSE, POC    Collection Time: 02/01/22  1:24 PM   Result Value Ref Range    Glucose (POC) 302 (H) 70 - 110 mg/dL   GLUCOSE, POC    Collection Time: 02/01/22  6:29 PM   Result Value Ref Range    Glucose (POC) 334 (H) 70 - 110 mg/dL   GLUCOSE, POC    Collection Time: 02/01/22 10:37 PM   Result Value Ref Range    Glucose (POC) 407 (HH) 70 - 110 mg/dL   GLUCOSE, POC    Collection Time: 02/01/22 10:39 PM   Result Value Ref Range    Glucose (POC) 412 (HH) 70 - 110 mg/dL   MAGNESIUM    Collection Time: 02/02/22  1:02 AM   Result Value Ref Range    Magnesium 2.3 1.6 - 2.6 mg/dL   METABOLIC PANEL, COMPREHENSIVE    Collection Time: 02/02/22  1:02 AM   Result Value Ref Range    Sodium 133 (L) 136 - 145 mmol/L    Potassium 4.0 3.5 - 5.5 mmol/L    Chloride 99 (L) 100 - 111 mmol/L    CO2 30 21 - 32 mmol/L    Anion gap 4 3.0 - 18 mmol/L    Glucose 369 (H) 74 - 99 mg/dL    BUN 17 7.0 - 18 MG/DL    Creatinine 0.71 0.6 - 1.3 MG/DL    BUN/Creatinine ratio 24 (H) 12 - 20      GFR est AA >60 >60 ml/min/1.73m2    GFR est non-AA >60 >60 ml/min/1.73m2    Calcium 8.5 8.5 - 10.1 MG/DL    Bilirubin, total 0.6 0.2 - 1.0 MG/DL    ALT (SGPT) 95 (H) 13 - 56 U/L    AST (SGOT) 67 (H) 10 - 38 U/L    Alk.  phosphatase 59 45 - 117 U/L    Protein, total 6.8 6.4 - 8.2 g/dL    Albumin 2.8 (L) 3.4 - 5.0 g/dL    Globulin 4.0 2.0 - 4.0 g/dL    A-G Ratio 0.7 (L) 0.8 - 1.7     C REACTIVE PROTEIN, QT    Collection Time: 02/02/22  1:02 AM   Result Value Ref Range    C-Reactive protein 2.3 (H) 0 - 0.3 mg/dL   PROCALCITONIN    Collection Time: 02/02/22  1:02 AM   Result Value Ref Range    Procalcitonin <0.05 ng/mL   D DIMER    Collection Time: 02/02/22  1:02 AM   Result Value Ref Range    D DIMER 0.48 (H) <0.46 ug/ml(FEU)   GLUCOSE, POC    Collection Time: 02/02/22  3:40 AM   Result Value Ref Range    Glucose (POC) 333 (H) 70 - 110 mg/dL   GLUCOSE, POC    Collection Time: 02/02/22  7:45 AM   Result Value Ref Range    Glucose (POC) 313 (H) 70 - 110 mg/dL   GLUCOSE, POC    Collection Time: 02/02/22 11:31 AM   Result Value Ref Range    Glucose (POC) 389 (H) 70 - 110 mg/dL   GLUCOSE, POC    Collection Time: 02/02/22  3:41 PM   Result Value Ref Range    Glucose (POC) 373 (H) 70 - 110 mg/dL   GLUCOSE, POC    Collection Time: 02/02/22  9:22 PM   Result Value Ref Range    Glucose (POC) 358 (H) 70 - 110 mg/dL   C REACTIVE PROTEIN, QT    Collection Time: 02/03/22  2:53 AM   Result Value Ref Range    C-Reactive protein 1.0 (H) 0 - 0.3 mg/dL   PROCALCITONIN    Collection Time: 02/03/22  2:53 AM   Result Value Ref Range    Procalcitonin <0.05 ng/mL   D DIMER    Collection Time: 02/03/22  2:53 AM   Result Value Ref Range    D DIMER 0.52 (H) <0.46 ug/ml(FEU)   CBC WITH AUTOMATED DIFF    Collection Time: 02/03/22  2:53 AM   Result Value Ref Range    WBC 6.7 4.6 - 13.2 K/uL    RBC 4.61 4.20 - 5.30 M/uL    HGB 13.1 12.0 - 16.0 g/dL    HCT 41.9 35.0 - 45.0 %    MCV 90.9 78.0 - 100.0 FL    MCH 28.4 24.0 - 34.0 PG    MCHC 31.3 31.0 - 37.0 g/dL    RDW 13.9 11.6 - 14.5 %    PLATELET 298 351 - 838 K/uL    MPV 9.6 9.2 - 11.8 FL    NRBC 0.0 0  WBC    ABSOLUTE NRBC 0.00 0.00 - 0.01 K/uL    NEUTROPHILS 67 40 - 73 %    BAND NEUTROPHILS 3 0 - 5 %    LYMPHOCYTES 21 21 - 52 %    MONOCYTES 9 3 - 10 %    EOSINOPHILS 0 0 - 5 %    BASOPHILS 0 0 - 2 %    IMMATURE GRANULOCYTES 0 %    ABS. NEUTROPHILS 4.7 1.8 - 8.0 K/UL    ABS. LYMPHOCYTES 1.4 0.9 - 3.6 K/UL    ABS. MONOCYTES 0.6 0.05 - 1.2 K/UL    ABS. EOSINOPHILS 0.0 0.0 - 0.4 K/UL    ABS. BASOPHILS 0.0 0.0 - 0.1 K/UL    ABS. IMM.  GRANS. 0.0 K/UL    DF MANUAL      PLATELET COMMENTS ADEQUATE PLATELETS      RBC COMMENTS NORMOCYTIC, NORMOCHROMIC     METABOLIC PANEL, BASIC    Collection Time: 02/03/22  2:53 AM   Result Value Ref Range    Sodium 134 (L) 136 - 145 mmol/L    Potassium 4.1 3.5 - 5.5 mmol/L    Chloride 99 (L) 100 - 111 mmol/L    CO2 30 21 - 32 mmol/L    Anion gap 5 3.0 - 18 mmol/L    Glucose 352 (H) 74 - 99 mg/dL    BUN 16 7.0 - 18 MG/DL    Creatinine 0.67 0.6 - 1.3 MG/DL    BUN/Creatinine ratio 24 (H) 12 - 20      GFR est AA >60 >60 ml/min/1.73m2    GFR est non-AA >60 >60 ml/min/1.73m2    Calcium 8.6 8.5 - 10.1 MG/DL   PHOSPHORUS    Collection Time: 02/03/22  2:53 AM   Result Value Ref Range    Phosphorus 3.2 2.5 - 4.9 MG/DL   GLUCOSE, POC    Collection Time: 02/03/22  8:32 AM   Result Value Ref Range    Glucose (POC) 331 (H) 70 - 110 mg/dL       Signed By: Teddy Villegas MD     February 3, 2022      I spent 25 minutes with the patient in face-to-face consultation, of which greater than 50% was spent in counseling and coordination of care as described above    Disclaimer: Sections of this note are dictated using utilizing voice recognition software. Minor typographical errors may be present. If questions arise, please do not hesitate to contact me or call our department.

## 2022-02-04 LAB
CRP SERPL-MCNC: 0.5 MG/DL (ref 0–0.3)
D DIMER PPP FEU-MCNC: 0.57 UG/ML(FEU)
GLUCOSE BLD STRIP.AUTO-MCNC: 312 MG/DL (ref 70–110)
GLUCOSE BLD STRIP.AUTO-MCNC: 314 MG/DL (ref 70–110)
GLUCOSE BLD STRIP.AUTO-MCNC: 349 MG/DL (ref 70–110)
GLUCOSE BLD STRIP.AUTO-MCNC: 364 MG/DL (ref 70–110)
GLUCOSE BLD STRIP.AUTO-MCNC: 413 MG/DL (ref 70–110)
PROCALCITONIN SERPL-MCNC: <0.05 NG/ML

## 2022-02-04 PROCEDURE — 74011250637 HC RX REV CODE- 250/637: Performed by: INTERNAL MEDICINE

## 2022-02-04 PROCEDURE — 77030027138 HC INCENT SPIROMETER -A

## 2022-02-04 PROCEDURE — 94640 AIRWAY INHALATION TREATMENT: CPT

## 2022-02-04 PROCEDURE — 82962 GLUCOSE BLOOD TEST: CPT

## 2022-02-04 PROCEDURE — 36415 COLL VENOUS BLD VENIPUNCTURE: CPT

## 2022-02-04 PROCEDURE — 74011250636 HC RX REV CODE- 250/636: Performed by: HOSPITALIST

## 2022-02-04 PROCEDURE — 74011636637 HC RX REV CODE- 636/637: Performed by: HOSPITALIST

## 2022-02-04 PROCEDURE — 74011250637 HC RX REV CODE- 250/637: Performed by: STUDENT IN AN ORGANIZED HEALTH CARE EDUCATION/TRAINING PROGRAM

## 2022-02-04 PROCEDURE — 74011000250 HC RX REV CODE- 250: Performed by: PHYSICIAN ASSISTANT

## 2022-02-04 PROCEDURE — 86140 C-REACTIVE PROTEIN: CPT

## 2022-02-04 PROCEDURE — 65660000000 HC RM CCU STEPDOWN

## 2022-02-04 PROCEDURE — 85379 FIBRIN DEGRADATION QUANT: CPT

## 2022-02-04 PROCEDURE — 99232 SBSQ HOSP IP/OBS MODERATE 35: CPT | Performed by: HOSPITALIST

## 2022-02-04 PROCEDURE — 74011250636 HC RX REV CODE- 250/636: Performed by: INTERNAL MEDICINE

## 2022-02-04 PROCEDURE — 2709999900 HC NON-CHARGEABLE SUPPLY

## 2022-02-04 PROCEDURE — 77010033678 HC OXYGEN DAILY

## 2022-02-04 PROCEDURE — 84145 PROCALCITONIN (PCT): CPT

## 2022-02-04 RX ORDER — FUROSEMIDE 20 MG/1
20 TABLET ORAL DAILY
Status: DISCONTINUED | OUTPATIENT
Start: 2022-02-05 | End: 2022-02-08 | Stop reason: HOSPADM

## 2022-02-04 RX ORDER — INSULIN GLARGINE 100 [IU]/ML
45 INJECTION, SOLUTION SUBCUTANEOUS EVERY 12 HOURS
Status: DISCONTINUED | OUTPATIENT
Start: 2022-02-04 | End: 2022-02-08 | Stop reason: HOSPADM

## 2022-02-04 RX ORDER — HYDRALAZINE HYDROCHLORIDE 25 MG/1
25 TABLET, FILM COATED ORAL ONCE
Status: COMPLETED | OUTPATIENT
Start: 2022-02-04 | End: 2022-02-04

## 2022-02-04 RX ADMIN — INSULIN LISPRO 12 UNITS: 100 INJECTION, SOLUTION INTRAVENOUS; SUBCUTANEOUS at 16:00

## 2022-02-04 RX ADMIN — INSULIN LISPRO 12 UNITS: 100 INJECTION, SOLUTION INTRAVENOUS; SUBCUTANEOUS at 08:38

## 2022-02-04 RX ADMIN — BUDESONIDE AND FORMOTEROL FUMARATE DIHYDRATE 2 PUFF: 160; 4.5 AEROSOL RESPIRATORY (INHALATION) at 20:31

## 2022-02-04 RX ADMIN — SODIUM CHLORIDE, PRESERVATIVE FREE 10 ML: 5 INJECTION INTRAVENOUS at 21:55

## 2022-02-04 RX ADMIN — BUDESONIDE AND FORMOTEROL FUMARATE DIHYDRATE 2 PUFF: 160; 4.5 AEROSOL RESPIRATORY (INHALATION) at 08:45

## 2022-02-04 RX ADMIN — IPRATROPIUM BROMIDE AND ALBUTEROL 1 PUFF: 20; 100 SPRAY, METERED RESPIRATORY (INHALATION) at 12:18

## 2022-02-04 RX ADMIN — IPRATROPIUM BROMIDE AND ALBUTEROL 1 PUFF: 20; 100 SPRAY, METERED RESPIRATORY (INHALATION) at 08:45

## 2022-02-04 RX ADMIN — INSULIN LISPRO 12 UNITS: 100 INJECTION, SOLUTION INTRAVENOUS; SUBCUTANEOUS at 12:16

## 2022-02-04 RX ADMIN — Medication 12 UNITS: at 08:39

## 2022-02-04 RX ADMIN — DULOXETINE 30 MG: 30 CAPSULE, DELAYED RELEASE ORAL at 08:36

## 2022-02-04 RX ADMIN — ENOXAPARIN SODIUM 40 MG: 100 INJECTION SUBCUTANEOUS at 16:52

## 2022-02-04 RX ADMIN — GABAPENTIN 100 MG: 100 CAPSULE ORAL at 16:52

## 2022-02-04 RX ADMIN — OMEGA-3-ACID ETHYL ESTERS 1000 MG: 1 CAPSULE, LIQUID FILLED ORAL at 16:52

## 2022-02-04 RX ADMIN — INSULIN GLARGINE 45 UNITS: 100 INJECTION, SOLUTION SUBCUTANEOUS at 21:55

## 2022-02-04 RX ADMIN — OMEGA-3-ACID ETHYL ESTERS 1000 MG: 1 CAPSULE, LIQUID FILLED ORAL at 08:36

## 2022-02-04 RX ADMIN — GABAPENTIN 100 MG: 100 CAPSULE ORAL at 21:54

## 2022-02-04 RX ADMIN — IPRATROPIUM BROMIDE AND ALBUTEROL 1 PUFF: 20; 100 SPRAY, METERED RESPIRATORY (INHALATION) at 00:56

## 2022-02-04 RX ADMIN — Medication 15 UNITS: at 12:14

## 2022-02-04 RX ADMIN — Medication 12 UNITS: at 16:00

## 2022-02-04 RX ADMIN — IPRATROPIUM BROMIDE AND ALBUTEROL 1 PUFF: 20; 100 SPRAY, METERED RESPIRATORY (INHALATION) at 20:31

## 2022-02-04 RX ADMIN — ACETAMINOPHEN 500 MG: 500 TABLET ORAL at 02:54

## 2022-02-04 RX ADMIN — HYDRALAZINE HYDROCHLORIDE 25 MG: 25 TABLET, FILM COATED ORAL at 02:51

## 2022-02-04 RX ADMIN — IPRATROPIUM BROMIDE AND ALBUTEROL 1 PUFF: 20; 100 SPRAY, METERED RESPIRATORY (INHALATION) at 17:06

## 2022-02-04 RX ADMIN — GABAPENTIN 100 MG: 100 CAPSULE ORAL at 08:36

## 2022-02-04 RX ADMIN — DEXAMETHASONE SODIUM PHOSPHATE 10 MG: 4 INJECTION, SOLUTION INTRAMUSCULAR; INTRAVENOUS at 08:36

## 2022-02-04 RX ADMIN — Medication 12 UNITS: at 21:56

## 2022-02-04 RX ADMIN — INSULIN GLARGINE 37 UNITS: 100 INJECTION, SOLUTION SUBCUTANEOUS at 08:37

## 2022-02-04 NOTE — DIABETES MGMT
Diabetes Plan of Care    BG remain above target   Insulin doses increasing in setting of new dx. T2DM with A1c 12%, COVID pneumonia, steroids, super morbid obesity  Receiving basal every 12 hours, mealtime and corrective humalog  Note that IV steroids have been lowered to daily starting today - dexamethasone 10 mg daily  Patient has received insulin education, glucometer and DM meal planning education. Printed materials have been provided   She is self-administering her insulin here   lantus advanced by MD to 45 units every 12 hours   Will continue to monitor               Your A1C was 12% for average  mg/dl  Lab Results   Component Value Date/Time    Hemoglobin A1c 12.0 (H) 01/31/2022 03:13 AM     Current insulin orders:   Basal lantus insulin 45 units every 12 hours  Mealtime bolus lispro 12 units TID AC  Correctional lispro insulin.  Very resistant dose per protocol    Total Daily Dose previous 24 hours: 157 units   Lantus: 67 units  Lispro: 36 units mealtime, 54 units corrective      Chadwick Runner MPH RN Joanne Borrego  Pager [phone]  Office [phone]

## 2022-02-04 NOTE — PROGRESS NOTES
[facility] Hospitalist Group  Progress Note    Patient: Remy Marx Age: 23 y.o. : 1995 MR#: [id number] SSN: [id number]  Date/Time: 2022     Subjective:     Accu-Chek 349 - 413. Spoke with patient over the phone , states \"I'm diong good, how are you? \" she was not on insulin prior to admission. As teenager, dx with prediabetes, was on metformin at that time. Since then she has not been diagnosed w/ diabetes. Last blood work was , no mention of diabetes. States her  brought her fruits as a snack, which she discarded as she thought it would make her sugar go higher. Also neurologist told her that \"thyroid was high,\" close follow-up with PCP recommended. Denies dysphagia, odynophagia. deneis chest pain, except with cough. Cough is \"not as bad as it was,\" \"at first it was hard to cough it up,\" sputum is now easier to expectorate, now white / yellow. When she ambulates in room, \"I feel good. \" she has been up in chair all morning. For lunch she had soup, getting ready to eat salad. Asks if she will need oxygen at discharge. Around 1 PM, patient seen and examined at bedside. She states she has poor vision and is unable to use vials for insulin, she requests pen. States she cannot afford glasses. Also she was given Lasix from recent ED visit at [facility], states this helped her. She is presently on 4 L oxygen. Assessment/Plan:     1. COVID-19 pneumonia-continue IV Decadron. s/p Tocilizumab. antibiotics completed. Monitor inflammatory markers. pulmonology follows. Needs repeat imaging in 6 weeks. 2. Acute hypoxic respiratory failure secondary to COVID-19 pneumonia-continue supplemental oxygen on 4 L. Pulmonology follows. 3. History of neuropathy-continue Neurontin and Cymbalta. 4. DM2 -A1c 12, continue Lantus and insulin sliding scale, diabetic education on board. Will need insulin at discharge.   Outpatient follow-up with nutritionist.  5. Super morbid obesity Body mass index is 75.53 kg/m². 6. Poor vision. Outpatient follow-up with [facility]. 7. Lower extremity edema. Lasix  8. Tobacco use disorder. Smoking cessation education  9. 9.  Elevated TSH, 6.28 10/29/2021. Check TFTs tomorrow morning  DVT prophylaxis-Lovenox dose increased. Full code. Assess home oxygen needs prior to discharge. Discussed on IDT.  updated over phone 3:45 PM, extensive discussion held, all questions answered to the best my ability. Warren Jacobs MD  22      Case discussed with:  [x]Patient  [x]Family  [x]Nursing  [x]Case Management  DVT Prophylaxis:  [x]Lovenox  []Hep SQ  []SCDs  []Coumadin   []On Heparin gtt    Objective:   VS:   Visit Vitals  /73 (BP 1 Location: Right upper arm, BP Patient Position: Sitting)   Pulse (!) 57   Temp 97.4 °F (36.3 °C)   Resp 20   Ht 5' 4\" (1.626 m)   Wt (!) 199.6 kg (440 lb)   SpO2 97%   Breastfeeding No   BMI 75.53 kg/m²      Tmax/24hrs: Temp (24hrs), Av.6 °F (36.4 °C), Min:97.4 °F (36.3 °C), Max:97.9 °F (36.6 °C)  IOBRIEF  No intake or output data in the 24 hours ending 22 1214    General:  Alert, cooperative, no acute distress  . Found sitting up in chair, speaking full sentences on supplemental oxygen. HEENT: PERRLA, anicteric sclerae. Pulmonary: Decreased breath sounds at bases  Cardiovascular: Regular rate and Rhythm. GI:  Soft, Non distended, Non tender. Nabs  Extremities: Lower extremity edema bilaterally. Neurologic: Alert and oriented X 4. No acute neuro deficits. Additional: No rash to visible skin.     Medications:   Current Facility-Administered Medications   Medication Dose Route Frequency    insulin glargine (LANTUS) injection 37 Units  37 Units SubCUTAneous Q12H    insulin lispro (HUMALOG) injection 12 Units  12 Units SubCUTAneous TIDAC    enoxaparin (LOVENOX) injection 40 mg  40 mg SubCUTAneous Q12H    dexamethasone (DECADRON) 4 mg/mL injection 10 mg  10 mg IntraVENous Q24H    ipratropium-albuterol (COMBIVENT RESPIMAT) 20 mcg-100 mcg inhalation spray  1 Puff Inhalation Q4H RT    budesonide-formoteroL (SYMBICORT) 160-4.5 mcg/actuation HFA inhaler 2 Puff  2 Puff Inhalation BID RT    sodium chloride (NS) flush 5-10 mL  5-10 mL IntraVENous PRN    insulin lispro (HUMALOG) injection   SubCUTAneous AC&HS    glucose chewable tablet 16 g  16 g Oral PRN    glucagon (GLUCAGEN) injection 1 mg  1 mg IntraMUSCular PRN    dextrose 10% infusion 125-250 mL  125-250 mL IntraVENous PRN    acetaminophen (TYLENOL) tablet 500 mg  500 mg Oral Q6H PRN    ondansetron (ZOFRAN) injection 4 mg  4 mg IntraVENous Q4H PRN    DULoxetine (CYMBALTA) capsule 30 mg  30 mg Oral DAILY    gabapentin (NEURONTIN) capsule 100 mg  100 mg Oral TID    omega-3 acid ethyl esters (LOVAZA) capsule 1,000 mg  1 g Oral BID WITH MEALS    hydrALAZINE (APRESOLINE) tablet 25 mg  25 mg Oral TID PRN       Imaging:   XR Results (most recent):  Results from Hospital Encounter encounter on 01/30/22    XR CHEST PORT    Narrative  EXAM: XR CHEST PORT    INDICATION: 26 years Female. cough. ADDITIONAL HISTORY: None. TECHNIQUE: Frontal view of the chest.    COMPARISON: 7/19/2021    FINDINGS:    Underpenetration of the lung bases. Obscuration of the left heart border and  left hemidiaphragm. The cardiac silhouette appears similar to prior. Dense opacity is at the left  lung base, blunting of the left costophrenic sulcus. Hazy opacity at the right  lung base. Diffuse bilateral interstitial opacities within the left greater than  right lungs. Additional airspace opacity at the peripheral aspect of the left  mid/upper lung zone. The right costophrenic sulcus is sharp. No definite pneumothorax. No acute osseous abnormality appreciated. Impression  1. Multifocal patchy airspace opacities involving the left greater than right  lung bases as well as the left midlung zone.  Background of diffuse bilateral  interstitial opacities. Findings are most suspicious for multifocal pneumonia,  possible Covid 19 pneumonia; although other infectious or inflammatory process  is possible. Recommend radiographic follow-up within 6-8 weeks after appropriate  medical therapy and imaging follow-up until clearance. 2.  Possible left pleural effusion. CT Results (most recent):  Results from [place] encounter on 01/30/22    CTA CHEST W OR W WO CONT    Narrative  CTA CHEST PULMONARY EMBOLISM PROTOCOL    INDICATION: Morbid obesity, asthma with shortness of breath, wheezing, diarrhea,  cough, headache, myalgias, decreased appetite, loss of taste and smell, nausea,  vomiting, question of Covid. Question pulmonary embolism. TECHNIQUE: Thin collimation axial images obtained through the level of the  pulmonary arteries with additional imaging through the chest following the  uneventful administration of intravenous contrast.  Images reconstructed into  three dimensional coronal and sagittal projections for complete evaluation of  the tortuous and overlapping pulmonary vascular structures and to reduce patient  radiation dose. All CT scans at this facility are performed using dose optimization technique as  appropriate to a performed exam, to include automated exposure control,  adjustment of the mA and/or kV according to patient size (including appropriate  matching first site-specific examinations), or use of iterative reconstruction  technique. COMPARISON: 2/1/2021    FINDINGS: Evaluation limited by body habitus. There is suboptimal contrast density to assess for chronic filling defects and  respiratory motion also limits assessment. No visible pulmonary emboli, though  many of the segments are nondiagnostic. Thyroid: Unremarkable in its visualized aspects. Pericardium/ Heart: No significant effusion. Aorta/ Vessels: No aneurysm or dissection. Lymph Nodes: 1.1 cm left periaortic node.  1.2 cm subcarinal node. 1.4 cm right  hilar node. .    Lungs: There are bilateral dense lung consolidations, left greater than right. The majority of the left lung is affected. Large consolidation in the right  middle lobe. Pleura: Trace bilateral pleural effusions. No pneumothorax. Upper Abdomen: Hepatic steatosis. The spleen is not completely included in the  field-of-view but may be enlarged. Bones/soft tissues: Degenerative disc disease. .    Impression  1. No visible acute pulmonary emboli, though several of the segments are  nondiagnostic secondary to respiratory motion. 2.  Multifocal pneumonia which may be bacterial or viral. If viral, is more  advanced given the dense consolidations. 3.  Trace bilateral pleural effusions which are atypical in Covid 19.  4.  Mild lymphadenopathy, likely reactive, also atypical in Covid 19.  5.  Hepatic steatosis. 6.  Incompletely visualized but potential splenomegaly.            Labs:    Recent Results (from the past 48 hour(s))   GLUCOSE, POC    Collection Time: 02/02/22  3:41 PM   Result Value Ref Range    Glucose (POC) 373 (H) 70 - 110 mg/dL   GLUCOSE, POC    Collection Time: 02/02/22  9:22 PM   Result Value Ref Range    Glucose (POC) 358 (H) 70 - 110 mg/dL   C REACTIVE PROTEIN, QT    Collection Time: 02/03/22  2:53 AM   Result Value Ref Range    C-Reactive protein 1.0 (H) 0 - 0.3 mg/dL   PROCALCITONIN    Collection Time: 02/03/22  2:53 AM   Result Value Ref Range    Procalcitonin <0.05 ng/mL   D DIMER    Collection Time: 02/03/22  2:53 AM   Result Value Ref Range    D DIMER 0.52 (H) <0.46 ug/ml(FEU)   CBC WITH AUTOMATED DIFF    Collection Time: 02/03/22  2:53 AM   Result Value Ref Range    WBC 6.7 4.6 - 13.2 K/uL    RBC 4.61 4.20 - 5.30 M/uL    HGB 13.1 12.0 - 16.0 g/dL    HCT 41.9 35.0 - 45.0 %    MCV 90.9 78.0 - 100.0 FL    MCH 28.4 24.0 - 34.0 PG    MCHC 31.3 31.0 - 37.0 g/dL    RDW 13.9 11.6 - 14.5 %    PLATELET 095 247 - 658 K/uL    MPV 9.6 9.2 - 11.8 FL    NRBC 0.0 0  WBC    ABSOLUTE NRBC 0.00 0.00 - 0.01 K/uL    NEUTROPHILS 67 40 - 73 %    BAND NEUTROPHILS 3 0 - 5 %    LYMPHOCYTES 21 21 - 52 %    MONOCYTES 9 3 - 10 %    EOSINOPHILS 0 0 - 5 %    BASOPHILS 0 0 - 2 %    IMMATURE GRANULOCYTES 0 %    ABS. NEUTROPHILS 4.7 1.8 - 8.0 K/UL    ABS. LYMPHOCYTES 1.4 0.9 - 3.6 K/UL    ABS. MONOCYTES 0.6 0.05 - 1.2 K/UL    ABS. EOSINOPHILS 0.0 0.0 - 0.4 K/UL    ABS. BASOPHILS 0.0 0.0 - 0.1 K/UL    ABS. IMM.  GRANS. 0.0 K/UL    DF MANUAL      PLATELET COMMENTS ADEQUATE PLATELETS      RBC COMMENTS NORMOCYTIC, NORMOCHROMIC     METABOLIC PANEL, BASIC    Collection Time: 02/03/22  2:53 AM   Result Value Ref Range    Sodium 134 (L) 136 - 145 mmol/L    Potassium 4.1 3.5 - 5.5 mmol/L    Chloride 99 (L) 100 - 111 mmol/L    CO2 30 21 - 32 mmol/L    Anion gap 5 3.0 - 18 mmol/L    Glucose 352 (H) 74 - 99 mg/dL    BUN 16 7.0 - 18 MG/DL    Creatinine 0.67 0.6 - 1.3 MG/DL    BUN/Creatinine ratio 24 (H) 12 - 20      GFR est AA >60 >60 ml/min/1.73m2    GFR est non-AA >60 >60 ml/min/1.73m2    Calcium 8.6 8.5 - 10.1 MG/DL   PHOSPHORUS    Collection Time: 02/03/22  2:53 AM   Result Value Ref Range    Phosphorus 3.2 2.5 - 4.9 MG/DL   GLUCOSE, POC    Collection Time: 02/03/22  8:32 AM   Result Value Ref Range    Glucose (POC) 331 (H) 70 - 110 mg/dL   GLUCOSE, POC    Collection Time: 02/03/22 11:40 AM   Result Value Ref Range    Glucose (POC) 318 (H) 70 - 110 mg/dL   GLUCOSE, POC    Collection Time: 02/03/22  4:42 PM   Result Value Ref Range    Glucose (POC) 357 (H) 70 - 110 mg/dL   GLUCOSE, POC    Collection Time: 02/03/22 10:27 PM   Result Value Ref Range    Glucose (POC) 353 (H) 70 - 110 mg/dL   GLUCOSE, POC    Collection Time: 02/03/22 10:28 PM   Result Value Ref Range    Glucose (POC) 359 (H) 70 - 110 mg/dL   C REACTIVE PROTEIN, QT    Collection Time: 02/04/22 12:48 AM   Result Value Ref Range    C-Reactive protein 0.5 (H) 0 - 0.3 mg/dL   PROCALCITONIN    Collection Time: 02/04/22 12:48 AM Result Value Ref Range    Procalcitonin <0.05 ng/mL   D DIMER    Collection Time: 02/04/22 12:48 AM   Result Value Ref Range    D DIMER 0.57 (H) <0.46 ug/ml(FEU)   GLUCOSE, POC    Collection Time: 02/04/22  7:50 AM   Result Value Ref Range    Glucose (POC) 413 (HH) 70 - 110 mg/dL   GLUCOSE, POC    Collection Time: 02/04/22  7:53 AM   Result Value Ref Range    Glucose (POC) 349 (H) 70 - 110 mg/dL       Signed By: Milagros Stern MD     February 4, 2022      I spent 25 minutes with the patient in face-to-face consultation, of which greater than 50% was spent in counseling and coordination of care as described above    Disclaimer: Sections of this note are dictated using utilizing voice recognition software. Minor typographical errors may be present. If questions arise, please do not hesitate to contact me or call our department.

## 2022-02-04 NOTE — PROGRESS NOTES
Bedside and Verbal shift change report given to [employer] (oncoming nurse) by Roro Pitt RN (offgoing nurse). Report included the following information SBAR, Kardex, Intake/Output, MAR and Recent Results.

## 2022-02-04 NOTE — PROGRESS NOTES
Reason for Admission:  Acute respiratory failure with hypoxia ([facility] [address] [J96.01]  Hypoxia [R09.02]  Pneumonia due to COVID-19 virus [U07.1, J12.82]                 RUR Score:    6            Plan for utilizing home health:    n/a                      Likelihood of Readmission:   LOW                         Transition of Care Plan:              Initial assessment completed with patient. Cognitive status of patient: oriented to time, place, person and situation. Face sheet information confirmed:  yes. The patient designates spouse to participate in her discharge plan and to receive any needed information. This patient lives in a single family home with spouse. Patient is able to navigate steps as needed. Prior to hospitalization, patient was considered to be independent with ADLs/IADLS : yes . Patient has a current ACP document on file: no        The spouse will be available to transport patient home upon discharge. The patient already has none reported, and  medical equipment available in the home. Patient is not currently active with home health. Patient has not stayed in a skilled nursing facility or rehab. Was  stay within last 60 days : no. This patient is on dialysis :no       Currently, the discharge plan is Home. The patient states that she can obtain her medications from the pharmacy, and take her medications as directed. Patient's current insurance is [facility]        Care Management Interventions  PCP Verified by CM:  Yes  Mode of Transport at Discharge: Self  Transition of Care Consult (CM Consult): Discharge Planning  Support Systems: Spouse/Significant Other  Confirm Follow Up Transport: Self  The Plan for Transition of Care is Related to the Following Treatment Goals : home  Discharge Location  Patient Expects to be Discharged to[de-identified] Home

## 2022-02-05 LAB
BACTERIA SPEC CULT: NORMAL
CRP SERPL-MCNC: <0.3 MG/DL (ref 0–0.3)
D DIMER PPP FEU-MCNC: 0.48 UG/ML(FEU)
GLUCOSE BLD STRIP.AUTO-MCNC: 165 MG/DL (ref 70–110)
GLUCOSE BLD STRIP.AUTO-MCNC: 192 MG/DL (ref 70–110)
GLUCOSE BLD STRIP.AUTO-MCNC: 232 MG/DL (ref 70–110)
GLUCOSE BLD STRIP.AUTO-MCNC: 307 MG/DL (ref 70–110)
PROCALCITONIN SERPL-MCNC: <0.05 NG/ML
SERVICE CMNT-IMP: NORMAL
T4 FREE SERPL-MCNC: 0.7 NG/DL (ref 0.7–1.5)
TSH SERPL DL<=0.05 MIU/L-ACNC: 3.98 UIU/ML (ref 0.36–3.74)

## 2022-02-05 PROCEDURE — 86140 C-REACTIVE PROTEIN: CPT

## 2022-02-05 PROCEDURE — 84439 ASSAY OF FREE THYROXINE: CPT

## 2022-02-05 PROCEDURE — 84443 ASSAY THYROID STIM HORMONE: CPT

## 2022-02-05 PROCEDURE — 84480 ASSAY TRIIODOTHYRONINE (T3): CPT

## 2022-02-05 PROCEDURE — 74011000250 HC RX REV CODE- 250: Performed by: SURGERY

## 2022-02-05 PROCEDURE — 99223 1ST HOSP IP/OBS HIGH 75: CPT | Performed by: SURGERY

## 2022-02-05 PROCEDURE — 99233 SBSQ HOSP IP/OBS HIGH 50: CPT | Performed by: HOSPITALIST

## 2022-02-05 PROCEDURE — 87205 SMEAR GRAM STAIN: CPT

## 2022-02-05 PROCEDURE — 94640 AIRWAY INHALATION TREATMENT: CPT

## 2022-02-05 PROCEDURE — 74011250636 HC RX REV CODE- 250/636: Performed by: SURGERY

## 2022-02-05 PROCEDURE — 74011250637 HC RX REV CODE- 250/637: Performed by: INTERNAL MEDICINE

## 2022-02-05 PROCEDURE — 36415 COLL VENOUS BLD VENIPUNCTURE: CPT

## 2022-02-05 PROCEDURE — 84145 PROCALCITONIN (PCT): CPT

## 2022-02-05 PROCEDURE — 74011636637 HC RX REV CODE- 636/637: Performed by: HOSPITALIST

## 2022-02-05 PROCEDURE — 74011250636 HC RX REV CODE- 250/636: Performed by: HOSPITALIST

## 2022-02-05 PROCEDURE — 82962 GLUCOSE BLOOD TEST: CPT

## 2022-02-05 PROCEDURE — 74011250637 HC RX REV CODE- 250/637: Performed by: HOSPITALIST

## 2022-02-05 PROCEDURE — 65660000000 HC RM CCU STEPDOWN

## 2022-02-05 PROCEDURE — 74011250636 HC RX REV CODE- 250/636: Performed by: INTERNAL MEDICINE

## 2022-02-05 PROCEDURE — 85379 FIBRIN DEGRADATION QUANT: CPT

## 2022-02-05 RX ORDER — VANCOMYCIN/0.9 % SOD CHLORIDE 1.5G/250ML
1500 PLASTIC BAG, INJECTION (ML) INTRAVENOUS EVERY 8 HOURS
Status: DISCONTINUED | OUTPATIENT
Start: 2022-02-06 | End: 2022-02-06

## 2022-02-05 RX ORDER — VANCOMYCIN 2 GRAM/500 ML IN 0.9 % SODIUM CHLORIDE INTRAVENOUS
2000 ONCE
Status: DISPENSED | OUTPATIENT
Start: 2022-02-05 | End: 2022-02-06

## 2022-02-05 RX ORDER — NYSTATIN 100000 [USP'U]/G
POWDER TOPICAL 2 TIMES DAILY
Status: DISCONTINUED | OUTPATIENT
Start: 2022-02-05 | End: 2022-02-08 | Stop reason: HOSPADM

## 2022-02-05 RX ADMIN — IPRATROPIUM BROMIDE AND ALBUTEROL 1 PUFF: 20; 100 SPRAY, METERED RESPIRATORY (INHALATION) at 07:30

## 2022-02-05 RX ADMIN — Medication 6 UNITS: at 15:44

## 2022-02-05 RX ADMIN — FUROSEMIDE 20 MG: 20 TABLET ORAL at 09:56

## 2022-02-05 RX ADMIN — INSULIN LISPRO 12 UNITS: 100 INJECTION, SOLUTION INTRAVENOUS; SUBCUTANEOUS at 08:02

## 2022-02-05 RX ADMIN — INSULIN LISPRO 12 UNITS: 100 INJECTION, SOLUTION INTRAVENOUS; SUBCUTANEOUS at 15:45

## 2022-02-05 RX ADMIN — IPRATROPIUM BROMIDE AND ALBUTEROL 1 PUFF: 20; 100 SPRAY, METERED RESPIRATORY (INHALATION) at 02:08

## 2022-02-05 RX ADMIN — INSULIN GLARGINE 45 UNITS: 100 INJECTION, SOLUTION SUBCUTANEOUS at 08:00

## 2022-02-05 RX ADMIN — OMEGA-3-ACID ETHYL ESTERS 1000 MG: 1 CAPSULE, LIQUID FILLED ORAL at 18:40

## 2022-02-05 RX ADMIN — ACETAMINOPHEN 500 MG: 500 TABLET ORAL at 06:06

## 2022-02-05 RX ADMIN — ENOXAPARIN SODIUM 40 MG: 100 INJECTION SUBCUTANEOUS at 18:40

## 2022-02-05 RX ADMIN — GABAPENTIN 100 MG: 100 CAPSULE ORAL at 18:40

## 2022-02-05 RX ADMIN — GABAPENTIN 100 MG: 100 CAPSULE ORAL at 22:00

## 2022-02-05 RX ADMIN — WATER 3 G: 1 INJECTION INTRAMUSCULAR; INTRAVENOUS; SUBCUTANEOUS at 18:30

## 2022-02-05 RX ADMIN — BUDESONIDE AND FORMOTEROL FUMARATE DIHYDRATE 2 PUFF: 160; 4.5 AEROSOL RESPIRATORY (INHALATION) at 07:30

## 2022-02-05 RX ADMIN — DEXAMETHASONE SODIUM PHOSPHATE 10 MG: 4 INJECTION, SOLUTION INTRAMUSCULAR; INTRAVENOUS at 09:56

## 2022-02-05 RX ADMIN — DULOXETINE 30 MG: 30 CAPSULE, DELAYED RELEASE ORAL at 09:56

## 2022-02-05 RX ADMIN — INSULIN GLARGINE 45 UNITS: 100 INJECTION, SOLUTION SUBCUTANEOUS at 21:00

## 2022-02-05 RX ADMIN — ENOXAPARIN SODIUM 40 MG: 100 INJECTION SUBCUTANEOUS at 05:50

## 2022-02-05 RX ADMIN — IPRATROPIUM BROMIDE AND ALBUTEROL 1 PUFF: 20; 100 SPRAY, METERED RESPIRATORY (INHALATION) at 18:44

## 2022-02-05 RX ADMIN — Medication 3 UNITS: at 08:01

## 2022-02-05 RX ADMIN — GABAPENTIN 100 MG: 100 CAPSULE ORAL at 09:56

## 2022-02-05 RX ADMIN — OMEGA-3-ACID ETHYL ESTERS 1000 MG: 1 CAPSULE, LIQUID FILLED ORAL at 09:57

## 2022-02-05 NOTE — PROGRESS NOTES
[facility] Hospitalist Group  Progress Note    Patient: Tootie Hooks Age: 23 y.o. : 1995 MR#: [id number] SSN: [id number]  Date/Time: 2022     Subjective:     Accu-Chek 192 - 364. \"I'm doing good. \" states she just took a nap. Cough is productive of white mucous. For breaskfast she had eggs, potatoes , fruit. States she did not inject insulin this am. Yesterday she did not inject her own insulin . Nursing confirms patient has injected her own insulin several times during this admission. Nursing also expresses concern regarding abscess under her left breast, which started draining profusely overnight. Also nursing noticed a boil to right anterior thigh, which popped and started draining this morning. Patient seen and examined at bedside with nursing Colt Bryson present, states she forgot to tell me about drainage from under left breast.  She is uncertain when it started, she thought it was just a sore. Also she is uncertain when boil to her right thigh started. She has an area of mild induration to her left anterior calf, for which she has been evaluated in [facility] ED, and prescribed Lasix. States it has not been worsening. Assessment/Plan:     1. COVID-19 pneumonia-continue IV Decadron. s/p Tocilizumab. antibiotics completed. Monitor inflammatory markers. pulmonology follows. Needs repeat imaging in 6 weeks. 2. Acute hypoxic respiratory failure secondary to COVID-19 pneumonia-continue supplemental oxygen on 4 L. Pulmonology follows. 3. History of neuropathy-continue Neurontin and Cymbalta. 4. DM2 -A1c 12, continue Lantus and insulin sliding scale, diabetic education on board. Will need insulin at discharge. Outpatient follow-up with nutritionist.  5. Super morbid obesity Body mass index is 75.53 kg/m². 6. Poor vision. Outpatient follow-up with [facility]. 7. Lower extremity edema. Lasix  8. Tobacco use disorder. Smoking cessation education  9.  Elevated TSH, 3.98, normal T4. Repeat TFTs 4 to 6 weeks in the outpatient setting. 10. Not immunized for covid. 11. Abscess under left breast - wound cx. Surgery consult. Call out to ID regarding abx. 12. Boil to anterior thigh. Surgery to eval.   DVT prophylaxis-Lovenox, increased dose. Full code. Assess home oxygen needs prior to discharge. I discussed the case with Dr. Katya Sheth via [employer]. I discussed case with Dr. Sabino Grissom. Derrick Del Valle. Discussed on IDT. Warren Jacobs MD  22      Case discussed with:  [x]Patient  [x]Family  [x]Nursing  [x]Case Management  DVT Prophylaxis:  [x]Lovenox  []Hep SQ  []SCDs  []Coumadin   []On Heparin gtt    Objective:   VS:   Visit Vitals  /64 (BP 1 Location: Right upper arm, BP Patient Position: At rest)   Pulse 72   Temp 97.6 °F (36.4 °C)   Resp 18   Ht 5' 4\" (1.626 m)   Wt (!) 199.6 kg (440 lb)   SpO2 98%   Breastfeeding No   BMI 75.53 kg/m²      Tmax/24hrs: Temp (24hrs), Av.8 °F (36.6 °C), Min:97.3 °F (36.3 °C), Max:98.1 °F (36.7 °C)  IOBRIEF  No intake or output data in the 24 hours ending 22 1037    General:  Alert, cooperative, no acute distress  . Found lying in bed, speaking full sentences on supplemental oxygen. HEENT: PERRLA, anicteric sclerae. Pulmonary: Decreased breath sounds at bases  Cardiovascular: Regular rate and Rhythm. GI:  Soft, Non distended, Non tender. Nabs  Extremities: Lower extremity edema bilaterally. Neurologic: Alert and oriented X 4. No acute neuro deficits. Skin: 3 cm area of erythema to right upper anterior thigh, rosemarie pus expressed. Copious drainage of rosemarie pus from under left breast, with surrounding erythema, induration, warmth.     Medications:   Current Facility-Administered Medications   Medication Dose Route Frequency    nystatin (MYCOSTATIN) 100,000 unit/gram powder   Topical BID    insulin glargine (LANTUS) injection 45 Units  45 Units SubCUTAneous Q12H    furosemide (LASIX) tablet 20 mg  20 mg Oral DAILY    ipratropium-albuterol (COMBIVENT RESPIMAT) 20 mcg-100 mcg inhalation spray  1 Puff Inhalation Q6H RT    insulin lispro (HUMALOG) injection 12 Units  12 Units SubCUTAneous TIDAC    enoxaparin (LOVENOX) injection 40 mg  40 mg SubCUTAneous Q12H    dexamethasone (DECADRON) 4 mg/mL injection 10 mg  10 mg IntraVENous Q24H    budesonide-formoteroL (SYMBICORT) 160-4.5 mcg/actuation HFA inhaler 2 Puff  2 Puff Inhalation BID RT    sodium chloride (NS) flush 5-10 mL  5-10 mL IntraVENous PRN    insulin lispro (HUMALOG) injection   SubCUTAneous AC&HS    glucose chewable tablet 16 g  16 g Oral PRN    glucagon (GLUCAGEN) injection 1 mg  1 mg IntraMUSCular PRN    dextrose 10% infusion 125-250 mL  125-250 mL IntraVENous PRN    acetaminophen (TYLENOL) tablet 500 mg  500 mg Oral Q6H PRN    ondansetron (ZOFRAN) injection 4 mg  4 mg IntraVENous Q4H PRN    DULoxetine (CYMBALTA) capsule 30 mg  30 mg Oral DAILY    gabapentin (NEURONTIN) capsule 100 mg  100 mg Oral TID    omega-3 acid ethyl esters (LOVAZA) capsule 1,000 mg  1 g Oral BID WITH MEALS    hydrALAZINE (APRESOLINE) tablet 25 mg  25 mg Oral TID PRN       Imaging:   XR Results (most recent):  Results from Hospital Encounter encounter on 01/30/22    XR CHEST PORT    Narrative  EXAM: XR CHEST PORT    INDICATION: 26 years Female. cough. ADDITIONAL HISTORY: None. TECHNIQUE: Frontal view of the chest.    COMPARISON: 7/19/2021    FINDINGS:    Underpenetration of the lung bases. Obscuration of the left heart border and  left hemidiaphragm. The cardiac silhouette appears similar to prior. Dense opacity is at the left  lung base, blunting of the left costophrenic sulcus. Hazy opacity at the right  lung base. Diffuse bilateral interstitial opacities within the left greater than  right lungs. Additional airspace opacity at the peripheral aspect of the left  mid/upper lung zone. The right costophrenic sulcus is sharp. No definite pneumothorax.     No acute osseous abnormality appreciated. Impression  1. Multifocal patchy airspace opacities involving the left greater than right  lung bases as well as the left midlung zone. Background of diffuse bilateral  interstitial opacities. Findings are most suspicious for multifocal pneumonia,  possible Covid 19 pneumonia; although other infectious or inflammatory process  is possible. Recommend radiographic follow-up within 6-8 weeks after appropriate  medical therapy and imaging follow-up until clearance. 2.  Possible left pleural effusion. CT Results (most recent):  Results from [place] encounter on 01/30/22    CTA CHEST W OR W WO CONT    Narrative  CTA CHEST PULMONARY EMBOLISM PROTOCOL    INDICATION: Morbid obesity, asthma with shortness of breath, wheezing, diarrhea,  cough, headache, myalgias, decreased appetite, loss of taste and smell, nausea,  vomiting, question of Covid. Question pulmonary embolism. TECHNIQUE: Thin collimation axial images obtained through the level of the  pulmonary arteries with additional imaging through the chest following the  uneventful administration of intravenous contrast.  Images reconstructed into  three dimensional coronal and sagittal projections for complete evaluation of  the tortuous and overlapping pulmonary vascular structures and to reduce patient  radiation dose. All CT scans at this facility are performed using dose optimization technique as  appropriate to a performed exam, to include automated exposure control,  adjustment of the mA and/or kV according to patient size (including appropriate  matching first site-specific examinations), or use of iterative reconstruction  technique. COMPARISON: 2/1/2021    FINDINGS: Evaluation limited by body habitus. There is suboptimal contrast density to assess for chronic filling defects and  respiratory motion also limits assessment.  No visible pulmonary emboli, though  many of the segments are nondiagnostic. Thyroid: Unremarkable in its visualized aspects. Pericardium/ Heart: No significant effusion. Aorta/ Vessels: No aneurysm or dissection. Lymph Nodes: 1.1 cm left periaortic node. 1.2 cm subcarinal node. 1.4 cm right  hilar node. .    Lungs: There are bilateral dense lung consolidations, left greater than right. The majority of the left lung is affected. Large consolidation in the right  middle lobe. Pleura: Trace bilateral pleural effusions. No pneumothorax. Upper Abdomen: Hepatic steatosis. The spleen is not completely included in the  field-of-view but may be enlarged. Bones/soft tissues: Degenerative disc disease. .    Impression  1. No visible acute pulmonary emboli, though several of the segments are  nondiagnostic secondary to respiratory motion. 2.  Multifocal pneumonia which may be bacterial or viral. If viral, is more  advanced given the dense consolidations. 3.  Trace bilateral pleural effusions which are atypical in Covid 19.  4.  Mild lymphadenopathy, likely reactive, also atypical in Covid 19.  5.  Hepatic steatosis. 6.  Incompletely visualized but potential splenomegaly.            Labs:    Recent Results (from the past 48 hour(s))   GLUCOSE, POC    Collection Time: 02/03/22 11:40 AM   Result Value Ref Range    Glucose (POC) 318 (H) 70 - 110 mg/dL   GLUCOSE, POC    Collection Time: 02/03/22  4:42 PM   Result Value Ref Range    Glucose (POC) 357 (H) 70 - 110 mg/dL   GLUCOSE, POC    Collection Time: 02/03/22 10:27 PM   Result Value Ref Range    Glucose (POC) 353 (H) 70 - 110 mg/dL   GLUCOSE, POC    Collection Time: 02/03/22 10:28 PM   Result Value Ref Range    Glucose (POC) 359 (H) 70 - 110 mg/dL   C REACTIVE PROTEIN, QT    Collection Time: 02/04/22 12:48 AM   Result Value Ref Range    C-Reactive protein 0.5 (H) 0 - 0.3 mg/dL   PROCALCITONIN    Collection Time: 02/04/22 12:48 AM   Result Value Ref Range    Procalcitonin <0.05 ng/mL   D DIMER    Collection Time: 02/04/22 12:48 AM   Result Value Ref Range    D DIMER 0.57 (H) <0.46 ug/ml(FEU)   GLUCOSE, POC    Collection Time: 02/04/22  7:50 AM   Result Value Ref Range    Glucose (POC) 413 (HH) 70 - 110 mg/dL   GLUCOSE, POC    Collection Time: 02/04/22  7:53 AM   Result Value Ref Range    Glucose (POC) 349 (H) 70 - 110 mg/dL   GLUCOSE, POC    Collection Time: 02/04/22 12:13 PM   Result Value Ref Range    Glucose (POC) 364 (H) 70 - 110 mg/dL   GLUCOSE, POC    Collection Time: 02/04/22  3:23 PM   Result Value Ref Range    Glucose (POC) 312 (H) 70 - 110 mg/dL   GLUCOSE, POC    Collection Time: 02/04/22  9:54 PM   Result Value Ref Range    Glucose (POC) 314 (H) 70 - 110 mg/dL   C REACTIVE PROTEIN, QT    Collection Time: 02/05/22  4:50 AM   Result Value Ref Range    C-Reactive protein <0.3 0 - 0.3 mg/dL   D DIMER    Collection Time: 02/05/22  4:50 AM   Result Value Ref Range    D DIMER 0.48 (H) <0.46 ug/ml(FEU)   TSH 3RD GENERATION    Collection Time: 02/05/22  4:50 AM   Result Value Ref Range    TSH 3.98 (H) 0.36 - 3.74 uIU/mL   T4, FREE    Collection Time: 02/05/22  4:50 AM   Result Value Ref Range    T4, Free 0.7 0.7 - 1.5 NG/DL   GLUCOSE, POC    Collection Time: 02/05/22  7:56 AM   Result Value Ref Range    Glucose (POC) 192 (H) 70 - 110 mg/dL       Signed By: Milagros Stern MD     February 5, 2022      I spent 25 minutes with the patient in face-to-face consultation, of which greater than 50% was spent in counseling and coordination of care as described above    Disclaimer: Sections of this note are dictated using utilizing voice recognition software. Minor typographical errors may be present. If questions arise, please do not hesitate to contact me or call our department.

## 2022-02-05 NOTE — PROGRESS NOTES
[facility]  General Surgery    Subjective:     CC: Left breast abscess, right anterior thigh abscess     HPI: Patient is a very pleasant 41-year-old female who is morbidly obese with BMI of 75.53 kg/m² admitted with acute respiratory failure with hypoxia secondary to COVID-19 pneumonia. Past medical history is remarkable for tobacco abuse, depression and morbid obesity. I was asked to see her for abscesses of the left breast lower outer quadrant and right anterior thigh. The patient states that she did not note these abscesses until she arrived here at the hospital.      Patient Active Problem List    Diagnosis Date Noted    Acute respiratory failure with hypoxia ([facility] [address] 01/30/2022    Hypoxia 01/30/2022    Pneumonia due to COVID-19 virus 01/30/2022     No past medical history on file. Past Surgical History:   Procedure Laterality Date    HX APPENDECTOMY        No family history on file. Social History     Tobacco Use    Smoking status: Current Every Day Smoker    Smokeless tobacco: Never Used   Substance Use Topics    Alcohol use: Not Currently      No Known Allergies    Prior to Admission medications    Medication Sig Start Date End Date Taking? Authorizing Provider   DULoxetine (Cymbalta) 60 mg capsule Take 60 mg by mouth daily. Yes Provider, Historical   gabapentin (Neurontin) 100 mg capsule Take 100 mg by mouth three (3) times daily. Yes Provider, Historical   acetaminophen (TYLENOL) 325 mg tablet Take 2 Tabs by mouth every four (4) hours as needed for Pain. Patient not taking: Reported on 6/2/2021 3/16/21   MAXIMILIAN Santos   LORazepam (ATIVAN) 1 mg tablet Take 0.5 Tabs by mouth every eight (8) hours as needed for Anxiety. Max Daily Amount: 1.5 mg. Patient not taking: Reported on 6/2/2021 2/15/21   Jonatan Rick MD   ibuprofen (MOTRIN) 800 mg tablet Take 1 Tab by mouth every eight (8) hours as needed for Pain.   Patient not taking: Reported on 2/2/2022 2/5/21   Sarah Lora Danielle Gee MD       Review of Systems:    14 systems were reviewed. The results are as above in the HPI and otherwise negative. Objective:     Vitals:    02/04/22 2139 02/05/22 0733 02/05/22 1150 02/05/22 1549   BP: 130/76 115/64 131/73 121/66   Pulse: 61 72 62 (!) 58   Resp: 20 18 18 18   Temp: 98 °F (36.7 °C) 97.6 °F (36.4 °C) 98.1 °F (36.7 °C) 97.5 °F (36.4 °C)   SpO2: 95% 98% 95% 96%   Weight: (!) 199.6 kg (440 lb)      Height: 5' 4\" (1.626 m)          Physical Exam:  GENERAL: alert, cooperative, no distress, appears stated age,   EYE: conjunctivae/corneas clear. PERRL, EOM's intact. THROAT & NECK: normal and no erythema or exudates noted. ,    LYMPHATIC: Cervical, supraclavicular, and axillary nodes normal. ,   LUNG: clear to auscultation bilaterally,   HEART: regular rate and rhythm, S1, S2 normal, no murmur, click, rub or gallop,   ABDOMEN: soft, non-tender. Bowel sounds normal. No masses,  no organomegaly,   EXTREMITIES:  extremities normal, atraumatic, no cyanosis or edema,   SKIN: Left breast lower outer quadrant 5:30 position 10 cm from the nipple there is a 2 cm x 2 cm inflamed cystic mass of the breast draining pus. Right anterior thigh approximately 5 cm below the groin crease there is a 1.5 x 1.5 cm blister. NEUROLOGIC: AOx3. Cranial nerves 2-12 and sensation grossly intact. ,     Data Review:    Recent Results (from the past 24 hour(s))   GLUCOSE, POC    Collection Time: 02/04/22  9:54 PM   Result Value Ref Range    Glucose (POC) 314 (H) 70 - 110 mg/dL   C REACTIVE PROTEIN, QT    Collection Time: 02/05/22  4:50 AM   Result Value Ref Range    C-Reactive protein <0.3 0 - 0.3 mg/dL   PROCALCITONIN    Collection Time: 02/05/22  4:50 AM   Result Value Ref Range    Procalcitonin <0.05 ng/mL   D DIMER    Collection Time: 02/05/22  4:50 AM   Result Value Ref Range    D DIMER 0.48 (H) <0.46 ug/ml(FEU)   TSH 3RD GENERATION    Collection Time: 02/05/22  4:50 AM   Result Value Ref Range    TSH 3.98 (H) 0.36 - 3.74 uIU/mL   T4, FREE    Collection Time: 02/05/22  4:50 AM   Result Value Ref Range    T4, Free 0.7 0.7 - 1.5 NG/DL   GLUCOSE, POC    Collection Time: 02/05/22  7:56 AM   Result Value Ref Range    Glucose (POC) 192 (H) 70 - 110 mg/dL   GLUCOSE, POC    Collection Time: 02/05/22 11:43 AM   Result Value Ref Range    Glucose (POC) 165 (H) 70 - 110 mg/dL   GLUCOSE, POC    Collection Time: 02/05/22  3:36 PM   Result Value Ref Range    Glucose (POC) 232 (H) 70 - 110 mg/dL       Impression:     · Patient with blister of the right anterior thigh secondary to the pannus moisture and friction. She has a abscess of the left breast lower outer quadrant.     Plan:     · Ancef 3 g IV every 8 hours for left breast abscess  · We will plan for drainage of the abscess in the near future    Signed By: Kristi Albarran MD     February 5, 2022

## 2022-02-05 NOTE — PROGRESS NOTES
[facility]- Initial Progress Note    Pharmacy Consult ordered by Dr. Marisol Watkins     Indication: Abscess    Patient clinical status and labs ordered/reviewed. Pt Weight Weight: (!) 199.6 kg (440 lb)     Serum Creatinine Lab Results   Component Value Date/Time    Creatinine 0.67 02/03/2022 02:53 AM       Creatinine Clearance Estimated Creatinine Clearance: 218.6 mL/min (by C-G formula based on SCr of 0.67 mg/dL).    BUN Lab Results   Component Value Date/Time    BUN 16 02/03/2022 02:53 AM       WBC Lab Results   Component Value Date/Time    WBC 6.7 02/03/2022 02:53 AM      Temperature Temp: 97.5 °F (36.4 °C)   HR Pulse (Heart Rate): (!) 58     BP BP: 121/66           Assessment/Plan:    Analysis using [employer] gives the following patient-specific pharmacokinetic parameters:    CL: 8.39 undefined   V: 25.8 L   T1/2: 4.67 hours    Vancomycin loading dose of 2000 mg at 20:00 02/05/2022, followed by a regimen of 1500 mg IV every 8 hours, which is predicted to result in a steady-state trough of 13.8 mg/L and AUC24 of 537 mg/L.hr.    Daily BMP  Continue to monitor    Sign: Kerry Arora PHARMD  Date: 2/5/2022  Time: 6:31 PM

## 2022-02-06 ENCOUNTER — ANESTHESIA EVENT (OUTPATIENT)
Dept: SURGERY | Age: 27
DRG: 137 | End: 2022-02-06
Payer: MEDICAID

## 2022-02-06 LAB
ANION GAP SERPL CALC-SCNC: 9 MMOL/L (ref 3–18)
BASOPHILS # BLD: 0 K/UL (ref 0–0.1)
BASOPHILS NFR BLD: 0 % (ref 0–2)
BUN SERPL-MCNC: 20 MG/DL (ref 7–18)
BUN/CREAT SERPL: 27 (ref 12–20)
CALCIUM SERPL-MCNC: 8.2 MG/DL (ref 8.5–10.1)
CHLORIDE SERPL-SCNC: 99 MMOL/L (ref 100–111)
CO2 SERPL-SCNC: 25 MMOL/L (ref 21–32)
CREAT SERPL-MCNC: 0.73 MG/DL (ref 0.6–1.3)
CRP SERPL-MCNC: <0.3 MG/DL (ref 0–0.3)
D DIMER PPP FEU-MCNC: 0.51 UG/ML(FEU)
DIFFERENTIAL METHOD BLD: ABNORMAL
EOSINOPHIL # BLD: 0 K/UL (ref 0–0.4)
EOSINOPHIL NFR BLD: 0 % (ref 0–5)
ERYTHROCYTE [DISTWIDTH] IN BLOOD BY AUTOMATED COUNT: 13.8 % (ref 11.6–14.5)
GLUCOSE BLD STRIP.AUTO-MCNC: 225 MG/DL (ref 70–110)
GLUCOSE BLD STRIP.AUTO-MCNC: 232 MG/DL (ref 70–110)
GLUCOSE BLD STRIP.AUTO-MCNC: 282 MG/DL (ref 70–110)
GLUCOSE BLD STRIP.AUTO-MCNC: 295 MG/DL (ref 70–110)
GLUCOSE BLD STRIP.AUTO-MCNC: 334 MG/DL (ref 70–110)
GLUCOSE SERPL-MCNC: 301 MG/DL (ref 74–99)
HCT VFR BLD AUTO: 43.5 % (ref 35–45)
HGB BLD-MCNC: 13.7 G/DL (ref 12–16)
IMM GRANULOCYTES # BLD AUTO: 0 K/UL
IMM GRANULOCYTES NFR BLD AUTO: 0 %
INR PPP: 1 (ref 0.8–1.2)
LYMPHOCYTES # BLD: 2 K/UL (ref 0.9–3.6)
LYMPHOCYTES NFR BLD: 16 % (ref 21–52)
MCH RBC QN AUTO: 28.2 PG (ref 24–34)
MCHC RBC AUTO-ENTMCNC: 31.5 G/DL (ref 31–37)
MCV RBC AUTO: 89.5 FL (ref 78–100)
MONOCYTES # BLD: 1.1 K/UL (ref 0.05–1.2)
MONOCYTES NFR BLD: 9 % (ref 3–10)
NEUTS SEG # BLD: 9.4 K/UL (ref 1.8–8)
NEUTS SEG NFR BLD: 75 % (ref 40–73)
NRBC # BLD: 0 K/UL (ref 0–0.01)
NRBC BLD-RTO: 0 PER 100 WBC
PHOSPHATE SERPL-MCNC: 3.5 MG/DL (ref 2.5–4.9)
PLATELET # BLD AUTO: 379 K/UL (ref 135–420)
PLATELET COMMENTS,PCOM: ABNORMAL
PMV BLD AUTO: 9.7 FL (ref 9.2–11.8)
POTASSIUM SERPL-SCNC: 3.9 MMOL/L (ref 3.5–5.5)
PROCALCITONIN SERPL-MCNC: <0.05 NG/ML
PROTHROMBIN TIME: 13.4 SEC (ref 11.5–15.2)
RBC # BLD AUTO: 4.86 M/UL (ref 4.2–5.3)
RBC MORPH BLD: ABNORMAL
SODIUM SERPL-SCNC: 133 MMOL/L (ref 136–145)
T3 SERPL-MCNC: 76 NG/DL (ref 71–180)
WBC # BLD AUTO: 12.5 K/UL (ref 4.6–13.2)

## 2022-02-06 PROCEDURE — 74011250636 HC RX REV CODE- 250/636: Performed by: SURGERY

## 2022-02-06 PROCEDURE — 74011636637 HC RX REV CODE- 636/637: Performed by: HOSPITALIST

## 2022-02-06 PROCEDURE — 84145 PROCALCITONIN (PCT): CPT

## 2022-02-06 PROCEDURE — 80048 BASIC METABOLIC PNL TOTAL CA: CPT

## 2022-02-06 PROCEDURE — 74011000250 HC RX REV CODE- 250: Performed by: SURGERY

## 2022-02-06 PROCEDURE — 84100 ASSAY OF PHOSPHORUS: CPT

## 2022-02-06 PROCEDURE — 77010033678 HC OXYGEN DAILY

## 2022-02-06 PROCEDURE — 85379 FIBRIN DEGRADATION QUANT: CPT

## 2022-02-06 PROCEDURE — 74011000250 HC RX REV CODE- 250: Performed by: PHYSICIAN ASSISTANT

## 2022-02-06 PROCEDURE — 85610 PROTHROMBIN TIME: CPT

## 2022-02-06 PROCEDURE — 74011250637 HC RX REV CODE- 250/637: Performed by: INTERNAL MEDICINE

## 2022-02-06 PROCEDURE — 74011250637 HC RX REV CODE- 250/637: Performed by: STUDENT IN AN ORGANIZED HEALTH CARE EDUCATION/TRAINING PROGRAM

## 2022-02-06 PROCEDURE — 77030040393 HC DRSG OPTIFOAM GENT MDII -B

## 2022-02-06 PROCEDURE — 86140 C-REACTIVE PROTEIN: CPT

## 2022-02-06 PROCEDURE — 65660000000 HC RM CCU STEPDOWN

## 2022-02-06 PROCEDURE — 2709999900 HC NON-CHARGEABLE SUPPLY

## 2022-02-06 PROCEDURE — 85025 COMPLETE CBC W/AUTO DIFF WBC: CPT

## 2022-02-06 PROCEDURE — 99232 SBSQ HOSP IP/OBS MODERATE 35: CPT | Performed by: HOSPITALIST

## 2022-02-06 PROCEDURE — 94640 AIRWAY INHALATION TREATMENT: CPT

## 2022-02-06 PROCEDURE — 74011250636 HC RX REV CODE- 250/636: Performed by: INTERNAL MEDICINE

## 2022-02-06 PROCEDURE — 82962 GLUCOSE BLOOD TEST: CPT

## 2022-02-06 PROCEDURE — 74011250637 HC RX REV CODE- 250/637: Performed by: HOSPITALIST

## 2022-02-06 PROCEDURE — 36415 COLL VENOUS BLD VENIPUNCTURE: CPT

## 2022-02-06 PROCEDURE — 83735 ASSAY OF MAGNESIUM: CPT

## 2022-02-06 RX ORDER — TRAMADOL HYDROCHLORIDE 50 MG/1
50 TABLET ORAL
Status: DISCONTINUED | OUTPATIENT
Start: 2022-02-06 | End: 2022-02-08 | Stop reason: HOSPADM

## 2022-02-06 RX ORDER — VANCOMYCIN/0.9 % SOD CHLORIDE 1.5G/250ML
1500 PLASTIC BAG, INJECTION (ML) INTRAVENOUS EVERY 8 HOURS
Status: DISCONTINUED | OUTPATIENT
Start: 2022-02-07 | End: 2022-02-08 | Stop reason: HOSPADM

## 2022-02-06 RX ORDER — FAMOTIDINE 20 MG/1
20 TABLET, FILM COATED ORAL ONCE
Status: CANCELLED | OUTPATIENT
Start: 2022-02-06 | End: 2022-02-06

## 2022-02-06 RX ORDER — SODIUM CHLORIDE 0.9 % (FLUSH) 0.9 %
5-40 SYRINGE (ML) INJECTION EVERY 8 HOURS
Status: CANCELLED | OUTPATIENT
Start: 2022-02-06

## 2022-02-06 RX ORDER — SODIUM CHLORIDE, SODIUM LACTATE, POTASSIUM CHLORIDE, CALCIUM CHLORIDE 600; 310; 30; 20 MG/100ML; MG/100ML; MG/100ML; MG/100ML
75 INJECTION, SOLUTION INTRAVENOUS CONTINUOUS
Status: CANCELLED | OUTPATIENT
Start: 2022-02-06 | End: 2022-02-07

## 2022-02-06 RX ORDER — SODIUM CHLORIDE 0.9 % (FLUSH) 0.9 %
5-40 SYRINGE (ML) INJECTION AS NEEDED
Status: CANCELLED | OUTPATIENT
Start: 2022-02-06

## 2022-02-06 RX ORDER — INSULIN GLARGINE 100 [IU]/ML
30 INJECTION, SOLUTION SUBCUTANEOUS
Status: COMPLETED | OUTPATIENT
Start: 2022-02-06 | End: 2022-02-06

## 2022-02-06 RX ORDER — VANCOMYCIN 2 GRAM/500 ML IN 0.9 % SODIUM CHLORIDE INTRAVENOUS
2000 ONCE
Status: COMPLETED | OUTPATIENT
Start: 2022-02-06 | End: 2022-02-07

## 2022-02-06 RX ADMIN — NYSTATIN: 100000 POWDER TOPICAL at 17:32

## 2022-02-06 RX ADMIN — IPRATROPIUM BROMIDE AND ALBUTEROL 1 PUFF: 20; 100 SPRAY, METERED RESPIRATORY (INHALATION) at 14:00

## 2022-02-06 RX ADMIN — INSULIN GLARGINE 30 UNITS: 100 INJECTION, SOLUTION SUBCUTANEOUS at 21:45

## 2022-02-06 RX ADMIN — VANCOMYCIN HYDROCHLORIDE 2000 MG: 10 INJECTION, POWDER, LYOPHILIZED, FOR SOLUTION INTRAVENOUS at 22:52

## 2022-02-06 RX ADMIN — IPRATROPIUM BROMIDE AND ALBUTEROL 1 PUFF: 20; 100 SPRAY, METERED RESPIRATORY (INHALATION) at 08:00

## 2022-02-06 RX ADMIN — WATER 3 G: 1 INJECTION INTRAMUSCULAR; INTRAVENOUS; SUBCUTANEOUS at 09:41

## 2022-02-06 RX ADMIN — WATER 3 G: 1 INJECTION INTRAMUSCULAR; INTRAVENOUS; SUBCUTANEOUS at 01:53

## 2022-02-06 RX ADMIN — OMEGA-3-ACID ETHYL ESTERS 1000 MG: 1 CAPSULE, LIQUID FILLED ORAL at 17:24

## 2022-02-06 RX ADMIN — Medication 6 UNITS: at 07:47

## 2022-02-06 RX ADMIN — SODIUM CHLORIDE, PRESERVATIVE FREE 10 ML: 5 INJECTION INTRAVENOUS at 23:08

## 2022-02-06 RX ADMIN — GABAPENTIN 100 MG: 100 CAPSULE ORAL at 22:50

## 2022-02-06 RX ADMIN — BUDESONIDE AND FORMOTEROL FUMARATE DIHYDRATE 2 PUFF: 160; 4.5 AEROSOL RESPIRATORY (INHALATION) at 22:00

## 2022-02-06 RX ADMIN — FUROSEMIDE 20 MG: 20 TABLET ORAL at 09:40

## 2022-02-06 RX ADMIN — BUDESONIDE AND FORMOTEROL FUMARATE DIHYDRATE 2 PUFF: 160; 4.5 AEROSOL RESPIRATORY (INHALATION) at 08:00

## 2022-02-06 RX ADMIN — NYSTATIN: 100000 POWDER TOPICAL at 09:41

## 2022-02-06 RX ADMIN — Medication 6 UNITS: at 12:33

## 2022-02-06 RX ADMIN — GABAPENTIN 100 MG: 100 CAPSULE ORAL at 17:24

## 2022-02-06 RX ADMIN — Medication 12 UNITS: at 17:34

## 2022-02-06 RX ADMIN — GABAPENTIN 100 MG: 100 CAPSULE ORAL at 09:40

## 2022-02-06 RX ADMIN — INSULIN LISPRO 12 UNITS: 100 INJECTION, SOLUTION INTRAVENOUS; SUBCUTANEOUS at 12:34

## 2022-02-06 RX ADMIN — IPRATROPIUM BROMIDE AND ALBUTEROL 1 PUFF: 20; 100 SPRAY, METERED RESPIRATORY (INHALATION) at 22:00

## 2022-02-06 RX ADMIN — INSULIN LISPRO 12 UNITS: 100 INJECTION, SOLUTION INTRAVENOUS; SUBCUTANEOUS at 17:36

## 2022-02-06 RX ADMIN — INSULIN GLARGINE 45 UNITS: 100 INJECTION, SOLUTION SUBCUTANEOUS at 08:24

## 2022-02-06 RX ADMIN — Medication 9 UNITS: at 21:44

## 2022-02-06 RX ADMIN — WATER 3 G: 1 INJECTION INTRAMUSCULAR; INTRAVENOUS; SUBCUTANEOUS at 18:00

## 2022-02-06 RX ADMIN — OMEGA-3-ACID ETHYL ESTERS 1000 MG: 1 CAPSULE, LIQUID FILLED ORAL at 09:40

## 2022-02-06 RX ADMIN — TRAMADOL HYDROCHLORIDE 50 MG: 50 TABLET ORAL at 17:24

## 2022-02-06 RX ADMIN — DEXAMETHASONE SODIUM PHOSPHATE 10 MG: 4 INJECTION, SOLUTION INTRAMUSCULAR; INTRAVENOUS at 09:40

## 2022-02-06 RX ADMIN — DULOXETINE 30 MG: 30 CAPSULE, DELAYED RELEASE ORAL at 09:40

## 2022-02-06 RX ADMIN — INSULIN LISPRO 12 UNITS: 100 INJECTION, SOLUTION INTRAVENOUS; SUBCUTANEOUS at 07:47

## 2022-02-06 NOTE — PROGRESS NOTES
Lewis Cash M.D.  FACS  PROGRESS NOTE    Name: Lisa Saleem MRN: [id number]   : 1995 Hospital: [facility]   Date: 2022 Admission Date: 2022 12:40 PM     Hospital Day: 8     Subjective:  Patient without acute overnight events  Objective:  Vitals:    22 2139 22 0733 22 1150 22 1549   BP: 130/76 115/64 131/73 121/66   Pulse: 61 72 62 (!) 58   Resp: 20 18 18 18   Temp: 98 °F (36.7 °C) 97.6 °F (36.4 °C) 98.1 °F (36.7 °C) 97.5 °F (36.4 °C)   SpO2: 95% 98% 95% 96%   Weight: (!) 199.6 kg (440 lb)      Height: 5' 4\" (1.626 m)               Physical Exam:    General: Awake and alert, no apparent distress   Left breast with abscess in the lower outer quadrant draining pus   Labs:  Recent Results (from the past 24 hour(s))   GLUCOSE, POC    Collection Time: 22  7:56 AM   Result Value Ref Range    Glucose (POC) 192 (H) 70 - 110 mg/dL   GLUCOSE, POC    Collection Time: 22 11:43 AM   Result Value Ref Range    Glucose (POC) 165 (H) 70 - 110 mg/dL   GLUCOSE, POC    Collection Time: 22  3:36 PM   Result Value Ref Range    Glucose (POC) 232 (H) 70 - 110 mg/dL   GLUCOSE, POC    Collection Time: 22 10:29 PM   Result Value Ref Range    Glucose (POC) 307 (H) 70 - 110 mg/dL   C REACTIVE PROTEIN, QT    Collection Time: 22 12:51 AM   Result Value Ref Range    C-Reactive protein <0.3 0 - 0.3 mg/dL   PROCALCITONIN    Collection Time: 22 12:51 AM   Result Value Ref Range    Procalcitonin <0.05 ng/mL   D DIMER    Collection Time: 22 12:51 AM   Result Value Ref Range    D DIMER 0.51 (H) <0.46 ug/ml(FEU)   CBC WITH AUTOMATED DIFF    Collection Time: 22 12:51 AM   Result Value Ref Range    WBC 12.5 4.6 - 13.2 K/uL    RBC 4.86 4.20 - 5.30 M/uL    HGB 13.7 12.0 - 16.0 g/dL    HCT 43.5 35.0 - 45.0 %    MCV 89.5 78.0 - 100.0 FL    MCH 28.2 24.0 - 34.0 PG    MCHC 31.5 31.0 - 37.0 g/dL    RDW 13.8 11.6 - 14.5 %    PLATELET 214 328 - 612 K/uL MPV 9.7 9.2 - 11.8 FL    NRBC 0.0 0  WBC    ABSOLUTE NRBC 0.00 0.00 - 0.01 K/uL    NEUTROPHILS 75 (H) 40 - 73 %    LYMPHOCYTES 16 (L) 21 - 52 %    MONOCYTES 9 3 - 10 %    EOSINOPHILS 0 0 - 5 %    BASOPHILS 0 0 - 2 %    IMMATURE GRANULOCYTES 0 %    ABS. NEUTROPHILS 9.4 (H) 1.8 - 8.0 K/UL    ABS. LYMPHOCYTES 2.0 0.9 - 3.6 K/UL    ABS. MONOCYTES 1.1 0.05 - 1.2 K/UL    ABS. EOSINOPHILS 0.0 0.0 - 0.4 K/UL    ABS. BASOPHILS 0.0 0.0 - 0.1 K/UL    ABS. IMM.  GRANS. 0.0 K/UL    DF MANUAL      PLATELET COMMENTS ADEQUATE PLATELETS      RBC COMMENTS NORMOCYTIC, NORMOCHROMIC     METABOLIC PANEL, BASIC    Collection Time: 02/06/22 12:51 AM   Result Value Ref Range    Sodium 133 (L) 136 - 145 mmol/L    Potassium 3.9 3.5 - 5.5 mmol/L    Chloride 99 (L) 100 - 111 mmol/L    CO2 25 21 - 32 mmol/L    Anion gap 9 3.0 - 18 mmol/L    Glucose 301 (H) 74 - 99 mg/dL    BUN 20 (H) 7.0 - 18 MG/DL    Creatinine 0.73 0.6 - 1.3 MG/DL    BUN/Creatinine ratio 27 (H) 12 - 20      GFR est AA >60 >60 ml/min/1.73m2    GFR est non-AA >60 >60 ml/min/1.73m2    Calcium 8.2 (L) 8.5 - 10.1 MG/DL   PHOSPHORUS    Collection Time: 02/06/22 12:51 AM   Result Value Ref Range    Phosphorus 3.5 2.5 - 4.9 MG/DL   PROTHROMBIN TIME + INR    Collection Time: 02/06/22 12:51 AM   Result Value Ref Range    Prothrombin time 13.4 11.5 - 15.2 sec    INR 1.0 0.8 - 1.2     GLUCOSE, POC    Collection Time: 02/06/22  3:23 AM   Result Value Ref Range    Glucose (POC) 295 (H) 70 - 110 mg/dL     All Micro Results       Procedure Component Value Units Date/Time    CULTURE, Boyd Grout STAIN [[id number]] Collected: 02/05/22 1647    Order Status: Completed Specimen: Wound from Breast cyst, Left Updated: 02/06/22 0256    CULTURE, BLOOD [[id number]] Collected: 01/30/22 1615    Order Status: Completed Specimen: Blood Updated: 02/05/22 0659     Special Requests: RAC     Culture result: NO GROWTH 6 DAYS       CULTURE, BLOOD [[id number]]  (Abnormal) Collected: 01/30/22 1600    Order Status: Completed Specimen: Blood Updated: 02/03/22 1008     Special Requests: [facility]     GRAM STAIN       ANAEROBIC BOTTLE GRAM POSITIVE COCCI IN GROUPS                  SMEAR CALLED TO AND CORRECTLY REPEATED BY: DR RUBEN VALVERDE ER ON [id number] AT 2117 HRS TO 1396.            Culture result:       ANAEROBIC GRAM POSITIVE COCCI (MULTIPLE COLONY TYPES) GROWING IN 1 OF 2 BOTTLES DRAWN SITE = [facility]          RESPIRATORY VIRUS PANEL W/COVID-19, PCR [[id number]]  (Abnormal) Collected: 01/30/22 2010    Order Status: Completed Specimen: Nasopharyngeal Updated: 01/30/22 2158     Adenovirus Not detected        Coronavirus 229E Not detected        Coronavirus HKU1 Not detected        Coronavirus CVNL63 Not detected        Coronavirus OC43 Not detected        SARS-CoV-2, PCR Detected        Comment: CALLED TO AND CORRECTLY REPEATED BY:  [facility] AT 2155 BY KDA 1/30/22          Metapneumovirus Not detected        Rhinovirus and Enterovirus Not detected        Influenza A Not detected        Influenza A, subtype H1 Not detected        Influenza A, subtype H3 Not detected        INFLUENZA A H1N1 PCR Not detected        Influenza B Not detected        Parainfluenza 1 Not detected        Parainfluenza 2 Not detected        Parainfluenza 3 Not detected        Parainfluenza virus 4 Not detected        RSV by PCR Not detected        B. parapertussis, PCR Not detected        Bordetella pertussis - PCR Not detected        Chlamydophila pneumoniae DNA, QL, PCR Not detected        Mycoplasma pneumoniae DNA, QL, PCR Not detected       CULTURE, RESPIRATORY/SPUTUM/BRONCH Ermalinda Neat [[id number]] Collected: 01/30/22 1715    Order Status: Canceled Specimen: Sputum     COVID-19 RAPID TEST [[id number]] Collected: 01/30/22 1320    Order Status: Completed Specimen: Nasopharyngeal Updated: 01/30/22 1359     Specimen source Nasopharyngeal        COVID-19 rapid test Not detected        Comment: Rapid Abbott ID Now       Rapid NAAT:  The specimen is NEGATIVE for SARS-CoV-2, the novel coronavirus associated with COVID-19. Negative results should be treated as presumptive and, if inconsistent with clinical signs and symptoms or necessary for patient management, should be tested with an alternative molecular assay. Negative results do not preclude SARS-CoV-2 infection and should not be used as the sole basis for patient management decisions. This test has been authorized by the FDA under an Emergency Use Authorization (EUA) for use by authorized laboratories.    Fact sheet for Healthcare Providers: [employer]  Fact sheet for Patients: [employer]       Methodology: Isothermal Nucleic Acid Amplification                 Current Medications:  Current Facility-Administered Medications   Medication Dose Route Frequency Provider Last Rate Last Admin    nystatin (MYCOSTATIN) 100,000 unit/gram powder   Topical BID Vinicio Scott MD        ceFAZolin (ANCEF) 3 g in sterile water (preservative free) 30 mL IV syringe  3 g IntraVENous Q8H José Luis Hughes MD   3 g at 02/06/22 0153    vancomycin (VANCOCIN) 2000 mg in  ml infusion  2,000 mg IntraVENous ONCE Emeli Helm MD        Followed by    vancomycin (VANCOCIN) 1500 mg in  ml infusion  1,500 mg IntraVENous Q8H Emeli Helm MD        insulin glargine (LANTUS) injection 45 Units  45 Units SubCUTAneous Q12H Angel Schrader MD   45 Units at 02/05/22 2100    furosemide (LASIX) tablet 20 mg  20 mg Oral DAILY Angel Schrader MD   20 mg at 02/05/22 0956    ipratropium-albuterol (COMBIVENT RESPIMAT) 20 mcg-100 mcg inhalation spray  1 Puff Inhalation Q6H RT Kaylee Carty MD   1 Puff at 02/05/22 1844    insulin lispro (HUMALOG) injection 12 Units  12 Units SubCUTAneous TIDAC Angel Schrader MD   12 Units at 02/05/22 1545    enoxaparin (LOVENOX) injection 40 mg  40 mg SubCUTAneous Q12H Angel Schrader MD   40 mg at 02/05/22 1840 dexamethasone (DECADRON) 4 mg/mL injection 10 mg  10 mg IntraVENous Q24H Marthe Denver R, MD   10 mg at 02/05/22 0956    budesonide-formoteroL (SYMBICORT) 160-4.5 mcg/actuation HFA inhaler 2 Puff  2 Puff Inhalation BID RT Armand Burgos MD   2 Puff at 02/05/22 0730    sodium chloride (NS) flush 5-10 mL  5-10 mL IntraVENous PRN Kit MAXIMILIAN Valladares   10 mL at 02/04/22 2155    insulin lispro (HUMALOG) injection   SubCUTAneous AC&HS Claire Conroy MD   6 Units at 02/05/22 1544    glucose chewable tablet 16 g  16 g Oral PRN Armand Burgos MD        glucagon (GLUCAGEN) injection 1 mg  1 mg IntraMUSCular PRN Armand Burgos MD        dextrose 10% infusion 125-250 mL  125-250 mL IntraVENous PRN Armand Burgos MD        acetaminophen (TYLENOL) tablet 500 mg  500 mg Oral Q6H PRN Armand Burgos MD   500 mg at 02/05/22 0606    ondansetron (ZOFRAN) injection 4 mg  4 mg IntraVENous Q4H PRN Armand Burgos MD   4 mg at 01/31/22 1606    DULoxetine (CYMBALTA) capsule 30 mg  30 mg Oral DAILY Armand Burgos MD   30 mg at 02/05/22 0729    gabapentin (NEURONTIN) capsule 100 mg  100 mg Oral TID Armand Burgos MD   100 mg at 02/05/22 2200    omega-3 acid ethyl esters (LOVAZA) capsule 1,000 mg  1 g Oral BID WITH MEALS Armand Burgos MD   1,000 mg at 02/05/22 1840    hydrALAZINE (APRESOLINE) tablet 25 mg  25 mg Oral TID PRN Lesvia Monday N, DO   25 mg at 02/03/22 2224       Chart and notes reviewed. Data reviewed. I have evaluated and examined the patient. IMPRESSION:   Patient with left breast abscess.       PLAN:/DISCUSION:   Incision and drainage of left breast abscess  Consent on chart  Preoperative orders written        Devika Medina MD

## 2022-02-07 ENCOUNTER — ANESTHESIA (OUTPATIENT)
Dept: SURGERY | Age: 27
DRG: 137 | End: 2022-02-07
Payer: MEDICAID

## 2022-02-07 VITALS
HEART RATE: 72 BPM | OXYGEN SATURATION: 97 % | TEMPERATURE: 97.8 F | RESPIRATION RATE: 18 BRPM | WEIGHT: 293 LBS | DIASTOLIC BLOOD PRESSURE: 79 MMHG | SYSTOLIC BLOOD PRESSURE: 150 MMHG | HEIGHT: 64 IN | BODY MASS INDEX: 50.02 KG/M2

## 2022-02-07 LAB
ANION GAP SERPL CALC-SCNC: 7 MMOL/L (ref 3–18)
BASOPHILS # BLD: 0 K/UL (ref 0–0.1)
BASOPHILS NFR BLD: 0 % (ref 0–2)
BUN SERPL-MCNC: 23 MG/DL (ref 7–18)
BUN/CREAT SERPL: 34 (ref 12–20)
CALCIUM SERPL-MCNC: 9 MG/DL (ref 8.5–10.1)
CHLORIDE SERPL-SCNC: 100 MMOL/L (ref 100–111)
CO2 SERPL-SCNC: 26 MMOL/L (ref 21–32)
CREAT SERPL-MCNC: 0.67 MG/DL (ref 0.6–1.3)
CRP SERPL-MCNC: <0.3 MG/DL (ref 0–0.3)
D DIMER PPP FEU-MCNC: 0.37 UG/ML(FEU)
DIFFERENTIAL METHOD BLD: ABNORMAL
EOSINOPHIL # BLD: 0 K/UL (ref 0–0.4)
EOSINOPHIL NFR BLD: 0 % (ref 0–5)
ERYTHROCYTE [DISTWIDTH] IN BLOOD BY AUTOMATED COUNT: 13.7 % (ref 11.6–14.5)
GLUCOSE BLD STRIP.AUTO-MCNC: 166 MG/DL (ref 70–110)
GLUCOSE BLD STRIP.AUTO-MCNC: 218 MG/DL (ref 70–110)
GLUCOSE BLD STRIP.AUTO-MCNC: 248 MG/DL (ref 70–110)
GLUCOSE BLD STRIP.AUTO-MCNC: 271 MG/DL (ref 70–110)
GLUCOSE SERPL-MCNC: 321 MG/DL (ref 74–99)
HCT VFR BLD AUTO: 42.2 % (ref 35–45)
HGB BLD-MCNC: 13.2 G/DL (ref 12–16)
IMM GRANULOCYTES # BLD AUTO: 0 K/UL
IMM GRANULOCYTES NFR BLD AUTO: 0 %
LYMPHOCYTES # BLD: 1.5 K/UL (ref 0.9–3.6)
LYMPHOCYTES NFR BLD: 12 % (ref 21–52)
MAGNESIUM SERPL-MCNC: 2 MG/DL (ref 1.6–2.3)
MAGNESIUM SERPL-MCNC: 2.2 MG/DL (ref 1.6–2.6)
MCH RBC QN AUTO: 27.6 PG (ref 24–34)
MCHC RBC AUTO-ENTMCNC: 31.3 G/DL (ref 31–37)
MCV RBC AUTO: 88.3 FL (ref 78–100)
MONOCYTES # BLD: 1 K/UL (ref 0.05–1.2)
MONOCYTES NFR BLD: 8 % (ref 3–10)
NEUTS SEG # BLD: 10.4 K/UL (ref 1.8–8)
NEUTS SEG NFR BLD: 80 % (ref 40–73)
NRBC # BLD: 0 K/UL (ref 0–0.01)
NRBC BLD-RTO: 0 PER 100 WBC
PLATELET # BLD AUTO: 335 K/UL (ref 135–420)
PLATELET COMMENTS,PCOM: ABNORMAL
PMV BLD AUTO: 9.7 FL (ref 9.2–11.8)
POTASSIUM SERPL-SCNC: 4 MMOL/L (ref 3.5–5.5)
PROCALCITONIN SERPL-MCNC: <0.05 NG/ML
RBC # BLD AUTO: 4.78 M/UL (ref 4.2–5.3)
RBC MORPH BLD: ABNORMAL
SODIUM SERPL-SCNC: 133 MMOL/L (ref 136–145)
WBC # BLD AUTO: 12.9 K/UL (ref 4.6–13.2)

## 2022-02-07 PROCEDURE — 2709999900 HC NON-CHARGEABLE SUPPLY: Performed by: SURGERY

## 2022-02-07 PROCEDURE — 99232 SBSQ HOSP IP/OBS MODERATE 35: CPT | Performed by: HOSPITALIST

## 2022-02-07 PROCEDURE — 74011250636 HC RX REV CODE- 250/636: Performed by: NURSE ANESTHETIST, CERTIFIED REGISTERED

## 2022-02-07 PROCEDURE — 85025 COMPLETE CBC W/AUTO DIFF WBC: CPT

## 2022-02-07 PROCEDURE — 84145 PROCALCITONIN (PCT): CPT

## 2022-02-07 PROCEDURE — 87077 CULTURE AEROBIC IDENTIFY: CPT

## 2022-02-07 PROCEDURE — 74011636637 HC RX REV CODE- 636/637: Performed by: HOSPITALIST

## 2022-02-07 PROCEDURE — 0H9U0ZZ DRAINAGE OF LEFT BREAST, OPEN APPROACH: ICD-10-PCS | Performed by: SURGERY

## 2022-02-07 PROCEDURE — 77030018836 HC SOL IRR NACL ICUM -A: Performed by: SURGERY

## 2022-02-07 PROCEDURE — 74011250636 HC RX REV CODE- 250/636: Performed by: INTERNAL MEDICINE

## 2022-02-07 PROCEDURE — 87075 CULTR BACTERIA EXCEPT BLOOD: CPT

## 2022-02-07 PROCEDURE — 80048 BASIC METABOLIC PNL TOTAL CA: CPT

## 2022-02-07 PROCEDURE — 87205 SMEAR GRAM STAIN: CPT

## 2022-02-07 PROCEDURE — 76060000032 HC ANESTHESIA 0.5 TO 1 HR: Performed by: SURGERY

## 2022-02-07 PROCEDURE — 74011250636 HC RX REV CODE- 250/636: Performed by: SURGERY

## 2022-02-07 PROCEDURE — 74011250637 HC RX REV CODE- 250/637: Performed by: INTERNAL MEDICINE

## 2022-02-07 PROCEDURE — 36415 COLL VENOUS BLD VENIPUNCTURE: CPT

## 2022-02-07 PROCEDURE — 86140 C-REACTIVE PROTEIN: CPT

## 2022-02-07 PROCEDURE — 76010000138 HC OR TIME 0.5 TO 1 HR: Performed by: SURGERY

## 2022-02-07 PROCEDURE — 2709999900 HC NON-CHARGEABLE SUPPLY

## 2022-02-07 PROCEDURE — 82962 GLUCOSE BLOOD TEST: CPT

## 2022-02-07 PROCEDURE — 10061 I&D ABSCESS COMP/MULTIPLE: CPT | Performed by: SURGERY

## 2022-02-07 PROCEDURE — 74011000250 HC RX REV CODE- 250: Performed by: NURSE ANESTHETIST, CERTIFIED REGISTERED

## 2022-02-07 PROCEDURE — 85379 FIBRIN DEGRADATION QUANT: CPT

## 2022-02-07 PROCEDURE — 74011000250 HC RX REV CODE- 250: Performed by: SURGERY

## 2022-02-07 PROCEDURE — 77030013708 HC HNDPC SUC IRR PULS STRY –B: Performed by: SURGERY

## 2022-02-07 RX ORDER — PROPOFOL 10 MG/ML
VIAL (ML) INTRAVENOUS
Status: DISCONTINUED | OUTPATIENT
Start: 2022-02-07 | End: 2022-02-07 | Stop reason: HOSPADM

## 2022-02-07 RX ORDER — SODIUM CHLORIDE 9 MG/ML
INJECTION, SOLUTION INTRAVENOUS
Status: DISCONTINUED | OUTPATIENT
Start: 2022-02-07 | End: 2022-02-07 | Stop reason: HOSPADM

## 2022-02-07 RX ORDER — SODIUM CHLORIDE 9 MG/ML
INJECTION, SOLUTION INTRAVENOUS
Status: DISCONTINUED | OUTPATIENT
Start: 2022-02-07 | End: 2022-02-08 | Stop reason: HOSPADM

## 2022-02-07 RX ORDER — PROPOFOL 10 MG/ML
INJECTION, EMULSION INTRAVENOUS AS NEEDED
Status: DISCONTINUED | OUTPATIENT
Start: 2022-02-07 | End: 2022-02-07 | Stop reason: HOSPADM

## 2022-02-07 RX ORDER — KETAMINE HCL 50MG/ML(1)
SYRINGE (ML) INTRAVENOUS AS NEEDED
Status: DISCONTINUED | OUTPATIENT
Start: 2022-02-07 | End: 2022-02-07 | Stop reason: HOSPADM

## 2022-02-07 RX ORDER — INSULIN GLARGINE 100 [IU]/ML
30 INJECTION, SOLUTION SUBCUTANEOUS EVERY 12 HOURS
Status: DISCONTINUED | OUTPATIENT
Start: 2022-02-07 | End: 2022-02-08 | Stop reason: HOSPADM

## 2022-02-07 RX ORDER — LIDOCAINE HYDROCHLORIDE 20 MG/ML
INJECTION, SOLUTION EPIDURAL; INFILTRATION; INTRACAUDAL; PERINEURAL AS NEEDED
Status: DISCONTINUED | OUTPATIENT
Start: 2022-02-07 | End: 2022-02-07 | Stop reason: HOSPADM

## 2022-02-07 RX ORDER — INSULIN GLARGINE 100 [IU]/ML
12 INJECTION, SOLUTION SUBCUTANEOUS
Status: DISCONTINUED | OUTPATIENT
Start: 2022-02-07 | End: 2022-02-07

## 2022-02-07 RX ORDER — MIDAZOLAM HYDROCHLORIDE 1 MG/ML
INJECTION, SOLUTION INTRAMUSCULAR; INTRAVENOUS AS NEEDED
Status: DISCONTINUED | OUTPATIENT
Start: 2022-02-07 | End: 2022-02-07 | Stop reason: HOSPADM

## 2022-02-07 RX ORDER — FENTANYL CITRATE 50 UG/ML
INJECTION, SOLUTION INTRAMUSCULAR; INTRAVENOUS AS NEEDED
Status: DISCONTINUED | OUTPATIENT
Start: 2022-02-07 | End: 2022-02-07 | Stop reason: HOSPADM

## 2022-02-07 RX ADMIN — Medication 12 UNITS: at 15:00

## 2022-02-07 RX ADMIN — VANCOMYCIN HYDROCHLORIDE 1500 MG: 10 INJECTION, POWDER, LYOPHILIZED, FOR SOLUTION INTRAVENOUS at 16:01

## 2022-02-07 RX ADMIN — VANCOMYCIN HYDROCHLORIDE 1500 MG: 10 INJECTION, POWDER, LYOPHILIZED, FOR SOLUTION INTRAVENOUS at 05:20

## 2022-02-07 RX ADMIN — Medication 6 UNITS: at 12:00

## 2022-02-07 RX ADMIN — IPRATROPIUM BROMIDE AND ALBUTEROL 1 PUFF: 20; 100 SPRAY, METERED RESPIRATORY (INHALATION) at 15:53

## 2022-02-07 RX ADMIN — FENTANYL CITRATE 50 MCG: 50 INJECTION, SOLUTION INTRAMUSCULAR; INTRAVENOUS at 18:08

## 2022-02-07 RX ADMIN — MIDAZOLAM HYDROCHLORIDE 2 MG: 2 INJECTION, SOLUTION INTRAMUSCULAR; INTRAVENOUS at 17:31

## 2022-02-07 RX ADMIN — IPRATROPIUM BROMIDE AND ALBUTEROL 1 PUFF: 20; 100 SPRAY, METERED RESPIRATORY (INHALATION) at 08:00

## 2022-02-07 RX ADMIN — BUDESONIDE AND FORMOTEROL FUMARATE DIHYDRATE 2 PUFF: 160; 4.5 AEROSOL RESPIRATORY (INHALATION) at 08:02

## 2022-02-07 RX ADMIN — SODIUM CHLORIDE: 9 INJECTION, SOLUTION INTRAVENOUS at 17:18

## 2022-02-07 RX ADMIN — PROPOFOL 75 MCG/KG/MIN: 10 INJECTION, EMULSION INTRAVENOUS at 17:36

## 2022-02-07 RX ADMIN — LIDOCAINE HYDROCHLORIDE 80 MG: 20 INJECTION, SOLUTION EPIDURAL; INFILTRATION; INTRACAUDAL; PERINEURAL at 17:35

## 2022-02-07 RX ADMIN — PROPOFOL 30 MG: 10 INJECTION, EMULSION INTRAVENOUS at 17:36

## 2022-02-07 RX ADMIN — DEXAMETHASONE SODIUM PHOSPHATE 10 MG: 4 INJECTION, SOLUTION INTRAMUSCULAR; INTRAVENOUS at 15:49

## 2022-02-07 RX ADMIN — VANCOMYCIN HYDROCHLORIDE 1500 MG: 10 INJECTION, POWDER, LYOPHILIZED, FOR SOLUTION INTRAVENOUS at 17:36

## 2022-02-07 RX ADMIN — Medication 50 MG: at 17:35

## 2022-02-07 RX ADMIN — FENTANYL CITRATE 50 MCG: 50 INJECTION, SOLUTION INTRAMUSCULAR; INTRAVENOUS at 18:03

## 2022-02-07 NOTE — ANESTHESIA PREPROCEDURE EVALUATION
Relevant Problems   RESPIRATORY SYSTEM   (+) Pneumonia due to COVID-19 virus       Anesthetic History   No history of anesthetic complications            Review of Systems / Medical History  Patient summary reviewed, nursing notes reviewed and pertinent labs reviewed    Pulmonary            Asthma (worsened since covid this admission, prior well-controlled)        Neuro/Psych   Within defined limits           Cardiovascular  Within defined limits                     GI/Hepatic/Renal  Within defined limits              Endo/Other    Diabetes: poorly controlled, type 2         Other Findings              Physical Exam    Airway  Mallampati: III  TM Distance: 4 - 6 cm  Neck ROM: normal range of motion, short neck   Mouth opening: Normal     Cardiovascular    Rhythm: regular  Rate: normal         Dental  No notable dental hx       Pulmonary  Breath sounds clear to auscultation               Abdominal  GI exam deferred       Other Findings            Anesthetic Plan    ASA: 3  Anesthesia type: MAC and general - backup          Induction: Intravenous  Anesthetic plan and risks discussed with: Patient

## 2022-02-07 NOTE — ANESTHESIA POSTPROCEDURE EVALUATION
Procedure(s):  INCISION AND DRAINAGE LEFT BREAST.     MAC    Anesthesia Post Evaluation      Multimodal analgesia: multimodal analgesia used between 6 hours prior to anesthesia start to PACU discharge  Patient location during evaluation: PACU  Patient participation: complete - patient participated  Level of consciousness: sleepy but conscious  Pain management: adequate  Airway patency: patent  Anesthetic complications: no  Cardiovascular status: acceptable  Respiratory status: acceptable  Hydration status: acceptable  Post anesthesia nausea and vomiting:  controlled  Final Post Anesthesia Temperature Assessment:  Normothermia (36.0-37.5 degrees C)      INITIAL Post-op Vital signs:   Vitals Value Taken Time   /77 02/07/22 1826   Temp     Pulse 66 02/07/22 1826   Resp 14 02/07/22 1826   SpO2 99 % 02/07/22 1826

## 2022-02-07 NOTE — PROGRESS NOTES
[facility]  Progress Note    Patient: Iván Vilchis Age: 23 y.o. : 1995 MR#: [id number] SSN: [id number]  Date/Time: 2022     Subjective:     Accu-Chek 271 - [phone]. Patient seen and examined at bedside, she reports she is hungry and wants to go to OR soon. Nursing change dressing yesterday evening, still draining. Assessment/Plan:     1. COVID-19 pneumonia-continue IV Decadron. s/p Tocilizumab. antibiotics completed. Monitor inflammatory markers. pulmonology follows. Needs repeat imaging in 6 weeks. 2. Acute hypoxic respiratory failure secondary to COVID-19 pneumonia-continue supplemental oxygen on 4 L. Pulmonology follows. 3. History of neuropathy-continue Neurontin and Cymbalta. 4. DM2 -A1c 12, continue Lantus (reduced dose tonight), and ssi, diabetic education on board. Will need insulin at discharge. Outpatient follow-up with nutritionist.  5. Super morbid obesity Body mass index is 75.53 kg/m². 6. Poor vision. Outpatient follow-up with [facility]. 7. Lower extremity edema. Lasix  8. Tobacco use disorder. Smoking cessation education  9. Elevated TSH, 3.98, normal T4. Repeat TFTs 4 to 6 weeks in the outpatient setting. 10. Not immunized for covid. 11. Abscess under left breast -follow wound culture. OR today, surgery input appreciated. ID follows. 12. Boil to anterior thigh, improving. DVT prophylaxis-Lovenox, increased dose. Full code. Assess home oxygen needs prior to discharge. I discussed case with Dr. Nehal Swain. David Zendejas. Discussed on IDT.        Adonis Agrawal MD  23      Case discussed with:  [x]Patient  [x]Family  [x]Nursing  [x]Case Management  DVT Prophylaxis:  [x]Lovenox  []Hep SQ  []SCDs  []Coumadin   []On Heparin gtt    Objective:   VS:   Visit Vitals  /68 (BP 1 Location: Right upper arm, BP Patient Position: At rest)   Pulse 60   Temp 97.7 °F (36.5 °C)   Resp 20   Ht 5' 4\" (1.626 m)   Wt (!) 199.6 kg (440 lb)   SpO2 96%   Breastfeeding No   BMI 75.53 kg/m²      Tmax/24hrs: Temp (24hrs), Av.9 °F (36.6 °C), Min:97.6 °F (36.4 °C), Max:98.5 °F (36.9 °C)  IOBRIEF  No intake or output data in the 24 hours ending 22 0852    General: Awake and alert, found sitting up in chair speaking in full sentences on room air. HEENT: PERRLA, anicteric sclerae. Pulmonary: Decreased breath sounds at bases  Cardiovascular: Regular rate and Rhythm. GI:  Soft, Non distended, Non tender. Nabs  Extremities: Lower extremity edema bilaterally. Neurologic: Alert and oriented X 4. No acute neuro deficits. Skin: 3 cm area of erythema to right upper anterior thigh, no drainage. Copious drainage of rosemarie pus from under left breast, with surrounding erythema, induration, warmth.     Medications:   Current Facility-Administered Medications   Medication Dose Route Frequency    traMADoL (ULTRAM) tablet 50 mg  50 mg Oral Q8H PRN    vancomycin (VANCOCIN) 1500 mg in  ml infusion  1,500 mg IntraVENous Q8H    nystatin (MYCOSTATIN) 100,000 unit/gram powder   Topical BID    [Held by provider] insulin glargine (LANTUS) injection 45 Units  45 Units SubCUTAneous Q12H    furosemide (LASIX) tablet 20 mg  20 mg Oral DAILY    ipratropium-albuterol (COMBIVENT RESPIMAT) 20 mcg-100 mcg inhalation spray  1 Puff Inhalation Q6H RT    insulin lispro (HUMALOG) injection 12 Units  12 Units SubCUTAneous TIDAC    dexamethasone (DECADRON) 4 mg/mL injection 10 mg  10 mg IntraVENous Q24H    budesonide-formoteroL (SYMBICORT) 160-4.5 mcg/actuation HFA inhaler 2 Puff  2 Puff Inhalation BID RT    sodium chloride (NS) flush 5-10 mL  5-10 mL IntraVENous PRN    insulin lispro (HUMALOG) injection   SubCUTAneous AC&HS    glucose chewable tablet 16 g  16 g Oral PRN    glucagon (GLUCAGEN) injection 1 mg  1 mg IntraMUSCular PRN    dextrose 10% infusion 125-250 mL  125-250 mL IntraVENous PRN    acetaminophen (TYLENOL) tablet 500 mg  500 mg Oral Q6H PRN    ondansetron (ZOFRAN) injection 4 mg  4 mg IntraVENous Q4H PRN    DULoxetine (CYMBALTA) capsule 30 mg  30 mg Oral DAILY    gabapentin (NEURONTIN) capsule 100 mg  100 mg Oral TID    omega-3 acid ethyl esters (LOVAZA) capsule 1,000 mg  1 g Oral BID WITH MEALS    hydrALAZINE (APRESOLINE) tablet 25 mg  25 mg Oral TID PRN       Imaging:   XR Results (most recent):  Results from Hospital Encounter encounter on 01/30/22    XR CHEST PORT    Narrative  EXAM: XR CHEST PORT    INDICATION: 26 years Female. cough. ADDITIONAL HISTORY: None. TECHNIQUE: Frontal view of the chest.    COMPARISON: 7/19/2021    FINDINGS:    Underpenetration of the lung bases. Obscuration of the left heart border and  left hemidiaphragm. The cardiac silhouette appears similar to prior. Dense opacity is at the left  lung base, blunting of the left costophrenic sulcus. Hazy opacity at the right  lung base. Diffuse bilateral interstitial opacities within the left greater than  right lungs. Additional airspace opacity at the peripheral aspect of the left  mid/upper lung zone. The right costophrenic sulcus is sharp. No definite pneumothorax. No acute osseous abnormality appreciated. Impression  1. Multifocal patchy airspace opacities involving the left greater than right  lung bases as well as the left midlung zone. Background of diffuse bilateral  interstitial opacities. Findings are most suspicious for multifocal pneumonia,  possible Covid 19 pneumonia; although other infectious or inflammatory process  is possible. Recommend radiographic follow-up within 6-8 weeks after appropriate  medical therapy and imaging follow-up until clearance. 2.  Possible left pleural effusion.        CT Results (most recent):  Results from [place] encounter on 01/30/22    CTA CHEST W OR W WO CONT    Narrative  CTA CHEST PULMONARY EMBOLISM PROTOCOL    INDICATION: Morbid obesity, asthma with shortness of breath, wheezing, diarrhea,  cough, headache, myalgias, decreased appetite, loss of taste and smell, nausea,  vomiting, question of Covid. Question pulmonary embolism. TECHNIQUE: Thin collimation axial images obtained through the level of the  pulmonary arteries with additional imaging through the chest following the  uneventful administration of intravenous contrast.  Images reconstructed into  three dimensional coronal and sagittal projections for complete evaluation of  the tortuous and overlapping pulmonary vascular structures and to reduce patient  radiation dose. All CT scans at this facility are performed using dose optimization technique as  appropriate to a performed exam, to include automated exposure control,  adjustment of the mA and/or kV according to patient size (including appropriate  matching first site-specific examinations), or use of iterative reconstruction  technique. COMPARISON: 2/1/2021    FINDINGS: Evaluation limited by body habitus. There is suboptimal contrast density to assess for chronic filling defects and  respiratory motion also limits assessment. No visible pulmonary emboli, though  many of the segments are nondiagnostic. Thyroid: Unremarkable in its visualized aspects. Pericardium/ Heart: No significant effusion. Aorta/ Vessels: No aneurysm or dissection. Lymph Nodes: 1.1 cm left periaortic node. 1.2 cm subcarinal node. 1.4 cm right  hilar node. .    Lungs: There are bilateral dense lung consolidations, left greater than right. The majority of the left lung is affected. Large consolidation in the right  middle lobe. Pleura: Trace bilateral pleural effusions. No pneumothorax. Upper Abdomen: Hepatic steatosis. The spleen is not completely included in the  field-of-view but may be enlarged. Bones/soft tissues: Degenerative disc disease. .    Impression  1. No visible acute pulmonary emboli, though several of the segments are  nondiagnostic secondary to respiratory motion.   2.  Multifocal pneumonia which may be bacterial or viral. If viral, is more  advanced given the dense consolidations. 3.  Trace bilateral pleural effusions which are atypical in Covid 19.  4.  Mild lymphadenopathy, likely reactive, also atypical in Covid 19.  5.  Hepatic steatosis. 6.  Incompletely visualized but potential splenomegaly. Labs:    Recent Results (from the past 48 hour(s))   GLUCOSE, POC    Collection Time: 02/05/22 11:43 AM   Result Value Ref Range    Glucose (POC) 165 (H) 70 - 110 mg/dL   GLUCOSE, POC    Collection Time: 02/05/22  3:36 PM   Result Value Ref Range    Glucose (POC) 232 (H) 70 - 110 mg/dL   CULTURE, WOUND W GRAM STAIN    Collection Time: 02/05/22  4:47 PM    Specimen: Breast cyst, Left;  Wound   Result Value Ref Range    Special Requests: NO SPECIAL REQUESTS      GRAM STAIN 1+ WBCS SEEN      GRAM STAIN FEW GRAM POSITIVE COCCI IN PAIRS      Culture result: PENDING    GLUCOSE, POC    Collection Time: 02/05/22 10:29 PM   Result Value Ref Range    Glucose (POC) 307 (H) 70 - 110 mg/dL   C REACTIVE PROTEIN, QT    Collection Time: 02/06/22 12:51 AM   Result Value Ref Range    C-Reactive protein <0.3 0 - 0.3 mg/dL   PROCALCITONIN    Collection Time: 02/06/22 12:51 AM   Result Value Ref Range    Procalcitonin <0.05 ng/mL   D DIMER    Collection Time: 02/06/22 12:51 AM   Result Value Ref Range    D DIMER 0.51 (H) <0.46 ug/ml(FEU)   CBC WITH AUTOMATED DIFF    Collection Time: 02/06/22 12:51 AM   Result Value Ref Range    WBC 12.5 4.6 - 13.2 K/uL    RBC 4.86 4.20 - 5.30 M/uL    HGB 13.7 12.0 - 16.0 g/dL    HCT 43.5 35.0 - 45.0 %    MCV 89.5 78.0 - 100.0 FL    MCH 28.2 24.0 - 34.0 PG    MCHC 31.5 31.0 - 37.0 g/dL    RDW 13.8 11.6 - 14.5 %    PLATELET 604 649 - 049 K/uL    MPV 9.7 9.2 - 11.8 FL    NRBC 0.0 0  WBC    ABSOLUTE NRBC 0.00 0.00 - 0.01 K/uL    NEUTROPHILS 75 (H) 40 - 73 %    LYMPHOCYTES 16 (L) 21 - 52 %    MONOCYTES 9 3 - 10 %    EOSINOPHILS 0 0 - 5 %    BASOPHILS 0 0 - 2 %    IMMATURE GRANULOCYTES 0 %    ABS. NEUTROPHILS 9.4 (H) 1.8 - 8.0 K/UL    ABS. LYMPHOCYTES 2.0 0.9 - 3.6 K/UL    ABS. MONOCYTES 1.1 0.05 - 1.2 K/UL    ABS. EOSINOPHILS 0.0 0.0 - 0.4 K/UL    ABS. BASOPHILS 0.0 0.0 - 0.1 K/UL    ABS. IMM.  GRANS. 0.0 K/UL    DF MANUAL      PLATELET COMMENTS ADEQUATE PLATELETS      RBC COMMENTS NORMOCYTIC, NORMOCHROMIC     METABOLIC PANEL, BASIC    Collection Time: 02/06/22 12:51 AM   Result Value Ref Range    Sodium 133 (L) 136 - 145 mmol/L    Potassium 3.9 3.5 - 5.5 mmol/L    Chloride 99 (L) 100 - 111 mmol/L    CO2 25 21 - 32 mmol/L    Anion gap 9 3.0 - 18 mmol/L    Glucose 301 (H) 74 - 99 mg/dL    BUN 20 (H) 7.0 - 18 MG/DL    Creatinine 0.73 0.6 - 1.3 MG/DL    BUN/Creatinine ratio 27 (H) 12 - 20      GFR est AA >60 >60 ml/min/1.73m2    GFR est non-AA >60 >60 ml/min/1.73m2    Calcium 8.2 (L) 8.5 - 10.1 MG/DL   PHOSPHORUS    Collection Time: 02/06/22 12:51 AM   Result Value Ref Range    Phosphorus 3.5 2.5 - 4.9 MG/DL   PROTHROMBIN TIME + INR    Collection Time: 02/06/22 12:51 AM   Result Value Ref Range    Prothrombin time 13.4 11.5 - 15.2 sec    INR 1.0 0.8 - 1.2     MAGNESIUM    Collection Time: 02/06/22 12:51 AM   Result Value Ref Range    Magnesium 2.0 1.6 - 2.3 mg/dL   GLUCOSE, POC    Collection Time: 02/06/22  3:23 AM   Result Value Ref Range    Glucose (POC) 295 (H) 70 - 110 mg/dL   GLUCOSE, POC    Collection Time: 02/06/22  7:39 AM   Result Value Ref Range    Glucose (POC) 225 (H) 70 - 110 mg/dL   GLUCOSE, POC    Collection Time: 02/06/22 12:14 PM   Result Value Ref Range    Glucose (POC) 232 (H) 70 - 110 mg/dL   GLUCOSE, POC    Collection Time: 02/06/22  5:01 PM   Result Value Ref Range    Glucose (POC) 334 (H) 70 - 110 mg/dL   GLUCOSE, POC    Collection Time: 02/06/22  9:12 PM   Result Value Ref Range    Glucose (POC) 282 (H) 70 - 110 mg/dL   C REACTIVE PROTEIN, QT    Collection Time: 02/07/22  1:00 AM   Result Value Ref Range    C-Reactive protein <0.3 0 - 0.3 mg/dL   PROCALCITONIN Collection Time: 02/07/22  1:00 AM   Result Value Ref Range    Procalcitonin <0.05 ng/mL   D DIMER    Collection Time: 02/07/22  1:00 AM   Result Value Ref Range    D DIMER 0.37 <0.46 ug/ml(FEU)   METABOLIC PANEL, BASIC    Collection Time: 02/07/22  1:00 AM   Result Value Ref Range    Sodium 133 (L) 136 - 145 mmol/L    Potassium 4.0 3.5 - 5.5 mmol/L    Chloride 100 100 - 111 mmol/L    CO2 26 21 - 32 mmol/L    Anion gap 7 3.0 - 18 mmol/L    Glucose 321 (H) 74 - 99 mg/dL    BUN 23 (H) 7.0 - 18 MG/DL    Creatinine 0.67 0.6 - 1.3 MG/DL    BUN/Creatinine ratio 34 (H) 12 - 20      GFR est AA >60 >60 ml/min/1.73m2    GFR est non-AA >60 >60 ml/min/1.73m2    Calcium 9.0 8.5 - 10.1 MG/DL   CBC WITH AUTOMATED DIFF    Collection Time: 02/07/22  1:00 AM   Result Value Ref Range    WBC 12.9 4.6 - 13.2 K/uL    RBC 4.78 4.20 - 5.30 M/uL    HGB 13.2 12.0 - 16.0 g/dL    HCT 42.2 35.0 - 45.0 %    MCV 88.3 78.0 - 100.0 FL    MCH 27.6 24.0 - 34.0 PG    MCHC 31.3 31.0 - 37.0 g/dL    RDW 13.7 11.6 - 14.5 %    PLATELET 161 727 - 254 K/uL    MPV 9.7 9.2 - 11.8 FL    NRBC 0.0 0  WBC    ABSOLUTE NRBC 0.00 0.00 - 0.01 K/uL    NEUTROPHILS 80 (H) 40 - 73 %    LYMPHOCYTES 12 (L) 21 - 52 %    MONOCYTES 8 3 - 10 %    EOSINOPHILS 0 0 - 5 %    BASOPHILS 0 0 - 2 %    IMMATURE GRANULOCYTES 0 %    ABS. NEUTROPHILS 10.4 (H) 1.8 - 8.0 K/UL    ABS. LYMPHOCYTES 1.5 0.9 - 3.6 K/UL    ABS. MONOCYTES 1.0 0.05 - 1.2 K/UL    ABS. EOSINOPHILS 0.0 0.0 - 0.4 K/UL    ABS. BASOPHILS 0.0 0.0 - 0.1 K/UL    ABS. IMM.  GRANS. 0.0 K/UL    DF MANUAL      PLATELET COMMENTS ADEQUATE PLATELETS      RBC COMMENTS NORMOCYTIC, NORMOCHROMIC     GLUCOSE, POC    Collection Time: 02/07/22  8:05 AM   Result Value Ref Range    Glucose (POC) 271 (H) 70 - 110 mg/dL       Signed By: Keila Matthews MD     February 7, 2022      I spent 25 minutes with the patient in face-to-face consultation, of which greater than 50% was spent in counseling and coordination of care as described above    Disclaimer: Sections of this note are dictated using utilizing voice recognition software. Minor typographical errors may be present. If questions arise, please do not hesitate to contact me or call our department.

## 2022-02-07 NOTE — PROGRESS NOTES
Late entry @36 Seen and Assessed Ms. Callie Candelario in [address] [place] [address] room 202. She is alert and oriented in no apparent respiratory distress on Room Air. Consents verified and is completed. Transported via bed to [place] [id number]  Protocol for droplet plus isolation. In OR [address].

## 2022-02-07 NOTE — PROGRESS NOTES
Infectious Disease progress Note  reconsulted on 2/6 for left breast abscess      Reason: COVID-19 pneumonia, hypoxic respiratory failure    Current abx Prior abx     Vancomycin since 2/5 Cefazolin on 2/6  Ceftriaxone 1/30-1/31, azithromycin  1/30/2022-2/1     Lines:       Assessment :    23 y.o.  female with h/o uncontrolled type 2 DM, obesity presented to the emergency room on 1/30/22 with complaint of worsening shortness of breath, cough and hemoptysis    Unvaccinated for COVID-19    Clinical presentation consistent with acute hypoxic respiratory failure-present on admission due to confirmed COVID-19 pneumonia    Status post high-dose Decadron since 1/30/2022  S/p tocilizumab on 1/31/22    Now with pain left breast, purulent drainage c/w left breast abscess. Surgery f/u appreciated. Plans for I&D noted  Wound cx: gpc, mixed skin elif    Right thigh boil per report- no evidence of induration or swelling noted on today's exam    Recommendations:    1. Continue Decadron  10 mg IV q 24 hour till 2/8/22  2. Recommend vancomycin for now. 3. F/u wound cx, surgery recommendations &  intra op findings  4. Monitor RLE for abscess         Above plan was discussed in details with patient,  primary team.    HPI:    States that she had left breast pain for quite some time but did not reported initially. There was a boil on the left breast which ruptured recently draining puslike material.  She now has ulcer on the left breast.  Complains of pain right lower abdominal  fold denies any headaches or dizziness currently. No syncope. No soreness of throat. No problems swallowing. Denies any tingling or numbness. No chest pain or abdominal pain. No nausea or vomiting. No past medical history on file.     Past Surgical History:   Procedure Laterality Date    HX APPENDECTOMY         Current Discharge Medication List      CONTINUE these medications which have NOT CHANGED    Details   DULoxetine (Cymbalta) 60 mg capsule Take 60 mg by mouth daily. gabapentin (Neurontin) 100 mg capsule Take 100 mg by mouth three (3) times daily. acetaminophen (TYLENOL) 325 mg tablet Take 2 Tabs by mouth every four (4) hours as needed for Pain. Qty: 20 Tab, Refills: 0      LORazepam (ATIVAN) 1 mg tablet Take 0.5 Tabs by mouth every eight (8) hours as needed for Anxiety. Max Daily Amount: 1.5 mg.  Qty: 30 Tab, Refills: 0    Associated Diagnoses: Anxiety      ibuprofen (MOTRIN) 800 mg tablet Take 1 Tab by mouth every eight (8) hours as needed for Pain.   Qty: 30 Tab, Refills: 1             Current Facility-Administered Medications   Medication Dose Route Frequency    traMADoL (ULTRAM) tablet 50 mg  50 mg Oral Q8H PRN    vancomycin (VANCOCIN) 1500 mg in  ml infusion  1,500 mg IntraVENous Q8H    nystatin (MYCOSTATIN) 100,000 unit/gram powder   Topical BID    [Held by provider] insulin glargine (LANTUS) injection 45 Units  45 Units SubCUTAneous Q12H    furosemide (LASIX) tablet 20 mg  20 mg Oral DAILY    ipratropium-albuterol (COMBIVENT RESPIMAT) 20 mcg-100 mcg inhalation spray  1 Puff Inhalation Q6H RT    insulin lispro (HUMALOG) injection 12 Units  12 Units SubCUTAneous TIDAC    dexamethasone (DECADRON) 4 mg/mL injection 10 mg  10 mg IntraVENous Q24H    budesonide-formoteroL (SYMBICORT) 160-4.5 mcg/actuation HFA inhaler 2 Puff  2 Puff Inhalation BID RT    sodium chloride (NS) flush 5-10 mL  5-10 mL IntraVENous PRN    insulin lispro (HUMALOG) injection   SubCUTAneous AC&HS    glucose chewable tablet 16 g  16 g Oral PRN    glucagon (GLUCAGEN) injection 1 mg  1 mg IntraMUSCular PRN    dextrose 10% infusion 125-250 mL  125-250 mL IntraVENous PRN    acetaminophen (TYLENOL) tablet 500 mg  500 mg Oral Q6H PRN    ondansetron (ZOFRAN) injection 4 mg  4 mg IntraVENous Q4H PRN    DULoxetine (CYMBALTA) capsule 30 mg  30 mg Oral DAILY    gabapentin (NEURONTIN) capsule 100 mg  100 mg Oral TID    omega-3 acid ethyl esters (LOVAZA) capsule 1,000 mg  1 g Oral BID WITH MEALS    hydrALAZINE (APRESOLINE) tablet 25 mg  25 mg Oral TID PRN       Allergies: Patient has no known allergies. No family history on file. Social History     Socioeconomic History    Marital status:      Spouse name: Not on file    Number of children: Not on file    Years of education: Not on file    Highest education level: Not on file   Occupational History    Not on file   Tobacco Use    Smoking status: Current Every Day Smoker    Smokeless tobacco: Never Used   Substance and Sexual Activity    Alcohol use: Not Currently    Drug use: Not Currently    Sexual activity: Not on file   Other Topics Concern    Not on file   Social History Narrative    Not on file     Social Determinants of Health     Financial Resource Strain:     Difficulty of Paying Living Expenses: Not on file   Food Insecurity:     Worried About Running Out of Food in the Last Year: Not on file    Tamar of Food in the Last Year: Not on file   Transportation Needs:     Lack of Transportation (Medical): Not on file    Lack of Transportation (Non-Medical):  Not on file   Physical Activity:     Days of Exercise per Week: Not on file    Minutes of Exercise per Session: Not on file   Stress:     Feeling of Stress : Not on file   Social Connections:     Frequency of Communication with Friends and Family: Not on file    Frequency of Social Gatherings with Friends and Family: Not on file    Attends Faith Services: Not on file    Active Member of Clubs or Organizations: Not on file    Attends Club or Organization Meetings: Not on file    Marital Status: Not on file   Intimate Partner Violence:     Fear of Current or Ex-Partner: Not on file    Emotionally Abused: Not on file    Physically Abused: Not on file    Sexually Abused: Not on file   Housing Stability:     Unable to Pay for Housing in the Last Year: Not on file    Number of [place] in the Last Year: Not on file    Unstable Housing in the Last Year: Not on file     Social History     Tobacco Use   Smoking Status Current Every Day Smoker   Smokeless Tobacco Never Used        Temp (24hrs), Av.9 °F (36.6 °C), Min:97.4 °F (36.3 °C), Max:98.5 °F (36.9 °C)    Visit Vitals  BP (!) 156/84 (BP 1 Location: Left arm, BP Patient Position: At rest)   Pulse 66   Temp 98.5 °F (36.9 °C)   Resp 19   Ht 5' 4\" (1.626 m)   Wt (!) 199.6 kg (440 lb)   SpO2 94%   Breastfeeding No   BMI 75.53 kg/m²       ROS: 12 point ROS obtained in details. Pertinent positives as mentioned in HPI,   otherwise negative    Physical Exam:    Vitals signs and nursing note reviewed. Constitutional:       General: He is not in acute distress. Appearance: He is well-developed. HENT:      Head: Normocephalic. Eyes:      Conjunctiva/sclera: Conjunctivae normal.      Neck:      Musculoskeletal: Normal range of motion and neck supple. Cardiovascular:      Rate and Rhythm: Normal rate and regular rhythm on monitor  Chest:      Bilateral chest movements equal.  Auscultation deferred due to Covid positive. Ulceration on lower aspect of left breast-no drainage, no surrounding erythema  Abdominal:      General: There is no distension. Palpations: Abdomen is soft. Tenderness: Right lower abdominal fold erythema/tenderness. There is no rebound. Musculoskeletal: Normal range of motion. General: No tenderness. Skin:     General: Skin is warm and dry. Findings: No rash. Neurological:      Mental Status:  alert and oriented to person, place, and time. Cranial Nerves: No cranial nerve deficit. Motor: No abnormal muscle tone. Coordination: Coordination normal.   Psychiatric:         Behavior: Behavior normal.         Thought Content:  Thought content normal.         Judgment: Judgment normal.     Labs: Results:   Chemistry Recent Labs     22  0100 22  0051   * 301*   * 133*   K 4.0 3.9  99*   CO2 26 25   BUN 23* 20*   CREA 0.67 0.73   CA 9.0 8.2*   AGAP 7 9   BUCR 34* 27*      CBC w/Diff Recent Labs     02/07/22  0100 02/06/22  0051   WBC 12.9 12.5   RBC 4.78 4.86   HGB 13.2 13.7   HCT 42.2 43.5    379   GRANS 80* 75*   LYMPH 12* 16*   EOS 0 0      Microbiology Recent Labs     02/05/22  1647   CULT PENDING          RADIOLOGY:    All available imaging studies/reports in [facility] for this admission were reviewed      Disclaimer: Sections of this note are dictated utilizing voice recognition software, which may have resulted in some phonetic based errors in grammar and contents. Even though attempts were made to correct all the mistakes, some may have been missed, and remained in the body of the document. If questions arise, please contact our department.     Dr. Makeda Medina, Infectious Disease Specialist  [phone]  February 7, 2022  3:16 PM

## 2022-02-07 NOTE — DIABETES MGMT
Diabetes/ Glycemic Control Plan of Care    Assessment:   New dx. T2DM with A1c 12%, COVID pneumonia, steroids, super morbid obesity  Continues with daily steroids. Receiving lantus every 12 hours, mealtime and corrective humalog. BG remain elevated. Has been NPO today for OR - she has not received any insulin yet today  Will continue to monitor and follow for DM education -   She has been administering her insulin here     DX:   1. Acute respiratory failure with hypoxia ([address]     2. Pneumonia due to COVID-19 virus     3. Tobacco use disorder     4. Abscess of female breast        Fasting/ Morning blood glucose:   Lab Results   Component Value Date/Time    Glucose 321 (H) 02/07/2022 01:00 AM    Glucose (POC) 218 (H) 02/07/2022 11:53 AM     IV Fluids containing dextrose: no  Steroids:   Rx Glucocorticoids (24h ago, onward)             Start     Dose Route Frequency Ordered Stop    02/04/22 0900  dexamethasone (DECADRON) 4 mg/mL injection 10 mg         10 mg IV EVERY 24 HOURS 02/01/22 1607 02/09/22 0859           Blood glucose values:        Within target range (70-180mg/dL):  no    Current insulin orders:   45 units lantus every 12 hours - currently on hold  Mealtime humalog 12 units  Corrective humalog, very insulin resistant, 4 times daily    Total Daily Dose previous 24 hours = 144 units   75 units lantus  33 units corrective humalog  36 units mealtime humalog    Current A1c:   Lab Results   Component Value Date/Time    Hemoglobin A1c 12.0 (H) 01/31/2022 03:13 AM      equivalent  to ave Blood Glucose of 298 mg/dl for 2-3 months prior to admission  Adequate glycemic control PTA:no    Nutrition/Diet: currently NPO for OR   Active Orders   Diet    DIET NPO      Meal Intake:  Patient Vitals for the past 168 hrs:   % Diet Eaten   02/02/22 1730 76 - 100%   02/02/22 1230 76 - 100%   02/02/22 0830 76 - 100%   02/01/22 2101 76 - 100%     Home diabetes medications:   Key Antihyperglycemic Medications     Patient is on no antihyperglycemic meds. Plan/Goals:   Blood glucose will be within target of 70 - 180 mg/dl within 72 hours  Reinforce dietary and medication compliance at home.         Education:  [x] Refer to Diabetes Education Record                       [] Education not indicated at this time     Saint Ina MPH RN 1 [facility]  Pager [phone]  Office [phone]

## 2022-02-08 LAB
BACTERIA SPEC CULT: NORMAL
GRAM STN SPEC: NORMAL
GRAM STN SPEC: NORMAL
SERVICE CMNT-IMP: NORMAL

## 2022-02-08 NOTE — PROGRESS NOTES
Patient returned to unit alert and oriented X3. Patient denies any pain or discomfort. Dsg to Left breast s/p incision and drainage remains clean dry and  Intact. Patient given dinner tray. 100% of dinner consumed.

## 2022-02-08 NOTE — PROGRESS NOTES
2/7/2022  20:20- Patient signed AMA form at this time with previous nurse, Elizabeth Rodriguez RN. Telemetry removed and IV discontinued. 20:42-- Patient dressed and ambulated off the unit- Leave AMA.

## 2022-02-08 NOTE — OP NOTES
[address] [place] [address] Dr [facility], [address]                                 OPERATIVE REPORT    PATIENT:    Vee Viramontes  MRN:           [id number]   DATE:   2022  BILLIN  ROOM:         ATTENDING:   Sun Vinson MD  DICTATING:   Sun Vinson MD      Preoperative Dx: Left breast abscess    Postoperative Dx: Same     Procedure: Incision and drainage left breast abscess    Surgeon:  Kenneth Martínez MD    Assistant: Gatito Arias SA    Anesthesia: Monitored anesthesia care and local-1% lidocaine with epinephrine and one-to-one solution with 0.25% Marcaine with plain    Findings:   Open wound left breast    Specimen: Anaerobic and aerobic cultures left breast open wound/abscess    EBL: 10 mL    Fluid: 974 mL    Complications:  None    Implants: None    Brief Operative Indication:   Left breast abscess    Description of operation :  The patient was identified in the preoperative area. Informed consent was obtained from the patient. The patient was positioned supine on the table. The skin was prepped with iodine povidone solution and sterile drape applied. Time out was performed. Briefing was performed. The local anesthetic was infiltrated into the skin and subcutaneous tissues. The #15 blade was used to create an which extended from the portion of the wound laterally in elliptical fashion to unroofed the undermined area created by the abscess. The skin and subcutaneous tissue were removed. The resulting wound measured 7 x 3 cm. Healthy granulation tissue comprised wound bed. No lobulations or undrained collections were identified. Pulsavac was used to deliver 3 L of saline to the wound bed. Half-strength Betadine soaked gauze was used to pack the wound which is covered with 4 x 4's ABDs and tape. She tolerated the procedure well.        Disposition: She was stable transport to the recovery.

## 2022-02-08 NOTE — PROGRESS NOTES
Late Entry @0176 Phone report given to Khoa Beebe RN for pt. Dominguez Infante in room 202 of [address] [place] [address].

## 2022-02-08 NOTE — PROGRESS NOTES
Late Entry @ 9374. Ms. Terrence Holm  S/P Incision and Drainage of Left breast abscess , under MAC and Local Anesthesia. Recovery done in OR prior transport to her room, procedure well tolerated and uneventful. She is awake , alert and oriented in no apparent respiratory distress on room air . Transported to [address] [place] [address] in stable condition. Hospital protocol for transport observed. Bedside report to Radha Puri RN. Question, and Clarification provided and  RN assumed care.

## 2022-02-08 NOTE — BRIEF OP NOTE
Brief Postoperative Note    Patient: Rehana Snyder  YOB: 1995  MRN: [id number]    Date of Procedure: 2/7/2022     Pre-Op Diagnosis: left breast abscess    Post-Op Diagnosis: Same as preoperative diagnosis. Procedure(s):  INCISION AND DRAINAGE LEFT BREAST    Surgeon(s):   Shade Ruggiero MD    Surgical Assistant: Surg Asst-1: Azam Wilson    Anesthesia: MAC     Estimated Blood Loss (mL): Minimal    Complications: None    Specimens:   ID Type Source Tests Collected by Time Destination   1 : AEROBIC, ANAEROBIC C//S OF LEFT BREAST ABSCESS Wound Breast CULTURE, ANAEROBIC, CULTURE, WOUND W GRAM STAIN Shade Ruggiero MD 2/7/2022 1834 Microbiology        Implants: * No implants in log *    Drains: * No LDAs found *    Findings: Open wound left breast no pus    Electronically Signed by Triny Gonsalves MD on 2/7/2022 at 7:00 PM

## 2022-02-08 NOTE — ROUTINE PROCESS
1 - Patient arguing on the phone with . When I checking on patient she stated \"she just wanted to go home\". Patient explained the importance of completing medical treatment. She verbalize understanding but she needs to go home. 2040 - Dr. Scott spoke with patient regarding leaving AMA. Patient still wants to leave against medical advise. 2043 - Patient left unit with all belongings.

## 2022-02-09 LAB
BACTERIA SPEC CULT: NORMAL
BACTERIA SPEC CULT: NORMAL
GRAM STN SPEC: NORMAL
GRAM STN SPEC: NORMAL
SERVICE CMNT-IMP: NORMAL
SERVICE CMNT-IMP: NORMAL

## 2022-02-09 NOTE — DISCHARGE SUMMARY
Discharge Summary    Patient: Tamiko Underwood MRN: [id number]  CSN: [id number]    YOB: 1995  Age: 23 y.o. Sex: female    DOA: 1/30/2022 LOS:  LOS: 8 days   Discharge Date: 2/7/2022     Admission Diagnoses: Acute respiratory failure with hypoxia ([facility] [address] [J96.01]  Hypoxia [R09.02]  Pneumonia due to COVID-19 virus [U07.1, J12.82]    Discharge Diagnoses:    Problem List as of 2/7/2022 Never Reviewed          Codes Class Noted - Resolved    Acute respiratory failure with hypoxia [facility]) ICD-10-CM: J96.01  ICD-9-CM: 518.81  1/30/2022 - Present        Hypoxia ICD-10-CM: R09.02  ICD-9-CM: 799.02  1/30/2022 - Present        Pneumonia due to COVID-19 virus ICD-10-CM: U07.1, J12.82  ICD-9-CM: 480.8, 079.89  1/30/2022 - Present              Reason for Admission  26 y.o.  female with h/o uncontrolled type 2 DM, obesity presented to the emergency room on 1/30/22 with complaint of worsening shortness of breath, cough and hemoptysis. Unvaccinated for COVID-19. Discharge Condition: unstable for discharge, left AMA    PHYSICAL EXAM   Visit Vitals  BP (!) 150/79 (BP 1 Location: Right upper arm, BP Patient Position: At rest)   Pulse 72   Temp 97.8 °F (36.6 °C)   Resp 18   Ht 5' 4\" (1.626 m)   Wt (!) 199.6 kg (440 lb)   SpO2 97%   Breastfeeding No   BMI 75.53 kg/m²       General: Awake and alert, found sitting up in chair speaking in full sentences on room air. HEENT: PERRLA, anicteric sclerae. Pulmonary: Decreased breath sounds at bases  Cardiovascular: Regular rate and Rhythm. GI:  Soft, Non distended, Non tender. Nabs  Extremities: Lower extremity edema bilaterally. Neurologic: Alert and oriented X 4. No acute neuro deficits. Skin: 3 cm area of erythema to right upper anterior thigh, no drainage. Copious drainage of rosemarie pus from under left breast, with surrounding erythema, induration, warmth. Hospital Course:   1. COVID-19 pneumonia improving with IV Decadron.  s/p Tocilizumab. antibiotics completed. Monitored inflammatory markers. pulmonology followed. Needs repeat imaging in 6 weeks  2. Acute hypoxic respiratory failure secondary to COVID-19 pneumonia- resolving, on RA. 3. History of neuropathy-continue Neurontin and Cymbalta. 4. DM2 -A1c 12, continue Lantus (reduced dose tonight), and ssi, diabetic education on board. needed insulin at discharge. Outpatient follow-up with nutritionist recommended. 5. Super morbid obesity Body mass index is 75.53 kg/m². 6. Poor vision. Outpatient follow-up with [facility]. 7. Lower extremity edema. Lasix  8. Tobacco use disorder. Smoking cessation education  9. Elevated TSH, 3.98, normal T4. Repeat TFTs 4 to 6 weeks in the outpatient setting. 10. Not immunized for covid. 11. Abscess under left breast -follow wound culture. given iv vancomycin in house. s/p I&D. Patient left w/o rx for antibiotics. 12. Boil to anterior thigh, improving. DVT prophylaxis was given in the form of Lovenox, increased dose. Full code. patient left AMA. Consults: Infectious Disease Dr. Ricardo Aldridge. Procedures  Incision and drainage left breast abscess. Kandace Yu MD. 2/8/2022    Significant Diagnostic Studies: labs:      Ref.  Range 2/7/2022 01:00   WBC Latest Ref Range: 4.6 - 13.2 K/uL 12.9   NRBC Latest Ref Range: 0  WBC 0.0   RBC Latest Ref Range: 4.20 - 5.30 M/uL 4.78   HGB Latest Ref Range: 12.0 - 16.0 g/dL 13.2   HCT Latest Ref Range: 35.0 - 45.0 % 42.2   MCV Latest Ref Range: 78.0 - 100.0 FL 88.3   MCH Latest Ref Range: 24.0 - 34.0 PG 27.6   MCHC Latest Ref Range: 31.0 - 37.0 g/dL 31.3   RDW Latest Ref Range: 11.6 - 14.5 % 13.7   PLATELET Latest Ref Range: 135 - 420 K/uL 335   MPV Latest Ref Range: 9.2 - 11.8 FL 9.7   NEUTROPHILS Latest Ref Range: 40 - 73 % 80 (H)   LYMPHOCYTES Latest Ref Range: 21 - 52 % 12 (L)   MONOCYTES Latest Ref Range: 3 - 10 % 8   EOSINOPHILS Latest Ref Range: 0 - 5 % 0   BASOPHILS Latest Ref Range: 0 - 2 % 0   IMMATURE GRANULOCYTES Latest Units: % 0   DF Latest Units:   MANUAL   ABSOLUTE NRBC Latest Ref Range: 0.00 - 0.01 K/uL 0.00   ABS. NEUTROPHILS Latest Ref Range: 1.8 - 8.0 K/UL 10.4 (H)   ABS. IMM. GRANS. Latest Units: K/UL 0.0   ABS. LYMPHOCYTES Latest Ref Range: 0.9 - 3.6 K/UL 1.5   ABS. MONOCYTES Latest Ref Range: 0.05 - 1.2 K/UL 1.0      Ref.  Range 2/7/2022 01:00   Sodium Latest Ref Range: 136 - 145 mmol/L 133 (L)   Potassium Latest Ref Range: 3.5 - 5.5 mmol/L 4.0   Chloride Latest Ref Range: 100 - 111 mmol/L 100   CO2 Latest Ref Range: 21 - 32 mmol/L 26   Anion gap Latest Ref Range: 3.0 - 18 mmol/L 7   Glucose Latest Ref Range: 74 - 99 mg/dL 321 (H)   BUN Latest Ref Range: 7.0 - 18 MG/DL 23 (H)   Creatinine Latest Ref Range: 0.6 - 1.3 MG/DL 0.67   BUN/Creatinine ratio Latest Ref Range: 12 - 20   34 (H)   Calcium Latest Ref Range: 8.5 - 10.1 MG/DL 9.0   GFR est non-AA Latest Ref Range: >60 ml/min/1.73m2 >60   GFR est AA Latest Ref Range: >60 ml/min/1.73m2 >60   Procalcitonin Latest Units: ng/mL <0.05   C-Reactive protein Latest Ref Range: 0 - 0.3 mg/dL <0.3     Results     Procedure Component Value Units Date/Time    CULTURE, Jordan Reas STAIN [[id number]] Collected: 02/07/22 1834    Order Status: Completed Specimen: Breast Updated: 02/09/22 1423     Special Requests: LEFT BREAST ABSCESS     GRAM STAIN FEW WBCS SEEN         NO ORGANISMS SEEN        Culture result: NO AEROBIC GROWTH ON SOLID MEDIA    CULTURE, ANAEROBIC [[id number]] Collected: 02/07/22 1834    Order Status: Completed Specimen: Breast Updated: 02/09/22 1422     Special Requests: LEFT BREAST ABSCESS     Culture result: FEW Finegoldia magna       CULTURE, Jordan Reas STAIN [[id number]] Collected: 02/05/22 1647    Order Status: Completed Specimen: Wound from Breast cyst, Left Updated: 02/08/22 3325     Special Requests: NO SPECIAL REQUESTS        GRAM STAIN 1+ WBCS SEEN               FEW GRAM POSITIVE COCCI IN PAIRS Culture result:       LIGHT MIXED SKIN ISABELLA ISOLATED          RESPIRATORY VIRUS PANEL W/COVID-19, PCR [[id number]]  (Abnormal) Collected: 01/30/22 2010    Order Status: Completed Specimen: Nasopharyngeal Updated: 01/30/22 2158     Adenovirus Not detected        Coronavirus 229E Not detected        Coronavirus HKU1 Not detected        Coronavirus CVNL63 Not detected        Coronavirus OC43 Not detected        SARS-CoV-2, PCR Detected        Comment: CALLED TO AND CORRECTLY REPEATED BY:  ANGELICA [facility] ED AT 2155 BY KDA 1/30/22          Metapneumovirus Not detected        Rhinovirus and Enterovirus Not detected        Influenza A Not detected        Influenza A, subtype H1 Not detected        Influenza A, subtype H3 Not detected        INFLUENZA A H1N1 PCR Not detected        Influenza B Not detected        Parainfluenza 1 Not detected        Parainfluenza 2 Not detected        Parainfluenza 3 Not detected        Parainfluenza virus 4 Not detected        RSV by PCR Not detected        B. parapertussis, PCR Not detected        Bordetella pertussis - PCR Not detected        Chlamydophila pneumoniae DNA, QL, PCR Not detected        Mycoplasma pneumoniae DNA, QL, PCR Not detected       CULTURE, RESPIRATORY/SPUTUM/BRONCH Silvestre Grams [[id number]] Collected: 01/30/22 1715    Order Status: Canceled Specimen: Sputum     CULTURE, BLOOD [[id number]] Collected: 01/30/22 1615    Order Status: Completed Specimen: Blood Updated: 02/05/22 0659     Special Requests: RAC     Culture result: NO GROWTH 6 DAYS       CULTURE, BLOOD [[id number]]  (Abnormal) Collected: 01/30/22 1600    Order Status: Completed Specimen: Blood Updated: 02/03/22 1008     Special Requests: RAC     GRAM STAIN       ANAEROBIC BOTTLE GRAM POSITIVE COCCI IN GROUPS                  SMEAR CALLED TO AND CORRECTLY REPEATED BY: DR RUBEN VALVERDE ER ON [id number] AT 2117 HRS TO 1396.            Culture result:       ANAEROBIC GRAM POSITIVE COCCI (MULTIPLE COLONY TYPES) GROWING IN 1 OF 2 BOTTLES DRAWN SITE = [facility]          COVID-19 RAPID TEST [[id number]] Collected: 01/30/22 1320    Order Status: Completed Specimen: Nasopharyngeal Updated: 01/30/22 1359     Specimen source Nasopharyngeal        COVID-19 rapid test Not detected        Comment: Rapid Abbott ID Now       Rapid NAAT:  The specimen is NEGATIVE for SARS-CoV-2, the novel coronavirus associated with COVID-19. Negative results should be treated as presumptive and, if inconsistent with clinical signs and symptoms or necessary for patient management, should be tested with an alternative molecular assay. Negative results do not preclude SARS-CoV-2 infection and should not be used as the sole basis for patient management decisions. This test has been authorized by the FDA under an Emergency Use Authorization (EUA) for use by authorized laboratories. Fact sheet for Healthcare Providers: ConventionUpdate.co.nz  Fact sheet for Patients: ConventionUpdate.co.nz       Methodology: Isothermal Nucleic Acid Amplification             IMAGING  CT Results (most recent):  Results from Hospital Encounter encounter on 01/30/22    CTA CHEST W OR W WO CONT    Narrative  CTA CHEST PULMONARY EMBOLISM PROTOCOL    INDICATION: Morbid obesity, asthma with shortness of breath, wheezing, diarrhea,  cough, headache, myalgias, decreased appetite, loss of taste and smell, nausea,  vomiting, question of Covid. Question pulmonary embolism. TECHNIQUE: Thin collimation axial images obtained through the level of the  pulmonary arteries with additional imaging through the chest following the  uneventful administration of intravenous contrast.  Images reconstructed into  three dimensional coronal and sagittal projections for complete evaluation of  the tortuous and overlapping pulmonary vascular structures and to reduce patient  radiation dose.     All CT scans at this facility are performed using dose optimization technique as  appropriate to a performed exam, to include automated exposure control,  adjustment of the mA and/or kV according to patient size (including appropriate  matching first site-specific examinations), or use of iterative reconstruction  technique. COMPARISON: 2/1/2021    FINDINGS: Evaluation limited by body habitus. There is suboptimal contrast density to assess for chronic filling defects and  respiratory motion also limits assessment. No visible pulmonary emboli, though  many of the segments are nondiagnostic. Thyroid: Unremarkable in its visualized aspects. Pericardium/ Heart: No significant effusion. Aorta/ Vessels: No aneurysm or dissection. Lymph Nodes: 1.1 cm left periaortic node. 1.2 cm subcarinal node. 1.4 cm right  hilar node. .    Lungs: There are bilateral dense lung consolidations, left greater than right. The majority of the left lung is affected. Large consolidation in the right  middle lobe. Pleura: Trace bilateral pleural effusions. No pneumothorax. Upper Abdomen: Hepatic steatosis. The spleen is not completely included in the  field-of-view but may be enlarged. Bones/soft tissues: Degenerative disc disease. .    Impression  1. No visible acute pulmonary emboli, though several of the segments are  nondiagnostic secondary to respiratory motion. 2.  Multifocal pneumonia which may be bacterial or viral. If viral, is more  advanced given the dense consolidations. 3.  Trace bilateral pleural effusions which are atypical in Covid 19.  4.  Mild lymphadenopathy, likely reactive, also atypical in Covid 19.  5.  Hepatic steatosis. 6.  Incompletely visualized but potential splenomegaly. XR Results (most recent):  Results from Hospital Encounter encounter on 01/30/22    XR CHEST PORT    Narrative  EXAM: XR CHEST PORT    INDICATION: 26 years Female. cough. ADDITIONAL HISTORY: None.     TECHNIQUE: Frontal view of the chest.    COMPARISON: 7/19/2021    FINDINGS:    Underpenetration of the lung bases. Obscuration of the left heart border and  left hemidiaphragm. The cardiac silhouette appears similar to prior. Dense opacity is at the left  lung base, blunting of the left costophrenic sulcus. Hazy opacity at the right  lung base. Diffuse bilateral interstitial opacities within the left greater than  right lungs. Additional airspace opacity at the peripheral aspect of the left  mid/upper lung zone. The right costophrenic sulcus is sharp. No definite pneumothorax. No acute osseous abnormality appreciated. Impression  1. Multifocal patchy airspace opacities involving the left greater than right  lung bases as well as the left midlung zone. Background of diffuse bilateral  interstitial opacities. Findings are most suspicious for multifocal pneumonia,  possible Covid 19 pneumonia; although other infectious or inflammatory process  is possible. Recommend radiographic follow-up within 6-8 weeks after appropriate  medical therapy and imaging follow-up until clearance. 2.  Possible left pleural effusion.         Discharge Medications:   Patient left AMA

## 2022-02-09 NOTE — DIABETES MGMT
Diabetes Plan of Care    Patient left AMA last evening -  Follow up call made this morning - new dx T2DM  She states she is currently in the ED at [facility] for a wound check for incision and drainage of left breast abscess. She has called her PCP at [facility] for a follow up appointment, hopefully this afternoon. Reviewed with her the A1c of 12% and need for insulin. Stressed that she follow up with her PCP for this and educated about elevated BG and poor wound healing.    During her admission here, she was provided with a Contour meter, insulin administration education and DM educational materials       Regi Rios MPH RN [facility] [place]  Pager [phone]  Office [phone]

## 2022-02-16 ENCOUNTER — OFFICE VISIT (OUTPATIENT)
Dept: SURGERY | Age: 27
End: 2022-02-16
Payer: MEDICAID

## 2022-02-16 VITALS
HEIGHT: 63 IN | SYSTOLIC BLOOD PRESSURE: 134 MMHG | OXYGEN SATURATION: 98 % | TEMPERATURE: 98.3 F | WEIGHT: 293 LBS | RESPIRATION RATE: 17 BRPM | BODY MASS INDEX: 51.91 KG/M2 | HEART RATE: 86 BPM | DIASTOLIC BLOOD PRESSURE: 80 MMHG

## 2022-02-16 DIAGNOSIS — G89.18 POSTOPERATIVE PAIN: Primary | ICD-10-CM

## 2022-02-16 PROCEDURE — 99024 POSTOP FOLLOW-UP VISIT: CPT | Performed by: SURGERY

## 2022-02-16 RX ORDER — FUROSEMIDE 20 MG/1
20 TABLET ORAL DAILY PRN
COMMUNITY
Start: 2022-01-01

## 2022-02-16 RX ORDER — OXYCODONE HYDROCHLORIDE 5 MG/1
5 TABLET ORAL
Qty: 20 TABLET | Refills: 0 | Status: SHIPPED | OUTPATIENT
Start: 2022-02-16 | End: 2022-02-19

## 2022-02-16 RX ORDER — TRAMADOL HYDROCHLORIDE 50 MG/1
50 TABLET ORAL
COMMUNITY
Start: 2022-02-14 | End: 2022-02-16 | Stop reason: ALTCHOICE

## 2022-02-16 NOTE — PROGRESS NOTES
Review of Systems   Constitutional: Negative. HENT: Negative. Eyes: Negative. Respiratory: Negative. Cardiovascular: Positive for leg swelling. Gastrointestinal: Negative. Genitourinary: Negative. Musculoskeletal: Negative. Skin: Negative. Neurological: Negative. Endo/Heme/Allergies: Negative. Psychiatric/Behavioral: Negative.

## 2022-02-16 NOTE — PROGRESS NOTES
[facility]  General Surgery    Name: Yamile Baker MRN: [id number]   : 1995 [facility]   Date: 2022 Admission Date: No admission date for patient encounter. Subjective:  Patient presents today following excision drainage of left breast abscess. She is doing well. She does have pain. Pain in the breast.    Objective:  Vitals:    22 0859   BP: 134/80   Pulse: 86   Resp: 17   Temp: 98.3 °F (36.8 °C)   TempSrc: Temporal   SpO2: 98%   Weight: (!) 205 kg (452 lb)   Height: 5' 3\" (1.6 m)       Physical Exam:    General:    BREASTS:    Left: Open wound measures 5 x 4.7 x 0.2 cm with 80% healthy granulation tissue 20% fibrinous slough no surrounding cellulitis no pus no fluctuance or crepitance. Current Medications:  Current Outpatient Medications   Medication Sig Dispense Refill    furosemide (LASIX) 20 mg tablet Take 20 mg by mouth daily as needed.  oxyCODONE IR (ROXICODONE) 5 mg immediate release tablet Take 1 Tablet by mouth every four (4) hours as needed for Pain for up to 3 days. Max Daily Amount: 30 mg. 20 Tablet 0    metFORMIN (GLUCOPHAGE) 500 mg tablet Take 1 Tablet by mouth two (2) times daily (with meals) for 14 days. 28 Tablet 0    DULoxetine (Cymbalta) 60 mg capsule Take 60 mg by mouth daily.  gabapentin (Neurontin) 100 mg capsule Take 100 mg by mouth three (3) times daily.  acetaminophen (TYLENOL) 325 mg tablet Take 2 Tabs by mouth every four (4) hours as needed for Pain. 20 Tab 0    LORazepam (ATIVAN) 1 mg tablet Take 0.5 Tabs by mouth every eight (8) hours as needed for Anxiety. Max Daily Amount: 1.5 mg. 30 Tab 0    ibuprofen (MOTRIN) 800 mg tablet Take 1 Tab by mouth every eight (8) hours as needed for Pain. 30 Tab 1       Chart and notes reviewed. Data reviewed. I have evaluated and examined the patient.         IMPRESSION:   · Patient left breast.      PLAN:/DISCUSION:   · Continue wound care left breast  · Pain medication prescribed  · Follow-up in 4 weeks        Marie Adams MD

## 2022-03-07 ENCOUNTER — DOCUMENTATION ONLY (OUTPATIENT)
Dept: SURGERY | Age: 27
End: 2022-03-07

## 2022-03-07 NOTE — PROGRESS NOTES
Spoke to Ms Rubén Hernadez regarding a missed appointment on 3/2/22. She says she is now seeing a wound care nurse and wanted to know if she could wait on seeing Dr Ro Rasmussen. Per Dr Zonia Lemos nurse she is okay to follow up whenever she feels like it or PRN. Ms Rubén Hernadez said she would talk to wound care and call us back if she feels the need to reschedule.

## 2022-03-10 ENCOUNTER — TELEPHONE (OUTPATIENT)
Dept: SURGERY | Age: 27
End: 2022-03-10

## 2022-03-10 NOTE — TELEPHONE ENCOUNTER
Pt called stating she had wound debridement today and it hurts really bad. Her PCP Is out on vacation so she can't get a refill of her pain medication states she didn't know what else to do so asking if Dr. Vandana Johnson will please refill her script. She did state that they offered her an appt tomorrow with another DrEdward At her PCP office. Pt states PCP filled her pain meds the last 2 times. Informed pt Dr. Vandana Johnson is in Surgery today and I will send him a message. Pt had I&D of left breast 2/7/2022 by Dr. Vandana Johnson. She is currently being seen by [facility] for wound care follow up. Advised patient to take the appt for tomorrow at her PCP office and if she can't get an appointment to call us back. Pt verbalized understanding and will call back if unable to be seen tomorrow.

## 2022-03-18 PROBLEM — R09.02 HYPOXIA: Status: ACTIVE | Noted: 2022-01-30

## 2022-03-19 PROBLEM — U07.1 PNEUMONIA DUE TO COVID-19 VIRUS: Status: ACTIVE | Noted: 2022-01-30

## 2022-03-19 PROBLEM — J96.01 ACUTE RESPIRATORY FAILURE WITH HYPOXIA (HCC): Status: ACTIVE | Noted: 2022-01-30

## 2022-03-19 PROBLEM — J12.82 PNEUMONIA DUE TO COVID-19 VIRUS: Status: ACTIVE | Noted: 2022-01-30

## 2022-03-21 ENCOUNTER — HOSPITAL ENCOUNTER (EMERGENCY)
Age: 27
Discharge: HOME | End: 2022-03-21
Attending: STUDENT IN AN ORGANIZED HEALTH CARE EDUCATION/TRAINING PROGRAM
Payer: MEDICAID

## 2022-03-21 VITALS
HEART RATE: 93 BPM | TEMPERATURE: 98.7 F | OXYGEN SATURATION: 100 % | SYSTOLIC BLOOD PRESSURE: 180 MMHG | RESPIRATION RATE: 19 BRPM | BODY MASS INDEX: 51.91 KG/M2 | WEIGHT: 293 LBS | HEIGHT: 63 IN | DIASTOLIC BLOOD PRESSURE: 92 MMHG

## 2022-03-21 DIAGNOSIS — K02.9 DENTAL CARIES: ICD-10-CM

## 2022-03-21 DIAGNOSIS — K08.89 PAIN, DENTAL: Primary | ICD-10-CM

## 2022-03-21 PROCEDURE — 74011250637 HC RX REV CODE- 250/637: Performed by: PHYSICIAN ASSISTANT

## 2022-03-21 PROCEDURE — 99283 EMERGENCY DEPT VISIT LOW MDM: CPT

## 2022-03-21 RX ORDER — CEPHALEXIN 500 MG/1
500 CAPSULE ORAL 3 TIMES DAILY
Qty: 21 CAPSULE | Refills: 0 | Status: SHIPPED | OUTPATIENT
Start: 2022-03-21 | End: 2022-03-28

## 2022-03-21 RX ORDER — CEPHALEXIN 250 MG/1
500 CAPSULE ORAL
Status: COMPLETED | OUTPATIENT
Start: 2022-03-21 | End: 2022-03-21

## 2022-03-21 RX ORDER — ACETAMINOPHEN AND CODEINE PHOSPHATE 300; 30 MG/1; MG/1
1 TABLET ORAL
Status: COMPLETED | OUTPATIENT
Start: 2022-03-21 | End: 2022-03-21

## 2022-03-21 RX ORDER — ACETAMINOPHEN AND CODEINE PHOSPHATE 300; 30 MG/1; MG/1
1 TABLET ORAL
Qty: 8 TABLET | Refills: 0 | Status: SHIPPED | OUTPATIENT
Start: 2022-03-21 | End: 2022-03-26

## 2022-03-21 RX ADMIN — ACETAMINOPHEN AND CODEINE PHOSPHATE 1 TABLET: 300; 30 TABLET ORAL at 02:12

## 2022-03-21 RX ADMIN — CEPHALEXIN 500 MG: 250 CAPSULE ORAL at 02:12

## 2022-03-21 NOTE — DISCHARGE INSTRUCTIONS
Wild Pockets Activation    Thank you for requesting access to Wild Pockets. Please follow the instructions below to securely access and download your online medical record. Wild Pockets allows you to send messages to your doctor, view your test results, renew your prescriptions, schedule appointments, and more. How Do I Sign Up? In your internet browser, go to [email or web address].Before the Call  Click on the First Time User? Click Here link in the Sign In box. You will be redirect to the New Member Sign Up page. Enter your Wild Pockets Access Code exactly as it appears below. You will not need to use this code after youve completed the sign-up process. If you do not sign up before the expiration date, you must request a new code. Wild Pockets Access Code: [email or web address]  Expires: 2022  3:37 PM (This is the date your Wild Pockets access code will )    Enter the last four digits of your Social Security Number (xxxx) and Date of Birth (mm/dd/yyyy) as indicated and click Submit. You will be taken to the next sign-up page. Create a Wild Pockets ID. This will be your Wild Pockets login ID and cannot be changed, so think of one that is secure and easy to remember. Create a Wild Pockets password. You can change your password at any time. Enter your Password Reset Question and Answer. This can be used at a later time if you forget your password. Enter your e-mail address. You will receive e-mail notification when new information is available in [address] Click Sign Up. You can now view and download portions of your medical record. Click the [place] link to download a portable copy of your medical information. Additional Information    If you have questions, please visit the Frequently Asked Questions section of the Wild Pockets website at [email or web address] Remember, Wild Pockets is NOT to be used for urgent needs. For medical emergencies, dial 911.

## 2022-03-21 NOTE — ED NOTES
I have reviewed discharge instructions with the patient. The patient verbalized understanding. Pt discharged ambulatory with steady gait noted, stable in no distress.

## 2022-03-21 NOTE — ED PROVIDER NOTES
EMERGENCY DEPARTMENT HISTORY AND PHYSICAL EXAM    Date: 3/21/2022  Patient Name: Felicita Manning    History of Presenting Illness     Chief Complaint   Patient presents with    Dental Pain         History Provided By:pateint    Chief Complaint: dental pain   Duration: 2 days, worsened this evening   Timing: acute  Location: L upper canine region   Quality: sharp shooting pain   Severity moderate to severe  Modifying Factors: motrin tylenol BC powder and orajel have not helped  Associated Symptoms: none       Additional History (Context): Felicita Manning is a 23 y.o. female with PMH asthma and diabetes who presents with c/o dental pain to the left upper canine region for the past 2 days but worsening this evening. Patient states the pain is sharp and shooting into her face. She states she has taken Tylenol Motrin BC powder and Orajel with no relief in her symptoms. Denies fever and chills. Patient states she attempted to call her dentist but was unable to be seen promptly. No other complaints reported at this time. PCP: Daryle Favorite, MD    Current Facility-Administered Medications   Medication Dose Route Frequency Provider Last Rate Last Admin    cephALEXin (KEFLEX) capsule 500 mg  500 mg Oral NOW Anneliese Juan PA-C        acetaminophen-codeine (TYLENOL #3) per tablet 1 Tablet  1 Tablet Oral NOW Anneliese Juan PA-C         Current Outpatient Medications   Medication Sig Dispense Refill    cephALEXin (Keflex) 500 mg capsule Take 1 Capsule by mouth three (3) times daily for 7 days. 21 Capsule 0    acetaminophen-codeine (Tylenol-Codeine #3) 300-30 mg per tablet Take 1 Tablet by mouth every six (6) hours as needed for Pain for up to 5 days. Max Daily Amount: 4 Tablets. 8 Tablet 0    furosemide (LASIX) 20 mg tablet Take 20 mg by mouth daily as needed.  DULoxetine (Cymbalta) 60 mg capsule Take 60 mg by mouth daily.       gabapentin (Neurontin) 100 mg capsule Take 100 mg by mouth three (3) times daily.  acetaminophen (TYLENOL) 325 mg tablet Take 2 Tabs by mouth every four (4) hours as needed for Pain. 20 Tab 0    LORazepam (ATIVAN) 1 mg tablet Take 0.5 Tabs by mouth every eight (8) hours as needed for Anxiety. Max Daily Amount: 1.5 mg. 30 Tab 0    ibuprofen (MOTRIN) 800 mg tablet Take 1 Tab by mouth every eight (8) hours as needed for Pain. 30 Tab 1       Past History     Past Medical History:  Past Medical History:   Diagnosis Date    Asthma     Diabetes ([facility] [address]        Past Surgical History:  Past Surgical History:   Procedure Laterality Date    HX APPENDECTOMY         Family History:  No family history on file. Social History:  Social History     Tobacco Use    Smoking status: Current Every Day Smoker    Smokeless tobacco: Never Used   Vaping Use    Vaping Use: Never used   Substance Use Topics    Alcohol use: Not Currently    Drug use: Not Currently       Allergies:  No Known Allergies      Review of Systems   Review of Systems   Constitutional: Negative. Negative for chills and fever. HENT: Positive for dental problem. Negative for congestion, ear pain and rhinorrhea. Eyes: Negative. Negative for pain and redness. Respiratory: Negative. Negative for cough, shortness of breath, wheezing and stridor. Cardiovascular: Negative. Negative for chest pain and leg swelling. Gastrointestinal: Negative. Negative for abdominal pain, constipation, diarrhea, nausea and vomiting. Genitourinary: Negative. Negative for dysuria and frequency. Musculoskeletal: Negative. Negative for back pain and neck pain. Skin: Negative. Negative for rash and wound. Neurological: Negative. Negative for dizziness, seizures, syncope and headaches. All other systems reviewed and are negative.     All Other Systems Negative  Physical Exam     Vitals:    03/21/22 0051   BP: (!) 180/92   Pulse: 93   Resp: 19   Temp: 98.7 °F (37.1 °C)   SpO2: 100%   Weight: (!) 205 kg (452 lb)   Height: 5' 3\" (1.6 m)     Physical Exam  Vitals and nursing note reviewed. Constitutional:       General: She is in acute distress. Appearance: Normal appearance. She is obese. Comments: Appears mildly distressed   HENT:      Head: Normocephalic and atraumatic. Mouth/Throat:      Mouth: Mucous membranes are moist.     Eyes:      General: No scleral icterus. Right eye: No discharge. Left eye: No discharge. Extraocular Movements: Extraocular movements intact. Conjunctiva/sclera: Conjunctivae normal.   Cardiovascular:      Rate and Rhythm: Normal rate. Pulmonary:      Effort: Pulmonary effort is normal. No respiratory distress. Breath sounds: No stridor. Skin:     General: Skin is warm and dry. Neurological:      General: No focal deficit present. Mental Status: She is alert and oriented to person, place, and time. Mental status is at baseline. Comments: Gait is steady and patient exhibits no evidence of ataxia. Patient is able to ambulate without difficulty. No focal neurological deficit noted. No facial droop, slurred speech, or evidence of altered mentation noted on exam.     Psychiatric:         Mood and Affect: Mood normal.         Behavior: Behavior normal.         Thought Content: Thought content normal.                Diagnostic Study Results     Labs -   No results found for this or any previous visit (from the past 12 hour(s)). Radiologic Studies -   No orders to display     CT Results  (Last 48 hours)    None        CXR Results  (Last 48 hours)    None            Medical Decision Making   I am the first provider for this patient. I reviewed the vital signs, available nursing notes, past medical history, past surgical history, family history and social history. Vital Signs-Reviewed the patient's vital signs. Records Reviewed. Anneliese Juan PA-C     Procedures:  Procedures    Provider Notes (Medical Decision Making):  Impression:  Dental pain    Clinical presentation suggestive of dental pain secondary to dental caries, will plan to d/c with keflex and short course of tylenol # 3 with dental follow-up. Pt agrees. Anneliese Juan PA-C      MED RECONCILIATION:  Current Facility-Administered Medications   Medication Dose Route Frequency    cephALEXin (KEFLEX) capsule 500 mg  500 mg Oral NOW    acetaminophen-codeine (TYLENOL #3) per tablet 1 Tablet  1 Tablet Oral NOW     Current Outpatient Medications   Medication Sig    cephALEXin (Keflex) 500 mg capsule Take 1 Capsule by mouth three (3) times daily for 7 days.  acetaminophen-codeine (Tylenol-Codeine #3) 300-30 mg per tablet Take 1 Tablet by mouth every six (6) hours as needed for Pain for up to 5 days. Max Daily Amount: 4 Tablets.  furosemide (LASIX) 20 mg tablet Take 20 mg by mouth daily as needed.  DULoxetine (Cymbalta) 60 mg capsule Take 60 mg by mouth daily.  gabapentin (Neurontin) 100 mg capsule Take 100 mg by mouth three (3) times daily.  acetaminophen (TYLENOL) 325 mg tablet Take 2 Tabs by mouth every four (4) hours as needed for Pain.  LORazepam (ATIVAN) 1 mg tablet Take 0.5 Tabs by mouth every eight (8) hours as needed for Anxiety. Max Daily Amount: 1.5 mg.    ibuprofen (MOTRIN) 800 mg tablet Take 1 Tab by mouth every eight (8) hours as needed for Pain. Disposition:  D/c    DISCHARGE NOTE:   Patient is stable for discharge at this time. I have discussed all the findings from today's work up with the patient, including lab results and imaging. I have answered all questions. Rx for keflex and short course of tylenol # 3 given. Rest and close follow-up with the dentist recommended this week. Return to the ED immediately for any new or worsening symptoms.   Anneliese Lomax PA-C     Follow-up Information     Follow up With Specialties Details Why Contact Info    [address]  In 1 week  Ezequiel Brown [address] [facility] EMERGENCY DEPT Emergency Medicine  As needed, If symptoms worsen [address] [facility] [address]  [phone]          Current Discharge Medication List      START taking these medications    Details   cephALEXin (Keflex) 500 mg capsule Take 1 Capsule by mouth three (3) times daily for 7 days. Qty: 21 Capsule, Refills: 0  Start date: 3/21/2022, End date: 3/28/2022      acetaminophen-codeine (Tylenol-Codeine #3) 300-30 mg per tablet Take 1 Tablet by mouth every six (6) hours as needed for Pain for up to 5 days. Max Daily Amount: 4 Tablets. Qty: 8 Tablet, Refills: 0  Start date: 3/21/2022, End date: 3/26/2022    Associated Diagnoses: Pain, dental; Dental caries               Diagnosis     Clinical Impression:   1. Pain, dental    2.  Dental caries

## 2022-05-25 ENCOUNTER — HOSPITAL ENCOUNTER (EMERGENCY)
Age: 27
Discharge: HOME | End: 2022-05-26
Attending: STUDENT IN AN ORGANIZED HEALTH CARE EDUCATION/TRAINING PROGRAM
Payer: MEDICAID

## 2022-05-25 VITALS
WEIGHT: 293 LBS | HEIGHT: 64 IN | BODY MASS INDEX: 50.02 KG/M2 | TEMPERATURE: 99.3 F | RESPIRATION RATE: 18 BRPM | HEART RATE: 89 BPM | OXYGEN SATURATION: 97 % | DIASTOLIC BLOOD PRESSURE: 91 MMHG | SYSTOLIC BLOOD PRESSURE: 170 MMHG

## 2022-05-25 DIAGNOSIS — K08.89 DENTALGIA: Primary | ICD-10-CM

## 2022-05-25 DIAGNOSIS — K02.9 DENTAL CARIES: ICD-10-CM

## 2022-05-25 PROCEDURE — 99283 EMERGENCY DEPT VISIT LOW MDM: CPT

## 2022-05-25 RX ORDER — METFORMIN HYDROCHLORIDE 1000 MG/1
TABLET ORAL
COMMUNITY
Start: 2022-03-20

## 2022-05-26 PROCEDURE — 74011250637 HC RX REV CODE- 250/637: Performed by: PHYSICIAN ASSISTANT

## 2022-05-26 RX ORDER — AMOXICILLIN 500 MG/1
500 TABLET, FILM COATED ORAL 3 TIMES DAILY
Qty: 30 TABLET | Refills: 0 | Status: SHIPPED | OUTPATIENT
Start: 2022-05-26

## 2022-05-26 RX ORDER — ACETAMINOPHEN AND CODEINE PHOSPHATE 300; 30 MG/1; MG/1
1 TABLET ORAL
Status: COMPLETED | OUTPATIENT
Start: 2022-05-26 | End: 2022-05-26

## 2022-05-26 RX ORDER — ACETAMINOPHEN AND CODEINE PHOSPHATE 300; 30 MG/1; MG/1
1 TABLET ORAL
Qty: 10 TABLET | Refills: 0 | Status: SHIPPED | OUTPATIENT
Start: 2022-05-26 | End: 2022-05-31

## 2022-05-26 RX ORDER — AMOXICILLIN 250 MG/1
500 CAPSULE ORAL
Status: COMPLETED | OUTPATIENT
Start: 2022-05-26 | End: 2022-05-26

## 2022-05-26 RX ADMIN — AMOXICILLIN 500 MG: 250 CAPSULE ORAL at 01:16

## 2022-05-26 RX ADMIN — ACETAMINOPHEN AND CODEINE PHOSPHATE 1 TABLET: 300; 30 TABLET ORAL at 01:16

## 2022-05-26 NOTE — ED TRIAGE NOTES
Patient states she has a tooth hurting, it is causing left upper face to be swollen and has a headache

## 2022-05-26 NOTE — ED PROVIDER NOTES
EMERGENCY DEPARTMENT HISTORY AND PHYSICAL EXAM    Date: 5/25/2022  Patient Name: Marcelino Dukes    History of Presenting Illness     Chief Complaint   Patient presents with    Dental Pain         History Provided By: patient     Chief Complaint: dental pain  Duration: 5 days, worse in last 2 days  Timing: acute on chronic  Location: L upper molar  Quality: sharp shooting pain with ache  Severity: moderate to severe  Modifying Factors: motrin without relief, lying down exacerbates ache  Associated Symptoms: L sided headache      Additional History (Context): Marcelino Dukes is a 23 y.o. female with PMHx significant for asthma and diabetes who presents to the ED with several days of progressively worsening left upper dental pain, now with associated facial swelling and left sided headache. Pain is worse with hot and cold food/drink and with laying supine. Pt states she is unable to sleep due to the throbbing ache she gets when she tries to lay down. She has tried motrin with no relief. Denies fever or chills, difficulty chewing or swallowing. Pt was seen here in March for the same issue in the same region and was given keflex and tylenol #3; pt states her symptoms improved with this treatment. Current issue is not as severe as last time per patient. She has been unable to establish care with a dentist in the area due to insurance issues, notes she is on a waiting list for [facility]. Pt has not seen a dentist in 4 years. No other complaints at this time.      PCP: Hosea Liu MD    Current Facility-Administered Medications   Medication Dose Route Frequency Provider Last Rate Last Admin    acetaminophen-codeine (TYLENOL #3) per tablet 1 Tablet  1 Tablet Oral NOW Anneliese Juan PA-C        amoxicillin (AMOXIL) capsule 500 mg  500 mg Oral NOW Anneliese Juan PA-C         Current Outpatient Medications   Medication Sig Dispense Refill    amoxicillin 500 mg tab Take 500 mg by mouth three (3) times daily. 30 Tablet 0    acetaminophen-codeine (Tylenol-Codeine #3) 300-30 mg per tablet Take 1 Tablet by mouth every six (6) hours as needed for Pain for up to 5 days. Max Daily Amount: 4 Tablets. 10 Tablet 0    metFORMIN (GLUCOPHAGE) 1,000 mg tablet       furosemide (LASIX) 20 mg tablet Take 20 mg by mouth daily as needed.  DULoxetine (Cymbalta) 60 mg capsule Take 60 mg by mouth daily.  gabapentin (Neurontin) 100 mg capsule Take 100 mg by mouth three (3) times daily.  acetaminophen (TYLENOL) 325 mg tablet Take 2 Tabs by mouth every four (4) hours as needed for Pain. 20 Tab 0    ibuprofen (MOTRIN) 800 mg tablet Take 1 Tab by mouth every eight (8) hours as needed for Pain. 30 Tab 1       Past History     Past Medical History:  Past Medical History:   Diagnosis Date    Asthma     Diabetes ([facility] [address]        Past Surgical History:  Past Surgical History:   Procedure Laterality Date    HX APPENDECTOMY         Family History:  History reviewed. No pertinent family history. Social History:  Social History     Tobacco Use    Smoking status: Current Every Day Smoker    Smokeless tobacco: Never Used   Vaping Use    Vaping Use: Never used   Substance Use Topics    Alcohol use: Not Currently    Drug use: Not Currently       Allergies:  No Known Allergies      Review of Systems   Review of Systems   Constitutional: Negative for chills, fatigue and fever. HENT: Positive for dental problem. Negative for drooling, mouth sores and trouble swallowing. L upper molar    Respiratory: Negative for cough, choking and shortness of breath. Cardiovascular: Negative for chest pain and palpitations. Gastrointestinal: Negative for abdominal distention, abdominal pain, nausea and vomiting. Musculoskeletal: Negative for arthralgias, back pain, myalgias and neck pain. Skin: Negative for rash and wound. Neurological: Positive for headaches.  Negative for dizziness, speech difficulty, weakness and light-headedness. All other systems reviewed and are negative. All Other Systems Negative  Physical Exam     Vitals:    05/25/22 2341   BP: (!) 170/91   Pulse: 89   Resp: 18   Temp: 99.3 °F (37.4 °C)   SpO2: 97%   Weight: (!) 204.1 kg (450 lb)   Height: 5' 4\" (1.626 m)     Physical Exam  Vitals and nursing note reviewed. Constitutional:       General: She is in acute distress. Appearance: She is well-developed. She is obese. She is not diaphoretic. Comments: Appears mildly distressed    HENT:      Head: Normocephalic and atraumatic. Mouth/Throat:      Comments: Scattered dental caries throughout the oral cavity. TTP noted to the L upper molar region, no abscess palpated. Eyes:      General: No scleral icterus. Right eye: No discharge. Left eye: No discharge. Conjunctiva/sclera: Conjunctivae normal.   Cardiovascular:      Rate and Rhythm: Normal rate. Pulmonary:      Effort: Pulmonary effort is normal. No respiratory distress. Breath sounds: No stridor. Musculoskeletal:         General: Normal range of motion. Cervical back: Normal range of motion and neck supple. Skin:     General: Skin is warm and dry. Findings: No erythema or rash. Neurological:      General: No focal deficit present. Mental Status: She is alert and oriented to person, place, and time. Mental status is at baseline. Coordination: Coordination normal.      Comments: Gait is steady and patient exhibits no evidence of ataxia. Patient is able to ambulate without difficulty. No focal neurological deficit noted. No facial droop, slurred speech, or evidence of altered mentation noted on exam.     Psychiatric:         Behavior: Behavior normal.         Thought Content: Thought content normal.                Diagnostic Study Results     Labs -   No results found for this or any previous visit (from the past 12 hour(s)).     Radiologic Studies -   No orders to display     CT Results (Last 48 hours)    None        CXR Results  (Last 48 hours)    None            Medical Decision Making   I am the first provider for this patient. I reviewed the vital signs, available nursing notes, past medical history, past surgical history, family history and social history. Vital Signs-Reviewed the patient's vital signs. Records Reviewed: Anneliese Juan PA-C     Procedures:  Procedures    Provider Notes (Medical Decision Making): Impression:  Dental pain, dental caries    Pt made aware she will need to follow-up with a dentist, will treat with abx and short course of pain control. She denies any chance of pregnancy. Pt agrees with this plan. Anneliese Juan PA-C     MED RECONCILIATION:  Current Facility-Administered Medications   Medication Dose Route Frequency    acetaminophen-codeine (TYLENOL #3) per tablet 1 Tablet  1 Tablet Oral NOW    amoxicillin (AMOXIL) capsule 500 mg  500 mg Oral NOW     Current Outpatient Medications   Medication Sig    amoxicillin 500 mg tab Take 500 mg by mouth three (3) times daily.  acetaminophen-codeine (Tylenol-Codeine #3) 300-30 mg per tablet Take 1 Tablet by mouth every six (6) hours as needed for Pain for up to 5 days. Max Daily Amount: 4 Tablets.  metFORMIN (GLUCOPHAGE) 1,000 mg tablet     furosemide (LASIX) 20 mg tablet Take 20 mg by mouth daily as needed.  DULoxetine (Cymbalta) 60 mg capsule Take 60 mg by mouth daily.  gabapentin (Neurontin) 100 mg capsule Take 100 mg by mouth three (3) times daily.  acetaminophen (TYLENOL) 325 mg tablet Take 2 Tabs by mouth every four (4) hours as needed for Pain.  ibuprofen (MOTRIN) 800 mg tablet Take 1 Tab by mouth every eight (8) hours as needed for Pain. Disposition:  d/c    DISCHARGE NOTE:   Patient is stable for discharge at this time. I have discussed all the findings from today's work up with the patient, including lab results and imaging. I have answered all questions.  Rx for tylenol # 3 and amoxicillin given. Rest and close follow-up with the dentist recommended this week. Return to the ED immediately for any new or worsening symptoms. Anneliese Juan PA-C . Follow-up Information     Follow up With Specialties Details Why [id number] [address] [address]  [phone]          Current Discharge Medication List      START taking these medications    Details   amoxicillin 500 mg tab Take 500 mg by mouth three (3) times daily. Qty: 30 Tablet, Refills: 0  Start date: 5/26/2022      acetaminophen-codeine (Tylenol-Codeine #3) 300-30 mg per tablet Take 1 Tablet by mouth every six (6) hours as needed for Pain for up to 5 days. Max Daily Amount: 4 Tablets. Qty: 10 Tablet, Refills: 0  Start date: 5/26/2022, End date: 5/31/2022    Associated Diagnoses: Darwin Galindo; Dental caries             Diagnosis     Clinical Impression:   1. Dentalgia    2.  Dental caries

## 2022-05-26 NOTE — DISCHARGE INSTRUCTIONS
RentHop Activation    Thank you for requesting access to RentHop. Please follow the instructions below to securely access and download your online medical record. RentHop allows you to send messages to your doctor, view your test results, renew your prescriptions, schedule appointments, and more. How Do I Sign Up? In your internet browser, go to [email or web address]  Click on the First Time User? Click Here link in the Sign In box. You will be redirect to the New Member Sign Up page. Enter your RentHop Access Code exactly as it appears below. You will not need to use this code after youve completed the sign-up process. If you do not sign up before the expiration date, you must request a new code. RentHop Access Code: [email or web address]  Expires: 2022 11:38 PM (This is the date your RentHop access code will )    Enter the last four digits of your Social Security Number (xxxx) and Date of Birth (mm/dd/yyyy) as indicated and click Submit. You will be taken to the next sign-up page. Create a RentHop ID. This will be your RentHop login ID and cannot be changed, so think of one that is secure and easy to remember. Create a RentHop password. You can change your password at any time. Enter your Password Reset Question and Answer. This can be used at a later time if you forget your password. Enter your e-mail address. You will receive e-mail notification when new information is available in [address] Click Sign Up. You can now view and download portions of your medical record. Click the [place] link to download a portable copy of your medical information. Additional Information    If you have questions, please visit the Frequently Asked Questions section of the RentHop website at [email or web address] Remember, RentHop is NOT to be used for urgent needs. For medical emergencies, dial 911.

## 2022-07-10 ENCOUNTER — HOSPITAL ENCOUNTER (EMERGENCY)
Age: 27
Discharge: HOME | End: 2022-07-10
Attending: EMERGENCY MEDICINE
Payer: MEDICAID

## 2022-07-10 VITALS
OXYGEN SATURATION: 97 % | HEIGHT: 64 IN | BODY MASS INDEX: 50.02 KG/M2 | RESPIRATION RATE: 26 BRPM | WEIGHT: 293 LBS | SYSTOLIC BLOOD PRESSURE: 151 MMHG | TEMPERATURE: 98 F | HEART RATE: 74 BPM | DIASTOLIC BLOOD PRESSURE: 91 MMHG

## 2022-07-10 DIAGNOSIS — N20.0 NEPHROLITHIASIS: Primary | ICD-10-CM

## 2022-07-10 LAB
ALBUMIN SERPL-MCNC: 3.9 G/DL (ref 3.4–5)
ALBUMIN/GLOB SERPL: 1.3 {RATIO} (ref 0.8–1.7)
ALP SERPL-CCNC: 65 U/L (ref 45–117)
ALT SERPL-CCNC: 146 U/L (ref 13–56)
ANION GAP SERPL CALC-SCNC: 9 MMOL/L (ref 3–18)
APPEARANCE UR: CLEAR
AST SERPL-CCNC: 94 U/L (ref 10–38)
BACTERIA URNS QL MICRO: NEGATIVE /HPF
BASOPHILS # BLD: 0 K/UL (ref 0–0.1)
BASOPHILS NFR BLD: 0 % (ref 0–2)
BILIRUB SERPL-MCNC: 0.6 MG/DL (ref 0.2–1)
BILIRUB UR QL: NEGATIVE
BUN SERPL-MCNC: 11 MG/DL (ref 7–18)
BUN/CREAT SERPL: 13 (ref 12–20)
CALCIUM SERPL-MCNC: 9.3 MG/DL (ref 8.5–10.1)
CHLORIDE SERPL-SCNC: 101 MMOL/L (ref 100–111)
CO2 SERPL-SCNC: 29 MMOL/L (ref 21–32)
COLOR UR: YELLOW
CREAT SERPL-MCNC: 0.84 MG/DL (ref 0.6–1.3)
DIFFERENTIAL METHOD BLD: ABNORMAL
EOSINOPHIL # BLD: 0.3 K/UL (ref 0–0.4)
EOSINOPHIL NFR BLD: 3 % (ref 0–5)
EPITH CASTS URNS QL MICRO: NORMAL /LPF (ref 0–5)
ERYTHROCYTE [DISTWIDTH] IN BLOOD BY AUTOMATED COUNT: 13.1 % (ref 11.6–14.5)
GLOBULIN SER CALC-MCNC: 3.1 G/DL (ref 2–4)
GLUCOSE SERPL-MCNC: 266 MG/DL (ref 74–99)
GLUCOSE UR STRIP.AUTO-MCNC: >1000 MG/DL
HCG UR QL: NEGATIVE
HCT VFR BLD AUTO: 43.3 % (ref 35–45)
HGB BLD-MCNC: 14.2 G/DL (ref 12–16)
HGB UR QL STRIP: ABNORMAL
IMM GRANULOCYTES # BLD AUTO: 0.1 K/UL (ref 0–0.04)
IMM GRANULOCYTES NFR BLD AUTO: 1 % (ref 0–0.5)
KETONES UR QL STRIP.AUTO: NEGATIVE MG/DL
LEUKOCYTE ESTERASE UR QL STRIP.AUTO: NEGATIVE
LYMPHOCYTES # BLD: 2.6 K/UL (ref 0.9–3.6)
LYMPHOCYTES NFR BLD: 28 % (ref 21–52)
MCH RBC QN AUTO: 29.2 PG (ref 24–34)
MCHC RBC AUTO-ENTMCNC: 32.8 G/DL (ref 31–37)
MCV RBC AUTO: 89.1 FL (ref 78–100)
MONOCYTES # BLD: 0.6 K/UL (ref 0.05–1.2)
MONOCYTES NFR BLD: 6 % (ref 3–10)
NEUTS SEG # BLD: 5.9 K/UL (ref 1.8–8)
NEUTS SEG NFR BLD: 63 % (ref 40–73)
NITRITE UR QL STRIP.AUTO: NEGATIVE
NRBC # BLD: 0 K/UL (ref 0–0.01)
NRBC BLD-RTO: 0 PER 100 WBC
PH UR STRIP: 5.5 [PH] (ref 5–8)
PLATELET # BLD AUTO: 230 K/UL (ref 135–420)
PMV BLD AUTO: 9.4 FL (ref 9.2–11.8)
POTASSIUM SERPL-SCNC: 4.2 MMOL/L (ref 3.5–5.5)
PROT SERPL-MCNC: 7 G/DL (ref 6.4–8.2)
PROT UR STRIP-MCNC: NEGATIVE MG/DL
RBC # BLD AUTO: 4.86 M/UL (ref 4.2–5.3)
RBC #/AREA URNS HPF: NORMAL /HPF (ref 0–5)
SODIUM SERPL-SCNC: 139 MMOL/L (ref 136–145)
SP GR UR REFRACTOMETRY: >1.03 (ref 1–1.03)
UROBILINOGEN UR QL STRIP.AUTO: 1 EU/DL (ref 0.2–1)
WBC # BLD AUTO: 9.4 K/UL (ref 4.6–13.2)
WBC URNS QL MICRO: NORMAL /HPF (ref 0–4)

## 2022-07-10 PROCEDURE — 81001 URINALYSIS AUTO W/SCOPE: CPT

## 2022-07-10 PROCEDURE — 96374 THER/PROPH/DIAG INJ IV PUSH: CPT

## 2022-07-10 PROCEDURE — 96375 TX/PRO/DX INJ NEW DRUG ADDON: CPT

## 2022-07-10 PROCEDURE — 99284 EMERGENCY DEPT VISIT MOD MDM: CPT

## 2022-07-10 PROCEDURE — 80053 COMPREHEN METABOLIC PANEL: CPT

## 2022-07-10 PROCEDURE — 81025 URINE PREGNANCY TEST: CPT

## 2022-07-10 PROCEDURE — 85025 COMPLETE CBC W/AUTO DIFF WBC: CPT

## 2022-07-10 PROCEDURE — 74011250637 HC RX REV CODE- 250/637: Performed by: STUDENT IN AN ORGANIZED HEALTH CARE EDUCATION/TRAINING PROGRAM

## 2022-07-10 PROCEDURE — 96361 HYDRATE IV INFUSION ADD-ON: CPT

## 2022-07-10 PROCEDURE — 74011250636 HC RX REV CODE- 250/636: Performed by: STUDENT IN AN ORGANIZED HEALTH CARE EDUCATION/TRAINING PROGRAM

## 2022-07-10 RX ORDER — OXYCODONE HYDROCHLORIDE 5 MG/1
5 TABLET ORAL
Qty: 12 TABLET | Refills: 0 | Status: SHIPPED | OUTPATIENT
Start: 2022-07-10 | End: 2022-07-13

## 2022-07-10 RX ORDER — ONDANSETRON 2 MG/ML
4 INJECTION INTRAMUSCULAR; INTRAVENOUS ONCE
Status: COMPLETED | OUTPATIENT
Start: 2022-07-10 | End: 2022-07-10

## 2022-07-10 RX ORDER — ACETAMINOPHEN 500 MG
1000 TABLET ORAL ONCE
Status: COMPLETED | OUTPATIENT
Start: 2022-07-10 | End: 2022-07-10

## 2022-07-10 RX ORDER — ONDANSETRON 8 MG/1
8 TABLET, ORALLY DISINTEGRATING ORAL
Qty: 12 TABLET | Refills: 0 | Status: SHIPPED
Start: 2022-07-10

## 2022-07-10 RX ORDER — TAMSULOSIN HYDROCHLORIDE 0.4 MG/1
0.4 CAPSULE ORAL DAILY
Qty: 15 CAPSULE | Refills: 0 | Status: SHIPPED
Start: 2022-07-10 | End: 2022-07-25

## 2022-07-10 RX ORDER — KETOROLAC TROMETHAMINE 15 MG/ML
15 INJECTION, SOLUTION INTRAMUSCULAR; INTRAVENOUS ONCE
Status: COMPLETED | OUTPATIENT
Start: 2022-07-10 | End: 2022-07-10

## 2022-07-10 RX ADMIN — KETOROLAC TROMETHAMINE 15 MG: 15 INJECTION, SOLUTION INTRAMUSCULAR; INTRAVENOUS at 17:09

## 2022-07-10 RX ADMIN — SODIUM CHLORIDE 1000 ML: 9 INJECTION, SOLUTION INTRAVENOUS at 16:23

## 2022-07-10 RX ADMIN — ACETAMINOPHEN 1000 MG: 500 TABLET ORAL at 16:15

## 2022-07-10 RX ADMIN — ONDANSETRON 4 MG: 2 INJECTION INTRAMUSCULAR; INTRAVENOUS at 16:14

## 2022-07-10 NOTE — ED TRIAGE NOTES
Left flank pain that started 3 days. Patient states that she nauseous and was vomiting earlier today.

## 2022-07-10 NOTE — ED PROVIDER NOTES
67-year-old female with past medical history of type 2 diabetes on metformin, also a history of kidney stones presents with a chief complaint of 3 days left-sided flank pain and nonbloody nausea and vomiting that started yesterday. Patient endorsed this is similar to previous kidney stone she has had in the past.  Patient denied any other associated symptoms or complaints at this time. Past Medical History:   Diagnosis Date    Asthma     Diabetes [facility])        Past Surgical History:   Procedure Laterality Date    HX APPENDECTOMY           No family history on file. Social History     Socioeconomic History    Marital status:      Spouse name: Not on file    Number of children: Not on file    Years of education: Not on file    Highest education level: Not on file   Occupational History    Not on file   Tobacco Use    Smoking status: Current Every Day Smoker    Smokeless tobacco: Never Used   Vaping Use    Vaping Use: Never used   Substance and Sexual Activity    Alcohol use: Not Currently    Drug use: Not Currently    Sexual activity: Not on file   Other Topics Concern    Not on file   Social History Narrative    Not on file     Social Determinants of Health     Financial Resource Strain:     Difficulty of Paying Living Expenses: Not on file   Food Insecurity:     Worried About Running Out of Food in the Last Year: Not on file    Tamar of Food in the Last Year: Not on file   Transportation Needs:     Lack of Transportation (Medical): Not on file    Lack of Transportation (Non-Medical):  Not on file   Physical Activity:     Days of Exercise per Week: Not on file    Minutes of Exercise per Session: Not on file   Stress:     Feeling of Stress : Not on file   Social Connections:     Frequency of Communication with Friends and Family: Not on file    Frequency of Social Gatherings with Friends and Family: Not on file    Attends Orthodoxy Services: Not on file    Active Member of Clubs or Organizations: Not on file    Attends Club or Organization Meetings: Not on file    Marital Status: Not on file   Intimate Partner Violence:     Fear of Current or Ex-Partner: Not on file    Emotionally Abused: Not on file    Physically Abused: Not on file    Sexually Abused: Not on file   Housing Stability:     Unable to Pay for Housing in the Last Year: Not on file    Number of [place] in the Last Year: Not on file    Unstable Housing in the Last Year: Not on file         ALLERGIES: Patient has no known allergies. Review of Systems   Constitutional: Negative for chills, diaphoresis and fever. HENT: Negative for congestion and sore throat. Eyes: Negative for visual disturbance. Respiratory: Negative for cough and shortness of breath. Cardiovascular: Negative for chest pain. Gastrointestinal: Positive for nausea and vomiting. Negative for abdominal pain, blood in stool and diarrhea. Genitourinary: Positive for flank pain. Negative for difficulty urinating, dysuria, frequency and urgency. Musculoskeletal: Negative for arthralgias. Skin: Negative for rash. Allergic/Immunologic: Negative for immunocompromised state. Neurological: Negative for dizziness, syncope, light-headedness and headaches. Hematological: Negative for adenopathy. Psychiatric/Behavioral: Negative for confusion. Vitals:    07/10/22 1501   BP: (!) 151/91   Pulse: 74   Resp: 26   Temp: 98 °F (36.7 °C)   SpO2: 97%   Weight: (!) 201.9 kg (445 lb)   Height: 5' 4\" (1.626 m)            Physical Exam  Constitutional:       Appearance: Normal appearance. She is obese. HENT:      Head: Normocephalic and atraumatic. Nose: Nose normal.      Mouth/Throat:      Mouth: Mucous membranes are moist.      Pharynx: Oropharynx is clear. Eyes:      Extraocular Movements: Extraocular movements intact. Pupils: Pupils are equal, round, and reactive to light.    Cardiovascular:      Rate and Rhythm: Normal rate and regular rhythm. Pulses: Normal pulses. Heart sounds: Normal heart sounds. Pulmonary:      Effort: Pulmonary effort is normal. No respiratory distress. Breath sounds: Normal breath sounds. Abdominal:      Palpations: Abdomen is soft. Tenderness: There is no abdominal tenderness. There is left CVA tenderness. Musculoskeletal:         General: No swelling or tenderness. Normal range of motion. Cervical back: Neck supple. Skin:     General: Skin is warm and dry. Capillary Refill: Capillary refill takes less than 2 seconds. Findings: No rash. Neurological:      General: No focal deficit present. Mental Status: She is alert and oriented to person, place, and time. Psychiatric:         Mood and Affect: Mood normal.          MDM  Vital signs notable for blood pressure 151/91, otherwise within normal limits. Exam remarkable for left-sided CVA tenderness, also remarkable for morbidly obese patient, otherwise unremarkable. Ordered UA/hCG, CBC, CMP  Also ordered 1 L IV fluids, IV Toradol, p.o. Tylenol, IV Zofran.    6:11 PM  Labs notable for ALT slightly elevated 146, AST 94.  Appears improved from 5 months ago, also notable for UA with blood and glucose.  1000. Glucose in . Labs were otherwise unremarkable. On reassessment patient endorsed improvement in her symptoms. I discussed at length with her since her symptoms were very similar to previous kidney stones and she has no signs of infection she is most likely safe to go home at this time. I will prescribe her short course of Flomax, oxycodone, p.o. Zofran for home also discussed symptomatic treatment with ibuprofen and Tylenol. Discussed this all at length with patient and she was discharged home with instructions to follow-up with her primary care provider good return precautions for worsening or worrisome symptoms. She expressed understanding and was on board with fidel Quiroz MISHEL Ramhan. [address]

## 2022-07-10 NOTE — ED NOTES
7/10/2022   Midlevel attestation placed in error. See addendum to ED note below. I personally saw and examined the patient. I have reviewed and agree with the residents findings, including all diagnostic interpretations, and plans as written. I was present during the key portions of separately billed procedures.   Laurie Rolle MD

## 2022-07-31 ENCOUNTER — HOSPITAL ENCOUNTER (EMERGENCY)
Age: 27
Discharge: HOME | End: 2022-07-31
Attending: EMERGENCY MEDICINE
Payer: MEDICAID

## 2022-07-31 VITALS
BODY MASS INDEX: 78.1 KG/M2 | OXYGEN SATURATION: 98 % | SYSTOLIC BLOOD PRESSURE: 167 MMHG | WEIGHT: 293 LBS | TEMPERATURE: 97.8 F | HEART RATE: 91 BPM | RESPIRATION RATE: 16 BRPM | DIASTOLIC BLOOD PRESSURE: 89 MMHG

## 2022-07-31 DIAGNOSIS — E11.65 UNCONTROLLED TYPE 2 DIABETES MELLITUS WITH HYPERGLYCEMIA (HCC): Primary | ICD-10-CM

## 2022-07-31 LAB
ALBUMIN SERPL-MCNC: 4.1 G/DL (ref 3.4–5)
ALBUMIN/GLOB SERPL: 1.4 {RATIO} (ref 0.8–1.7)
ALP SERPL-CCNC: 70 U/L (ref 45–117)
ALT SERPL-CCNC: 192 U/L (ref 13–56)
ANION GAP SERPL CALC-SCNC: 10 MMOL/L (ref 3–18)
AST SERPL-CCNC: 136 U/L (ref 10–38)
ATRIAL RATE: 87 BPM
BASOPHILS # BLD: 0 K/UL (ref 0–0.1)
BASOPHILS NFR BLD: 0 % (ref 0–2)
BILIRUB SERPL-MCNC: 0.7 MG/DL (ref 0.2–1)
BNP SERPL-MCNC: 166 PG/ML (ref 0–450)
BUN SERPL-MCNC: 11 MG/DL (ref 7–18)
BUN/CREAT SERPL: 11 (ref 12–20)
CALCIUM SERPL-MCNC: 9.8 MG/DL (ref 8.5–10.1)
CALCULATED P AXIS, ECG09: 61 DEGREES
CALCULATED R AXIS, ECG10: 63 DEGREES
CALCULATED T AXIS, ECG11: 26 DEGREES
CHLORIDE SERPL-SCNC: 99 MMOL/L (ref 100–111)
CO2 SERPL-SCNC: 31 MMOL/L (ref 21–32)
CREAT SERPL-MCNC: 1 MG/DL (ref 0.6–1.3)
DIAGNOSIS, 93000: NORMAL
DIFFERENTIAL METHOD BLD: NORMAL
EOSINOPHIL # BLD: 0.2 K/UL (ref 0–0.4)
EOSINOPHIL NFR BLD: 2 % (ref 0–5)
ERYTHROCYTE [DISTWIDTH] IN BLOOD BY AUTOMATED COUNT: 13.1 % (ref 11.6–14.5)
GLOBULIN SER CALC-MCNC: 2.9 G/DL (ref 2–4)
GLUCOSE BLD STRIP.AUTO-MCNC: 364 MG/DL (ref 70–110)
GLUCOSE SERPL-MCNC: 354 MG/DL (ref 74–99)
HCG SERPL QL: NEGATIVE
HCT VFR BLD AUTO: 38.5 % (ref 35–45)
HGB BLD-MCNC: 12.6 G/DL (ref 12–16)
IMM GRANULOCYTES # BLD AUTO: 0 K/UL (ref 0–0.04)
IMM GRANULOCYTES NFR BLD AUTO: 0 % (ref 0–0.5)
LYMPHOCYTES # BLD: 3.1 K/UL (ref 0.9–3.6)
LYMPHOCYTES NFR BLD: 35 % (ref 21–52)
MAGNESIUM SERPL-MCNC: 1.6 MG/DL (ref 1.6–2.6)
MCH RBC QN AUTO: 29.6 PG (ref 24–34)
MCHC RBC AUTO-ENTMCNC: 32.7 G/DL (ref 31–37)
MCV RBC AUTO: 90.4 FL (ref 78–100)
MONOCYTES # BLD: 0.6 K/UL (ref 0.05–1.2)
MONOCYTES NFR BLD: 7 % (ref 3–10)
NEUTS SEG # BLD: 4.9 K/UL (ref 1.8–8)
NEUTS SEG NFR BLD: 56 % (ref 40–73)
NRBC # BLD: 0 K/UL (ref 0–0.01)
NRBC BLD-RTO: 0 PER 100 WBC
P-R INTERVAL, ECG05: 156 MS
PH BLDV: 7.36 [PH] (ref 7.32–7.42)
PLATELET # BLD AUTO: 212 K/UL (ref 135–420)
PMV BLD AUTO: 9.8 FL (ref 9.2–11.8)
POTASSIUM SERPL-SCNC: 3.8 MMOL/L (ref 3.5–5.5)
PROT SERPL-MCNC: 7 G/DL (ref 6.4–8.2)
Q-T INTERVAL, ECG07: 380 MS
QRS DURATION, ECG06: 108 MS
QTC CALCULATION (BEZET), ECG08: 457 MS
RBC # BLD AUTO: 4.26 M/UL (ref 4.2–5.3)
SODIUM SERPL-SCNC: 140 MMOL/L (ref 136–145)
TROPONIN-HIGH SENSITIVITY: 9 NG/L (ref 0–54)
VENTRICULAR RATE, ECG03: 87 BPM
WBC # BLD AUTO: 8.7 K/UL (ref 4.6–13.2)

## 2022-07-31 PROCEDURE — 82800 BLOOD PH: CPT

## 2022-07-31 PROCEDURE — 84484 ASSAY OF TROPONIN QUANT: CPT

## 2022-07-31 PROCEDURE — 85025 COMPLETE CBC W/AUTO DIFF WBC: CPT

## 2022-07-31 PROCEDURE — 80053 COMPREHEN METABOLIC PANEL: CPT

## 2022-07-31 PROCEDURE — 99284 EMERGENCY DEPT VISIT MOD MDM: CPT

## 2022-07-31 PROCEDURE — 82962 GLUCOSE BLOOD TEST: CPT

## 2022-07-31 PROCEDURE — 74011250636 HC RX REV CODE- 250/636: Performed by: EMERGENCY MEDICINE

## 2022-07-31 PROCEDURE — 84703 CHORIONIC GONADOTROPIN ASSAY: CPT

## 2022-07-31 PROCEDURE — 83880 ASSAY OF NATRIURETIC PEPTIDE: CPT

## 2022-07-31 PROCEDURE — 83735 ASSAY OF MAGNESIUM: CPT

## 2022-07-31 PROCEDURE — 93005 ELECTROCARDIOGRAM TRACING: CPT

## 2022-07-31 RX ADMIN — SODIUM CHLORIDE 1000 ML: 9 INJECTION, SOLUTION INTRAVENOUS at 02:30

## 2022-07-31 NOTE — ED PROVIDER NOTES
EMERGENCY DEPARTMENT HISTORY AND PHYSICAL EXAM    1:28 AM patient seen at this time in room 7      Date: 7/31/2022  Patient Name: Sandhya Alex    History of Presenting Illness     Chief Complaint   Patient presents with    High Blood Sugar         History Provided By: patient    Additional History (Context): Sandhya Alex is a 23 y.o. female presents with history of diabetes diagnosed about 7 months ago, on metformin. Sugars have been high 2 50-4 80 and not feeling very good. Polyuria polydipsia no pain or fever. [facility] PCP: Fabiano Mckeon MD    Chief Complaint:   Duration:    Timing:    Location:   Quality:   Severity:   Modifying Factors:   Associated Symptoms:       Current Outpatient Medications   Medication Sig Dispense Refill    ondansetron (ZOFRAN ODT) 8 mg disintegrating tablet Take 1 Tablet by mouth every eight (8) hours as needed for Nausea for up to 12 doses. 12 Tablet 0    amoxicillin 500 mg tab Take 500 mg by mouth three (3) times daily. 30 Tablet 0    metFORMIN (GLUCOPHAGE) 1,000 mg tablet       furosemide (LASIX) 20 mg tablet Take 20 mg by mouth daily as needed. DULoxetine (Cymbalta) 60 mg capsule Take 60 mg by mouth daily. gabapentin (Neurontin) 100 mg capsule Take 100 mg by mouth three (3) times daily. acetaminophen (TYLENOL) 325 mg tablet Take 2 Tabs by mouth every four (4) hours as needed for Pain. 20 Tab 0    ibuprofen (MOTRIN) 800 mg tablet Take 1 Tab by mouth every eight (8) hours as needed for Pain. 30 Tab 1       Past History     Past Medical History:  Past Medical History:   Diagnosis Date    Asthma     Diabetes ([address]        Past Surgical History:  Past Surgical History:   Procedure Laterality Date    HX APPENDECTOMY         Family History:  History reviewed. No pertinent family history.     Social History:  Social History     Tobacco Use    Smoking status: Every Day    Smokeless tobacco: Never   Vaping Use    Vaping Use: Never used   Substance Use Topics    Alcohol use: Not Currently    Drug use: Not Currently       Allergies:  No Known Allergies      Review of Systems     Review of Systems   Constitutional:  Negative for diaphoresis and fever. HENT:  Negative for congestion and sore throat. Eyes:  Negative for pain and itching. Respiratory:  Negative for cough and shortness of breath. Cardiovascular:  Negative for chest pain and palpitations. Gastrointestinal:  Negative for abdominal pain and diarrhea. Endocrine: Positive for polydipsia and polyuria. Genitourinary:  Negative for dysuria and hematuria. Musculoskeletal:  Negative for arthralgias and myalgias. Skin:  Negative for rash and wound. Neurological:  Positive for light-headedness. Negative for seizures and syncope. Hematological:  Does not bruise/bleed easily. Psychiatric/Behavioral:  Negative for agitation and hallucinations. Physical Exam       Patient Vitals for the past 12 hrs:   Temp Pulse Resp BP SpO2   07/31/22 0440 97.8 °F (36.6 °C) -- -- -- --   07/31/22 0123 97.8 °F (36.6 °C) 91 16 (!) 167/89 98 %       IPVITALS  Patient Vitals for the past 24 hrs:   BP Temp Pulse Resp SpO2 Weight   07/31/22 0440 -- 97.8 °F (36.6 °C) -- -- -- --   07/31/22 0123 (!) 167/89 97.8 °F (36.6 °C) 91 16 98 % (!) 206.4 kg (455 lb)       Physical Exam  Vitals and nursing note reviewed. Constitutional:       General: She is in acute distress (Mild discomfort). Appearance: She is well-developed. She is obese. She is not toxic-appearing. HENT:      Head: Normocephalic and atraumatic. Eyes:      General: No scleral icterus. Conjunctiva/sclera: Conjunctivae normal.   Neck:      Vascular: No JVD. Cardiovascular:      Rate and Rhythm: Regular rhythm. Tachycardia present. Heart sounds: Normal heart sounds. Comments: 4 intact extremity pulses  Pulmonary:      Effort: Pulmonary effort is normal.      Breath sounds: Normal breath sounds. Abdominal:      Palpations: Abdomen is soft. There is no mass. Tenderness: There is no abdominal tenderness. Musculoskeletal:         General: Normal range of motion. Cervical back: Normal range of motion and neck supple. Lymphadenopathy:      Cervical: No cervical adenopathy. Skin:     General: Skin is warm and dry. Neurological:      Mental Status: She is alert. Diagnostic Study Results   Labs -  Recent Results (from the past 24 hour(s))   GLUCOSE, POC    Collection Time: 07/31/22  1:22 AM   Result Value Ref Range    Glucose (POC) 364 (H) 70 - 973 mg/dL   METABOLIC PANEL, COMPREHENSIVE    Collection Time: 07/31/22  1:40 AM   Result Value Ref Range    Sodium 140 136 - 145 mmol/L    Potassium 3.8 3.5 - 5.5 mmol/L    Chloride 99 (L) 100 - 111 mmol/L    CO2 31 21 - 32 mmol/L    Anion gap 10 3.0 - 18 mmol/L    Glucose 354 (H) 74 - 99 mg/dL    BUN 11 7.0 - 18 MG/DL    Creatinine 1.00 0.6 - 1.3 MG/DL    BUN/Creatinine ratio 11 (L) 12 - 20      GFR est AA >60 >60 ml/min/1.73m2    GFR est non-AA >60 >60 ml/min/1.73m2    Calcium 9.8 8.5 - 10.1 MG/DL    Bilirubin, total 0.7 0.2 - 1.0 MG/DL    ALT (SGPT) 192 (H) 13 - 56 U/L    AST (SGOT) 136 (H) 10 - 38 U/L    Alk.  phosphatase 70 45 - 117 U/L    Protein, total 7.0 6.4 - 8.2 g/dL    Albumin 4.1 3.4 - 5.0 g/dL    Globulin 2.9 2.0 - 4.0 g/dL    A-G Ratio 1.4 0.8 - 1.7     MAGNESIUM    Collection Time: 07/31/22  1:40 AM   Result Value Ref Range    Magnesium 1.6 1.6 - 2.6 mg/dL   HCG QL SERUM    Collection Time: 07/31/22  1:40 AM   Result Value Ref Range    HCG, Ql. Negative NEG     NT-PRO BNP    Collection Time: 07/31/22  1:40 AM   Result Value Ref Range    NT pro- 0 - 450 PG/ML   CBC WITH AUTOMATED DIFF    Collection Time: 07/31/22  5:20 AM   Result Value Ref Range    WBC 8.7 4.6 - 13.2 K/uL    RBC 4.26 4.20 - 5.30 M/uL    HGB 12.6 12.0 - 16.0 g/dL    HCT 38.5 35.0 - 45.0 %    MCV 90.4 78.0 - 100.0 FL    MCH 29.6 24.0 - 34.0 PG    MCHC 32.7 31.0 - 37.0 g/dL    RDW 13.1 11.6 - 14.5 % PLATELET 118 894 - 748 K/uL    MPV 9.8 9.2 - 11.8 FL    NRBC 0.0 0  WBC    ABSOLUTE NRBC 0.00 0.00 - 0.01 K/uL    NEUTROPHILS 56 40 - 73 %    LYMPHOCYTES 35 21 - 52 %    MONOCYTES 7 3 - 10 %    EOSINOPHILS 2 0 - 5 %    BASOPHILS 0 0 - 2 %    IMMATURE GRANULOCYTES 0 0.0 - 0.5 %    ABS. NEUTROPHILS 4.9 1.8 - 8.0 K/UL    ABS. LYMPHOCYTES 3.1 0.9 - 3.6 K/UL    ABS. MONOCYTES 0.6 0.05 - 1.2 K/UL    ABS. EOSINOPHILS 0.2 0.0 - 0.4 K/UL    ABS. BASOPHILS 0.0 0.0 - 0.1 K/UL    ABS. IMM. GRANS. 0.0 0.00 - 0.04 K/UL    DF AUTOMATED     PH, VENOUS    Collection Time: 07/31/22  5:20 AM   Result Value Ref Range    VENOUS PH 7.36 7.32 - 7.42         Radiologic Studies -   No orders to display     No results found. Medications ordered:   Medications   sodium chloride 0.9 % bolus infusion 1,000 mL (1,000 mL IntraVENous New Bag 7/31/22 0230)         Medical Decision Making   Initial Medical Decision Making and DDx:  Evaluate forDKA hyperosmolar state dehydration LORAINE uncontrolled diabetes    ED Course: Progress Notes, Reevaluation, and Consults:  ED Course as of 07/31/22 0553   Sun Jul 31, 2022   0154 Twelve-lead EKG sinus rhythm at 87 no acute process [CB]      ED Course User Index  [CB] Henry Gardner MD     5:54 AM Pt reevaluated at this time. Discussed results and findings, as well as, diagnosis and plan for discharge. Follow up with doctors/services listed. Return to the emergency department for alarming symptoms. Pt verbalizes understanding and agreement with plan. All questions addressed. No alarming findings on diagnostics, uncontrolled diabetes with hyperglycemia discussed with the patient she will follow-up with primary care. I am the first provider for this patient. I reviewed the vital signs, available nursing notes, past medical history, past surgical history, family history and social history.     Patient Vitals for the past 12 hrs:   Temp Pulse Resp BP SpO2   07/31/22 0440 97.8 °F (36.6 °C) -- -- -- --   07/31/22 0123 97.8 °F (36.6 °C) 91 16 (!) 167/89 98 %       Vital Signs-Reviewed the patient's vital signs. Pulse Oximetry Analysis, Cardiac Monitor, 12 lead ekg:      Interpreted by the EP. Records Reviewed: Nursing notes reviewed (Time of Review: 1:28 AM)    Procedures:   Critical Care Time:   Aspirin: (was aspirin given for stroke?)    Diagnosis     Clinical Impression:   1. Uncontrolled type 2 diabetes mellitus with hyperglycemia ([facility] [address]        Disposition: Discharged      Follow-up Information       Follow up With Specialties Details Why Contact Info    Sapphire Rapp MD Internal Medicine Physician In 2 days  [place] [id number] [phone]               Patient's Medications   Start Taking    No medications on file   Continue Taking    ACETAMINOPHEN (TYLENOL) 325 MG TABLET    Take 2 Tabs by mouth every four (4) hours as needed for Pain. AMOXICILLIN 500 MG TAB    Take 500 mg by mouth three (3) times daily. DULOXETINE (CYMBALTA) 60 MG CAPSULE    Take 60 mg by mouth daily. FUROSEMIDE (LASIX) 20 MG TABLET    Take 20 mg by mouth daily as needed. GABAPENTIN (NEURONTIN) 100 MG CAPSULE    Take 100 mg by mouth three (3) times daily. IBUPROFEN (MOTRIN) 800 MG TABLET    Take 1 Tab by mouth every eight (8) hours as needed for Pain. METFORMIN (GLUCOPHAGE) 1,000 MG TABLET        ONDANSETRON (ZOFRAN ODT) 8 MG DISINTEGRATING TABLET    Take 1 Tablet by mouth every eight (8) hours as needed for Nausea for up to 12 doses.    These Medications have changed    No medications on file   Stop Taking    No medications on file     _______________________________    Notes:    Christal Rees MD using Dragon dictation      _______________________________

## 2022-07-31 NOTE — ED TRIAGE NOTES
Pt c/o blood glucose readings in the 400s over the past week, state she manages her glucose with metformin only, does not take insulin.

## 2022-08-25 ENCOUNTER — TRANSCRIBE ORDER (OUTPATIENT)
Dept: SCHEDULING | Age: 27
End: 2022-08-25

## 2022-08-25 DIAGNOSIS — M79.662 PAIN OF LEFT LOWER LEG: Primary | ICD-10-CM

## 2022-10-13 ENCOUNTER — HOSPITAL ENCOUNTER (EMERGENCY)
Age: 27
Discharge: HOME | End: 2022-10-13
Attending: EMERGENCY MEDICINE
Payer: MEDICAID

## 2022-10-13 VITALS
DIASTOLIC BLOOD PRESSURE: 86 MMHG | TEMPERATURE: 99 F | SYSTOLIC BLOOD PRESSURE: 158 MMHG | BODY MASS INDEX: 76.38 KG/M2 | RESPIRATION RATE: 16 BRPM | WEIGHT: 293 LBS | HEART RATE: 111 BPM | OXYGEN SATURATION: 96 %

## 2022-10-13 DIAGNOSIS — L02.91 ABSCESS: Primary | ICD-10-CM

## 2022-10-13 DIAGNOSIS — L03.311 CELLULITIS OF ABDOMINAL WALL: ICD-10-CM

## 2022-10-13 PROCEDURE — 99283 EMERGENCY DEPT VISIT LOW MDM: CPT

## 2022-10-13 PROCEDURE — 75810000289 HC I&D ABSCESS SIMP/COMP/MULT

## 2022-10-13 RX ORDER — CLINDAMYCIN HYDROCHLORIDE 150 MG/1
450 CAPSULE ORAL 3 TIMES DAILY
Qty: 63 CAPSULE | Refills: 0 | Status: SHIPPED | OUTPATIENT
Start: 2022-10-13 | End: 2022-10-20

## 2022-10-14 NOTE — ED TRIAGE NOTES
Pt c/o possible abscess under right breast x 3 days.   Pt states she had the same thing recently under her left breast.

## 2022-10-14 NOTE — ED PROVIDER NOTES
This patient was evaluated in the ED, although initial hx and physical exam information was obtained by Jayson Onofre, who also dictated a record of this visit. I independently examined and evaluated this patient and made all diagnostic, treatment and disposition decisions. 78-year-old female, history of diabetes, presenting with abscess. Patient reports that today she started noticing some swelling under the right breast.  However did not start bothering her in today until it almost seemed like it was about to burst.  Has had previous abscesses in the past.  Denies any fevers at home. On exam: 3 cm area of fluctuance under the right breast, does have a 4.5 cm area of erythema and warmth surrounding it. I&D was done. Please refer to resident note for further details. Patient will be discharged on clindamycin for surrounding cellulitis. Clinical Impression: Abscess and cellulitis    Please note this report has been produced using speech recognition software and may contain errors related to that system including errors in grammar, punctuation, and spelling, as well as words and phrases that may be inappropriate. If there are questions or concerns please feel free to contact the dictating provider for clarification.

## 2022-10-14 NOTE — ED PROVIDER NOTES
21-year-old female with a history of type 2 diabetes, asthma, and obesity presents with 2 days of increasing pain and redness under her right breast.  Patient states she has had a few prior skin infections for which contain MRSA. Denies any fevers. Skin Problem       Past Medical History:   Diagnosis Date    Asthma     Diabetes ([facility] [address]        Past Surgical History:   Procedure Laterality Date    HX APPENDECTOMY           No family history on file. Social History     Socioeconomic History    Marital status:      Spouse name: Not on file    Number of children: Not on file    Years of education: Not on file    Highest education level: Not on file   Occupational History    Not on file   Tobacco Use    Smoking status: Every Day    Smokeless tobacco: Never   Vaping Use    Vaping Use: Never used   Substance and Sexual Activity    Alcohol use: Not Currently    Drug use: Not Currently    Sexual activity: Not on file   Other Topics Concern    Not on file   Social History Narrative    Not on file     Social Determinants of Health     Financial Resource Strain: Not on file   Food Insecurity: Not on file   Transportation Needs: Not on file   Physical Activity: Not on file   Stress: Not on file   Social Connections: Not on file   Intimate Partner Violence: Not on file   Housing Stability: Not on file         ALLERGIES: Patient has no known allergies. Review of Systems   Constitutional:  Negative for fever. HENT:  Positive for congestion. Eyes: Negative. Respiratory: Negative. Cardiovascular: Negative. Gastrointestinal: Negative. Endocrine: Negative. Skin:  Positive for rash. Neurological: Negative. Psychiatric/Behavioral: Negative. Vitals:    10/13/22 2008   BP: (!) 158/86   Pulse: (!) 111   Resp: 16   Temp: 99 °F (37.2 °C)   SpO2: 96%   Weight: (!) 201.9 kg (445 lb)            Physical Exam  Constitutional:       Appearance: She is obese.    HENT:      Head: Normocephalic and atraumatic. Eyes:      Extraocular Movements: Extraocular movements intact. Cardiovascular:      Rate and Rhythm: Regular rhythm. Tachycardia present. Pulmonary:      Effort: Pulmonary effort is normal.   Abdominal:      Palpations: Abdomen is soft. Musculoskeletal:         General: Normal range of motion. Cervical back: Normal range of motion. Skin:     General: Skin is warm and dry. Findings: Rash present. Comments: 2 x 2 centimeter abscess under her right breast with 4 cm of surrounding cellulitis. On ultrasound diameter measures approximately 2x1.5cm. Neurological:      General: No focal deficit present. Mental Status: She is alert and oriented to person, place, and time. Psychiatric:         Mood and Affect: Mood normal.         Behavior: Behavior normal.        MDM  25-year-old female presenting with an abscess of 2 days duration. No signs of systemic infection. Ultrasound demonstrates single drainable abscess. Abscess drained with good return of purulent fluid. Will place on antibiotics due to surrounding cellulitis. Given return precautions, follow-up instructions, and wound care supplies and discharged.        I&D Abcess Simple    Date/Time: 10/13/2022 9:22 PM  Performed by: Lana Field MD  Authorized by: Lana Field MD     Consent:     Consent obtained:  Verbal    Consent given by:  Patient    Risks, benefits, and alternatives were discussed: yes      Risks discussed:  Bleeding, incomplete drainage, infection and pain  Universal protocol:     Procedure explained and questions answered to patient or proxy's satisfaction: yes      Immediately prior to procedure, a time out was called: yes      Patient identity confirmed:  Verbally with patient  Location:     Type:  Abscess    Size:  2x1.5cm    Location:  Trunk    Trunk location:  R breast  Pre-procedure details:     Skin preparation:  Chlorhexidine with alcohol  Sedation:     Sedation type: None  Anesthesia:     Anesthesia method:  Local infiltration    Local anesthetic:  Lidocaine 1% w/o epi  Procedure type:     Complexity:  Simple  Procedure details:     Ultrasound guidance: yes      Incision types:  Stab incision and single straight    Wound management:  Probed and deloculated, irrigated with saline and extensive cleaning    Drainage:  Purulent and bloody    Drainage amount:   Moderate    Wound treatment:  Wound left open    Packing materials:  None  Post-procedure details:     Procedure completion:  Tolerated well, no immediate complications

## 2022-11-08 ENCOUNTER — HOSPITAL ENCOUNTER (EMERGENCY)
Age: 27
Discharge: HOME | End: 2022-11-08
Attending: EMERGENCY MEDICINE
Payer: MEDICAID

## 2022-11-08 VITALS
BODY MASS INDEX: 50.02 KG/M2 | SYSTOLIC BLOOD PRESSURE: 179 MMHG | OXYGEN SATURATION: 98 % | RESPIRATION RATE: 20 BRPM | HEIGHT: 64 IN | DIASTOLIC BLOOD PRESSURE: 102 MMHG | TEMPERATURE: 98 F | WEIGHT: 293 LBS | HEART RATE: 105 BPM

## 2022-11-08 DIAGNOSIS — K02.9 DENTAL CARIES INTO PULP: ICD-10-CM

## 2022-11-08 DIAGNOSIS — K08.89 DENTALGIA: Primary | ICD-10-CM

## 2022-11-08 PROCEDURE — 99283 EMERGENCY DEPT VISIT LOW MDM: CPT

## 2022-11-08 RX ORDER — NAPROXEN 500 MG/1
500 TABLET ORAL
Qty: 20 TABLET | Refills: 0 | Status: SHIPPED | OUTPATIENT
Start: 2022-11-08 | End: 2022-11-18

## 2022-11-08 RX ORDER — CLINDAMYCIN HYDROCHLORIDE 300 MG/1
300 CAPSULE ORAL 3 TIMES DAILY
Qty: 21 CAPSULE | Refills: 0 | Status: SHIPPED | OUTPATIENT
Start: 2022-11-08 | End: 2022-11-15

## 2022-11-08 NOTE — ED PROVIDER NOTES
EMERGENCY DEPARTMENT HISTORY AND PHYSICAL EXAM    5:20 PM      Date: 11/8/2022  Patient Name: Tootie Hooks    History of Presenting Illness     Chief Complaint   Patient presents with    Dental Pain         History Provided By: Patient    Additional History (Context): Tootie Hooks is a 23 y.o. female with past medical history significant for asthma, diabetes, and obesity who presents with complaints of right upper dental pain ongoing for several weeks. She has been seen and treated for dental abscess in the past and taking antibiotics. She was last on Augmentin prescribed by [facility] which she completed about 10 days ago. She states that the swelling has gone down but the dental pain is not. She does have several cavities but has a known broken tooth to the right upper second molar. She has been referred to several dentist but has been unable to secure an appointment. She denies any facial swelling at this time and no fever or chills. No trouble swallowing. PCP: Yang Russell MD    Current Outpatient Medications   Medication Sig Dispense Refill    clindamycin (CLEOCIN) 300 mg capsule Take 1 Capsule by mouth three (3) times daily for 7 days. 21 Capsule 0    naproxen (Naprosyn) 500 mg tablet Take 1 Tablet by mouth two (2) times daily as needed for Pain for up to 10 days. 20 Tablet 0    ondansetron (ZOFRAN ODT) 8 mg disintegrating tablet Take 1 Tablet by mouth every eight (8) hours as needed for Nausea for up to 12 doses. 12 Tablet 0    amoxicillin 500 mg tab Take 500 mg by mouth three (3) times daily. 30 Tablet 0    metFORMIN (GLUCOPHAGE) 1,000 mg tablet       furosemide (LASIX) 20 mg tablet Take 20 mg by mouth daily as needed. DULoxetine (Cymbalta) 60 mg capsule Take 60 mg by mouth daily. gabapentin (Neurontin) 100 mg capsule Take 100 mg by mouth three (3) times daily. acetaminophen (TYLENOL) 325 mg tablet Take 2 Tabs by mouth every four (4) hours as needed for Pain.  21 Tab 0    ibuprofen (MOTRIN) 800 mg tablet Take 1 Tab by mouth every eight (8) hours as needed for Pain. 30 Tab 1       Past History     Past Medical History:  Past Medical History:   Diagnosis Date    Asthma     Diabetes ([address]        Past Surgical History:  Past Surgical History:   Procedure Laterality Date    HX APPENDECTOMY         Family History:  No family history on file. Social History:  Social History     Tobacco Use    Smoking status: Every Day    Smokeless tobacco: Never   Vaping Use    Vaping Use: Never used   Substance Use Topics    Alcohol use: Not Currently    Drug use: Not Currently       Allergies:  No Known Allergies      Review of Systems       Review of Systems   Constitutional: Negative. Negative for chills and fever. HENT:  Positive for dental problem. Negative for congestion, ear pain and rhinorrhea. Eyes: Negative. Negative for pain and redness. Respiratory: Negative. Negative for cough and shortness of breath. Cardiovascular: Negative. Negative for chest pain, palpitations and leg swelling. Gastrointestinal: Negative. Negative for abdominal pain, constipation, diarrhea, nausea and vomiting. Genitourinary: Negative. Negative for dysuria, frequency, hematuria and urgency. Musculoskeletal: Negative. Negative for back pain, gait problem, joint swelling and neck pain. Skin: Negative. Negative for rash and wound. Neurological: Negative. Negative for dizziness, seizures, speech difficulty, weakness, light-headedness and headaches. Hematological:  Negative for adenopathy. Does not bruise/bleed easily. All other systems reviewed and are negative. Physical Exam   Visit Vitals  BP (!) 179/102 (BP 1 Location: Left lower arm, BP Patient Position: Sitting)   Pulse (!) 105   Temp 98 °F (36.7 °C)   Resp 20   Ht 5' 4\" (1.626 m)   Wt (!) 201.9 kg (445 lb)   SpO2 98%   BMI 76.38 kg/m²         Physical Exam  Vitals and nursing note reviewed.    Constitutional: General: She is not in acute distress. Appearance: Normal appearance. She is obese. She is not toxic-appearing. HENT:      Head: Normocephalic and atraumatic. Jaw: There is normal jaw occlusion. No trismus, tenderness, swelling or pain on movement. Salivary Glands: Right salivary gland is not diffusely enlarged or tender. Left salivary gland is not diffusely enlarged or tender. Comments: No facial swelling     Right Ear: Tympanic membrane, ear canal and external ear normal. There is no impacted cerumen. Left Ear: Tympanic membrane, ear canal and external ear normal. There is no impacted cerumen. Nose: Nose normal. No congestion or rhinorrhea. Mouth/Throat:      Mouth: Mucous membranes are moist.      Dentition: Abnormal dentition. Dental tenderness and dental caries present. Pharynx: Oropharynx is clear. Uvula midline. No pharyngeal swelling, oropharyngeal exudate, posterior oropharyngeal erythema or uvula swelling. Tonsils: No tonsillar exudate or tonsillar abscesses. 0 on the right. 0 on the left. Eyes:      General:         Right eye: No discharge. Left eye: No discharge. Conjunctiva/sclera: Conjunctivae normal.      Pupils: Pupils are equal, round, and reactive to light. Cardiovascular:      Rate and Rhythm: Normal rate and regular rhythm. Pulmonary:      Effort: Pulmonary effort is normal.      Breath sounds: Normal breath sounds. Musculoskeletal:      Cervical back: Normal range of motion and neck supple. No edema or erythema. No muscular tenderness. Lymphadenopathy:      Cervical: No cervical adenopathy. Skin:     General: Skin is warm and dry. Capillary Refill: Capillary refill takes less than 2 seconds. Neurological:      General: No focal deficit present. Mental Status: She is alert.    Psychiatric:         Mood and Affect: Mood normal.         Behavior: Behavior normal.         Diagnostic Study Results     Labs -  No results found for this or any previous visit (from the past 12 hour(s)). Radiologic Studies -   No orders to display         Medical Decision Making   I am the first provider for this patient. I reviewed available nursing notes, past medical history, past surgical history, family history and social history. Vital Signs-Reviewed the patient's vital signs. Records Reviewed: Nursing Notes and Old Medical Records (Time of Review: 5:20 PM)    Pulse Oximetry Analysis - 98% on RA- normal     Cardiac Monitor:  Rate:   Rhythm:    EKG:    ED Course: Progress Notes, Reevaluation, and Consults:  5:20 PM  Initial assessment performed. The patients presenting problems have been discussed, and they/their family are in agreement with the care plan formulated and outlined with them. I have encouraged them to ask questions as they arise throughout their visit. Provider Notes (Medical Decision Making):     Differential diagnosis includes: Periapical abscess, pulpitis, odontogenic infection, sinusitis, trauma, alveolar osteitis, necrotizing gingivitis, retropharyngeal abscess, peritonsillar abscess. Patient presents ambulatory, no acute distress. Patient is well-hydrated and nontoxic in appearance. Exam reveals poor dentition with cracked tooth to the right upper second molar and multiple caries throughout on various surfaces. The right upper second molar does have pulp exposure which is likely causing this nerve pain. There is appropriate tenderness on palpation. No localized induration or fluctuance to suggest drainable abscess. No sublingual/submandibular induration, trismus, or stridor. Floor of mouth is soft. Nasolabial folds are soft with no facial swelling. Normal speech. Handling oral secretions without difficulty. Patient has full range of motion of the neck. Low suspicion for Kehinde's angina or deep space infection. Will discharge home with antibiotics and medication for pain.   Patient is advised to follow-up with a dentist/oral surgeon to further manage this condition. Diagnosis     Clinical Impression:   1. Dentalgia    2. Dental caries into pulp        Disposition: Discharged home in stable condition    DISCHARGE NOTE:     Patient has been reexamined. Patient has no new complaints, changes, or physical findings. Care plan outlined and precautions discussed. Results of physical exam findings were reviewed with the patient. All medications were reviewed with the patient; will discharge home with naproxen and clindamycin as she was just on Augmentin. All of patient's questions and concerns were addressed. Patient was instructed and agrees to follow up with PCP, as well as to return to the ED upon further deterioration. Patient is ready to go home. Follow-up Information       Follow up With Specialties Details Why [address]  Schedule an appointment as soon as possible for a visit  Follow-up from the Emergency Department Ezequiel Brown [phone] [facility]  Schedule an appointment as soon as possible for a visit  Follow-up from the Emergency Department [phone]  [phone]    [facility] EMERGENCY DEPT Emergency Medicine  As needed, If symptoms worsen [address] [facility] [address]  [phone]             Discharge Medication List as of 11/8/2022  5:19 PM        START taking these medications    Details   clindamycin (CLEOCIN) 300 mg capsule Take 1 Capsule by mouth three (3) times daily for 7 days. , Normal, Disp-21 Capsule, R-0      naproxen (Naprosyn) 500 mg tablet Take 1 Tablet by mouth two (2) times daily as needed for Pain for up to 10 days. , Normal, Disp-20 Tablet, R-0           CONTINUE these medications which have NOT CHANGED    Details   ondansetron (ZOFRAN ODT) 8 mg disintegrating tablet Take 1 Tablet by mouth every eight (8) hours as needed for Nausea for up to 12 doses. , No Print, Disp-12 Tablet, R-0      amoxicillin 500 mg tab Take 500 mg by mouth three (3) times daily. , Normal, Disp-30 Tablet, R-0      metFORMIN (GLUCOPHAGE) 1,000 mg tablet Historical Med      furosemide (LASIX) 20 mg tablet Take 20 mg by mouth daily as needed., Historical Med      DULoxetine (Cymbalta) 60 mg capsule Take 60 mg by mouth daily. , Historical Med      gabapentin (Neurontin) 100 mg capsule Take 100 mg by mouth three (3) times daily. , Historical Med      acetaminophen (TYLENOL) 325 mg tablet Take 2 Tabs by mouth every four (4) hours as needed for Pain., Normal, Disp-20 Tab, R-0      ibuprofen (MOTRIN) 800 mg tablet Take 1 Tab by mouth every eight (8) hours as needed for Pain., Normal, Disp-30 Tab, R-1               Dictation disclaimer:  Please note that this dictation was completed with [employer], the [employer] voice recognition software. Quite often unanticipated grammatical, syntax, homophones, and other interpretive errors are inadvertently transcribed by the computer software. Please disregard these errors. Please excuse any errors that have escaped final proofreading.

## 2022-11-08 NOTE — Clinical Note
[address] [place] [address] Dr [facility] EMERGENCY DEPT  [phone] [facility] [id number] [phone]    Work/School Note    Date: 11/8/2022    To Whom It May concern:    Felicita Manning was seen and treated today in the emergency room by the following provider(s):  Attending Provider: Brianna Millard MD  Nurse Practitioner: KIRSTEN Kevin. Felicita Manning is excused from work/school on 11/8/2022 through 11/10/2022. She is medically clear to return to work/school on 11/11/2022.          Sincerely,          KIRSTEN Jansen

## 2022-11-08 NOTE — ED NOTES
Received patient from triage with c/o tooth pain. Pt stated that this has been ongoing for several months and has received multiple courses of ABT therapy with no improvement.

## 2022-11-08 NOTE — DISCHARGE INSTRUCTIONS
[address] [facility]  [address]  [facility], [address]  Telephone: [phone]  [email or web address]  Dental Safety Net Listing Compiled and Updated by [employer] / August 2016  Services: Dental exams, x-rays, preventative treatment, restorative treatment (such as fillings and extractions),  and partial or complete dentures. An emergency appointment to alleviate pain is usually available within 24- 48  hours. Eligibility: Residents of [place] [address]. with proof of income, proof of residency, a photo ID and a $55  retainer is required. Children enrolled in the school lunch program are automatically eligible for services. Eligibility Screenings: Monday- Friday: 2 - 4PM  All dental visits are by appointment only once eligibility is verified. Fees: All insurance plans within the [place] network or a sliding scale fee based on income is available for the  uninsured.

## 2022-11-15 ENCOUNTER — HOSPITAL ENCOUNTER (EMERGENCY)
Age: 27
Discharge: HOME | End: 2022-11-15
Attending: STUDENT IN AN ORGANIZED HEALTH CARE EDUCATION/TRAINING PROGRAM
Payer: MEDICAID

## 2022-11-15 VITALS
BODY MASS INDEX: 50.02 KG/M2 | HEART RATE: 97 BPM | OXYGEN SATURATION: 97 % | DIASTOLIC BLOOD PRESSURE: 108 MMHG | TEMPERATURE: 97.9 F | RESPIRATION RATE: 16 BRPM | SYSTOLIC BLOOD PRESSURE: 151 MMHG | WEIGHT: 293 LBS | HEIGHT: 64 IN

## 2022-11-15 DIAGNOSIS — K08.89 PAIN, DENTAL: ICD-10-CM

## 2022-11-15 DIAGNOSIS — K02.9 DENTAL CARIES: Primary | ICD-10-CM

## 2022-11-15 PROCEDURE — 74011250637 HC RX REV CODE- 250/637: Performed by: PHYSICIAN ASSISTANT

## 2022-11-15 PROCEDURE — 99283 EMERGENCY DEPT VISIT LOW MDM: CPT

## 2022-11-15 RX ORDER — IBUPROFEN 600 MG/1
600 TABLET ORAL
Qty: 20 TABLET | Refills: 0 | Status: SHIPPED | OUTPATIENT
Start: 2022-11-15

## 2022-11-15 RX ORDER — ACETAMINOPHEN AND CODEINE PHOSPHATE 300; 30 MG/1; MG/1
1 TABLET ORAL
Qty: 8 TABLET | Refills: 0 | Status: SHIPPED | OUTPATIENT
Start: 2022-11-15 | End: 2022-11-20

## 2022-11-15 RX ORDER — ACETAMINOPHEN AND CODEINE PHOSPHATE 300; 30 MG/1; MG/1
1 TABLET ORAL
Status: COMPLETED | OUTPATIENT
Start: 2022-11-15 | End: 2022-11-15

## 2022-11-15 RX ORDER — IBUPROFEN 600 MG/1
600 TABLET ORAL
Status: COMPLETED | OUTPATIENT
Start: 2022-11-15 | End: 2022-11-15

## 2022-11-15 RX ADMIN — ACETAMINOPHEN AND CODEINE PHOSPHATE 1 TABLET: 300; 30 TABLET ORAL at 23:24

## 2022-11-15 RX ADMIN — IBUPROFEN 600 MG: 600 TABLET, FILM COATED ORAL at 23:23

## 2022-11-16 NOTE — ED PROVIDER NOTES
EMERGENCY DEPARTMENT HISTORY AND PHYSICAL EXAM    Date: 11/15/2022  Patient Name: Shirlene Ham    History of Presenting Illness     Chief Complaint   Patient presents with    Dental Pain         History Provided By: patient     Chief Complaint:dental pain   Duration: several months, worsening x a few days   Timing:  acute on chronic   Location: R upper molar region   Quality: sharp   Severity: severe  Modifying Factors: abx and naproxen have not helped  Associated Symptoms: none       Additional History (Context): Shirlene Ham is a 23 y.o. female with PMH asthma and DM who presents with c/o worsening dental pain x a few days. Pt was seen a week ago for the same complaint. She was prescribed naproxen and clinda, states she finished her abx but has not had any relief in her pain. No other complaints reported. PCP: Castillo Tyler MD    Current Outpatient Medications   Medication Sig Dispense Refill    acetaminophen-codeine (Tylenol-Codeine #3) 300-30 mg per tablet Take 1 Tablet by mouth every six (6) hours as needed for Pain for up to 5 days. Max Daily Amount: 4 Tablets. 8 Tablet 0    ibuprofen (MOTRIN) 600 mg tablet Take 1 Tablet by mouth every six (6) hours as needed for Pain. 20 Tablet 0    clindamycin (CLEOCIN) 300 mg capsule Take 1 Capsule by mouth three (3) times daily for 7 days. 21 Capsule 0    naproxen (Naprosyn) 500 mg tablet Take 1 Tablet by mouth two (2) times daily as needed for Pain for up to 10 days. 20 Tablet 0    ondansetron (ZOFRAN ODT) 8 mg disintegrating tablet Take 1 Tablet by mouth every eight (8) hours as needed for Nausea for up to 12 doses. 12 Tablet 0    amoxicillin 500 mg tab Take 500 mg by mouth three (3) times daily. 30 Tablet 0    metFORMIN (GLUCOPHAGE) 1,000 mg tablet       furosemide (LASIX) 20 mg tablet Take 20 mg by mouth daily as needed. DULoxetine (Cymbalta) 60 mg capsule Take 60 mg by mouth daily.       gabapentin (Neurontin) 100 mg capsule Take 100 mg by mouth three (3) times daily. acetaminophen (TYLENOL) 325 mg tablet Take 2 Tabs by mouth every four (4) hours as needed for Pain. 20 Tab 0       Past History     Past Medical History:  Past Medical History:   Diagnosis Date    Asthma     Diabetes ([address]        Past Surgical History:  Past Surgical History:   Procedure Laterality Date    HX APPENDECTOMY         Family History:  No family history on file. Social History:  Social History     Tobacco Use    Smoking status: Every Day    Smokeless tobacco: Never   Vaping Use    Vaping Use: Never used   Substance Use Topics    Alcohol use: Not Currently    Drug use: Not Currently       Allergies:  No Known Allergies      Review of Systems   Review of Systems   Constitutional: Negative. Negative for chills and fever. HENT:  Positive for dental problem. Negative for congestion, ear pain and rhinorrhea. Eyes: Negative. Negative for pain and redness. Respiratory: Negative. Negative for cough, shortness of breath, wheezing and stridor. Cardiovascular: Negative. Negative for chest pain and leg swelling. Gastrointestinal: Negative. Negative for abdominal pain, constipation, diarrhea, nausea and vomiting. Genitourinary: Negative. Negative for dysuria and frequency. Musculoskeletal: Negative. Negative for back pain and neck pain. Skin: Negative. Negative for rash and wound. Neurological: Negative. Negative for dizziness, seizures, syncope and headaches. All other systems reviewed and are negative. All Other Systems Negative  Physical Exam     Vitals:    11/15/22 2222   BP: (!) 151/108   Pulse: 97   Resp: 16   Temp: 97.9 °F (36.6 °C)   SpO2: 97%   Weight: (!) 202.3 kg (446 lb)   Height: 5' 4\" (1.626 m)     Physical Exam  Vitals and nursing note reviewed. Constitutional:       General: She is in acute distress. Appearance: She is well-developed. She is obese. She is not diaphoretic. HENT:      Head: Normocephalic and atraumatic. Mouth/Throat:      Mouth: Mucous membranes are moist.      Comments: Dental caries to the R upper far molar region, no abscess noted. Eyes:      General: No scleral icterus. Right eye: No discharge. Left eye: No discharge. Conjunctiva/sclera: Conjunctivae normal.   Cardiovascular:      Rate and Rhythm: Normal rate. Pulmonary:      Effort: Pulmonary effort is normal. No respiratory distress. Breath sounds: No stridor. Musculoskeletal:         General: Normal range of motion. Cervical back: Normal range of motion and neck supple. Skin:     General: Skin is warm and dry. Findings: No erythema or rash. Neurological:      General: No focal deficit present. Mental Status: She is alert and oriented to person, place, and time. Mental status is at baseline. Coordination: Coordination normal.      Comments: Gait is steady and patient exhibits no evidence of ataxia. Patient is able to ambulate without difficulty. No focal neurological deficit noted. No facial droop, slurred speech, or evidence of altered mentation noted on exam.       Psychiatric:         Behavior: Behavior normal.         Thought Content: Thought content normal.              Diagnostic Study Results     Labs -   No results found for this or any previous visit (from the past 12 hour(s)). Radiologic Studies -   No orders to display     CT Results  (Last 48 hours)      None          CXR Results  (Last 48 hours)      None              Medical Decision Making   I am the first provider for this patient. I reviewed the vital signs, available nursing notes, past medical history, past surgical history, family history and social history. Vital Signs-Reviewed the patient's vital signs. Records Reviewed: Anneliese Juan PA-C     Procedures:  Procedures    Provider Notes (Medical Decision Making):  Impression: dental pain, dental caries    Pt reassured she does not need another course of abx, she has a dental apt in a few weeks. Will d/c wit short course of pain control. Dental follow-up recommended. Pt agrees. Anneliese Juan PA-C     MED RECONCILIATION:  No current facility-administered medications for this encounter. Current Outpatient Medications   Medication Sig    acetaminophen-codeine (Tylenol-Codeine #3) 300-30 mg per tablet Take 1 Tablet by mouth every six (6) hours as needed for Pain for up to 5 days. Max Daily Amount: 4 Tablets. ibuprofen (MOTRIN) 600 mg tablet Take 1 Tablet by mouth every six (6) hours as needed for Pain. clindamycin (CLEOCIN) 300 mg capsule Take 1 Capsule by mouth three (3) times daily for 7 days. naproxen (Naprosyn) 500 mg tablet Take 1 Tablet by mouth two (2) times daily as needed for Pain for up to 10 days. ondansetron (ZOFRAN ODT) 8 mg disintegrating tablet Take 1 Tablet by mouth every eight (8) hours as needed for Nausea for up to 12 doses. amoxicillin 500 mg tab Take 500 mg by mouth three (3) times daily. metFORMIN (GLUCOPHAGE) 1,000 mg tablet     furosemide (LASIX) 20 mg tablet Take 20 mg by mouth daily as needed. DULoxetine (Cymbalta) 60 mg capsule Take 60 mg by mouth daily. gabapentin (Neurontin) 100 mg capsule Take 100 mg by mouth three (3) times daily. acetaminophen (TYLENOL) 325 mg tablet Take 2 Tabs by mouth every four (4) hours as needed for Pain. Disposition:  D/c    DISCHARGE NOTE:   Patient is stable for discharge at this time. I have discussed all the findings from today's work up with the patient, including lab results and imaging. I have answered all questions. Rx for tylenol # 3 given. Rest and close follow-up with the dentist recommended this week. Return to the ED immediately for any new or worsening symptoms.   April Hartford Merlin, PA-C     Follow-up Information       Follow up With Specialties Details Why Contact Info    [address]  In 1 week  Ezequiel Brown [phone] [facility] EMERGENCY DEPT Emergency Medicine  As needed, If symptoms worsen [address] [facility] [address]  [phone]            Discharge Medication List as of 11/15/2022 11:20 PM        START taking these medications    Details   acetaminophen-codeine (Tylenol-Codeine #3) 300-30 mg per tablet Take 1 Tablet by mouth every six (6) hours as needed for Pain for up to 5 days. Max Daily Amount: 4 Tablets., Normal, Disp-8 Tablet, R-0           CONTINUE these medications which have CHANGED    Details   ibuprofen (MOTRIN) 600 mg tablet Take 1 Tablet by mouth every six (6) hours as needed for Pain., Normal, Disp-20 Tablet, R-0           CONTINUE these medications which have NOT CHANGED    Details   clindamycin (CLEOCIN) 300 mg capsule Take 1 Capsule by mouth three (3) times daily for 7 days. , Normal, Disp-21 Capsule, R-0      naproxen (Naprosyn) 500 mg tablet Take 1 Tablet by mouth two (2) times daily as needed for Pain for up to 10 days. , Normal, Disp-20 Tablet, R-0      ondansetron (ZOFRAN ODT) 8 mg disintegrating tablet Take 1 Tablet by mouth every eight (8) hours as needed for Nausea for up to 12 doses. , No Print, Disp-12 Tablet, R-0      amoxicillin 500 mg tab Take 500 mg by mouth three (3) times daily. , Normal, Disp-30 Tablet, R-0      metFORMIN (GLUCOPHAGE) 1,000 mg tablet Historical Med      furosemide (LASIX) 20 mg tablet Take 20 mg by mouth daily as needed., Historical Med      DULoxetine (Cymbalta) 60 mg capsule Take 60 mg by mouth daily. , Historical Med      gabapentin (Neurontin) 100 mg capsule Take 100 mg by mouth three (3) times daily. , Historical Med      acetaminophen (TYLENOL) 325 mg tablet Take 2 Tabs by mouth every four (4) hours as needed for Pain., Normal, Disp-20 Tab, R-0               Diagnosis     Clinical Impression:   1. Dental caries    2.  Pain, dental

## 2023-02-11 ENCOUNTER — HOSPITAL ENCOUNTER (EMERGENCY)
Facility: HOSPITAL | Age: 28
Discharge: HOME | End: 2023-02-11
Attending: EMERGENCY MEDICINE
Payer: MEDICAID

## 2023-02-11 VITALS
HEART RATE: 99 BPM | BODY MASS INDEX: 50.02 KG/M2 | OXYGEN SATURATION: 99 % | HEIGHT: 64 IN | RESPIRATION RATE: 16 BRPM | DIASTOLIC BLOOD PRESSURE: 105 MMHG | TEMPERATURE: 97.9 F | SYSTOLIC BLOOD PRESSURE: 174 MMHG | WEIGHT: 293 LBS

## 2023-02-11 DIAGNOSIS — K08.89 DENTALGIA: Primary | ICD-10-CM

## 2023-02-11 PROCEDURE — 6370000000 HC RX 637 (ALT 250 FOR IP): Performed by: PHYSICIAN ASSISTANT

## 2023-02-11 PROCEDURE — 99283 EMERGENCY DEPT VISIT LOW MDM: CPT

## 2023-02-11 RX ORDER — OXYCODONE HYDROCHLORIDE AND ACETAMINOPHEN 5; 325 MG/1; MG/1
1 TABLET ORAL
Status: COMPLETED | OUTPATIENT
Start: 2023-02-11 | End: 2023-02-11

## 2023-02-11 RX ADMIN — OXYCODONE AND ACETAMINOPHEN 1 TABLET: 325; 5 TABLET ORAL at 20:11

## 2023-02-12 ASSESSMENT — ENCOUNTER SYMPTOMS
VOMITING: 0
ABDOMINAL PAIN: 0
SHORTNESS OF BREATH: 0
SORE THROAT: 0
DIARRHEA: 0

## 2023-02-12 NOTE — ED TRIAGE NOTES
Patient comes in stating that she had 3 teeth pulled this morning. Patient states that her dentist sent the prescriptions to the [address] that is closed all day. Patient is unable to get the mediations prescribed.

## 2023-04-30 ENCOUNTER — HOSPITAL ENCOUNTER (EMERGENCY)
Facility: HOSPITAL | Age: 28
Discharge: HOME | End: 2023-04-30
Attending: EMERGENCY MEDICINE
Payer: MEDICAID

## 2023-04-30 VITALS
SYSTOLIC BLOOD PRESSURE: 188 MMHG | DIASTOLIC BLOOD PRESSURE: 84 MMHG | WEIGHT: 293 LBS | HEART RATE: 93 BPM | RESPIRATION RATE: 20 BRPM | TEMPERATURE: 97.9 F | HEIGHT: 63 IN | OXYGEN SATURATION: 99 % | BODY MASS INDEX: 51.91 KG/M2

## 2023-04-30 DIAGNOSIS — M79.604 BILATERAL LEG PAIN: Primary | ICD-10-CM

## 2023-04-30 DIAGNOSIS — M79.605 BILATERAL LEG PAIN: Primary | ICD-10-CM

## 2023-04-30 PROCEDURE — 99284 EMERGENCY DEPT VISIT MOD MDM: CPT

## 2023-04-30 PROCEDURE — 6360000002 HC RX W HCPCS: Performed by: PHYSICIAN ASSISTANT

## 2023-04-30 PROCEDURE — 96372 THER/PROPH/DIAG INJ SC/IM: CPT

## 2023-04-30 RX ORDER — KETOROLAC TROMETHAMINE 15 MG/ML
15 INJECTION, SOLUTION INTRAMUSCULAR; INTRAVENOUS
Status: COMPLETED | OUTPATIENT
Start: 2023-04-30 | End: 2023-04-30

## 2023-04-30 RX ORDER — KETOROLAC TROMETHAMINE 10 MG/1
10 TABLET, FILM COATED ORAL EVERY 6 HOURS PRN
Qty: 20 TABLET | Refills: 0 | Status: SHIPPED | OUTPATIENT
Start: 2023-04-30

## 2023-04-30 RX ORDER — OXYCODONE HYDROCHLORIDE AND ACETAMINOPHEN 5; 325 MG/1; MG/1
1 TABLET ORAL EVERY 8 HOURS PRN
Qty: 8 TABLET | Refills: 0 | Status: SHIPPED | OUTPATIENT
Start: 2023-04-30 | End: 2023-05-03

## 2023-04-30 RX ADMIN — KETOROLAC TROMETHAMINE 15 MG: 15 INJECTION, SOLUTION INTRAMUSCULAR; INTRAVENOUS at 11:42

## 2023-04-30 ASSESSMENT — ENCOUNTER SYMPTOMS
DIARRHEA: 0
SHORTNESS OF BREATH: 0
NAUSEA: 0
SORE THROAT: 0
WHEEZING: 0
ABDOMINAL DISTENTION: 0
VOMITING: 0
EYE DISCHARGE: 0

## 2023-04-30 ASSESSMENT — PAIN DESCRIPTION - ORIENTATION: ORIENTATION: RIGHT;LEFT

## 2023-04-30 ASSESSMENT — PAIN DESCRIPTION - DESCRIPTORS: DESCRIPTORS: SHARP

## 2023-04-30 ASSESSMENT — PAIN SCALES - GENERAL: PAINLEVEL_OUTOF10: 10

## 2023-04-30 ASSESSMENT — PAIN DESCRIPTION - LOCATION: LOCATION: LEG

## 2023-12-27 NOTE — ED PROVIDER NOTES
EMERGENCY DEPARTMENT HISTORY AND PHYSICAL EXAM      Date: 2/11/2023  Patient Name: Bambi Cooper    History of Presenting Illness     Chief Complaint   Patient presents with    Dental Pain       History (Context): Bambi Cooper is a 33 y.o. female with a past medical history significant for asthma, DM presents ambulatory the ED today. Patient reports she had 3 teeth extracted this morning. Patient reports her antibiotic and pain prescription was sent to [facility], but when she attempted to  her rx, she discovered the pharmacy was closed. Patient was unable to  medications. Patient states the pharmacy will be open tomorrow and she will pick them up first thing tomorrow morning. Patient requesting pain medication. Denies trouble swallowing, fever, chills. PCP: Trace Turner MD    No current facility-administered medications for this encounter. Current Outpatient Medications   Medication Sig Dispense Refill    acetaminophen (TYLENOL) 325 MG tablet Take 650 mg by mouth every 4 hours as needed      Amoxicillin 500 MG TABS Take 500 mg by mouth 3 times daily      DULoxetine (CYMBALTA) 60 MG extended release capsule Take 60 mg by mouth daily      furosemide (LASIX) 20 MG tablet Take 20 mg by mouth daily as needed      gabapentin (NEURONTIN) 100 MG capsule Take 100 mg by mouth 3 times daily.       ibuprofen (ADVIL;MOTRIN) 600 MG tablet Take 600 mg by mouth every 6 hours as needed      metFORMIN (GLUCOPHAGE) 1000 MG tablet ceived the following from [facility]: Outside name: metFORMIN (GLUCOPHAGE) 1,000 mg tablet      ondansetron (ZOFRAN-ODT) 8 MG TBDP disintegrating tablet Take 8 mg by mouth every 8 hours as needed         Past History     Past Medical History:   Past Medical History:   Diagnosis Date    Asthma     Diabetes ([facility] [address]        Past Surgical History:  Past Surgical History:   Procedure Laterality Date    APPENDECTOMY         Family History:  No family history on file. Social History:   Social History     Tobacco Use    Smoking status: Every Day    Smokeless tobacco: Never   Substance Use Topics    Alcohol use: Not Currently    Drug use: Not Currently       Allergies:  No Known Allergies    PMH, PSH, family history, social history, allergies reviewed with the patient with significant items noted above. Review of Systems   Review of Systems   Constitutional:  Negative for activity change and fatigue. HENT:  Positive for dental problem. Negative for congestion and sore throat. Respiratory:  Negative for shortness of breath. Cardiovascular:  Negative for chest pain. Gastrointestinal:  Negative for abdominal pain, diarrhea and vomiting. Genitourinary:  Negative for dysuria and vaginal discharge. Musculoskeletal:  Negative for arthralgias and myalgias. Skin:  Negative for wound. Neurological:  Negative for dizziness, syncope and headaches. All other systems reviewed and are negative. Physical Exam     Vitals:    02/11/23 1926   BP: (!) 174/105   Pulse: 99   Resp: 16   Temp: 97.9 °F (36.6 °C)   TempSrc: Oral   SpO2: 99%   Weight: (!) 445 lb (201.9 kg)   Height: 5' 4\" (1.626 m)       Physical Exam  Vitals and nursing note reviewed. Constitutional:       Appearance: Normal appearance. Comments: Pt in NAD   HENT:      Head: Normocephalic and atraumatic. Mouth/Throat:        Comments: No sublingual or submandibular swelling  No hard or soft palate swelling  Maintaining airway without difficulty  No trismus   Eyes:      General: No scleral icterus. Cardiovascular:      Rate and Rhythm: Normal rate and regular rhythm. Pulses: No decreased pulses. Pulmonary:      Effort: Pulmonary effort is normal.      Breath sounds: Normal breath sounds and air entry. Abdominal:      General: There is no distension. Palpations: Abdomen is soft. Tenderness: There is no abdominal tenderness.    Musculoskeletal:      Cervical back: Full passive range of motion without pain. Right lower leg: No edema. Left lower leg: No edema. Skin:     General: Skin is warm and dry. Capillary Refill: Capillary refill takes less than 2 seconds. Neurological:      Mental Status: She is alert and oriented to person, place, and time. Mental status is at baseline. Psychiatric:         Attention and Perception: Attention normal.       Diagnostic Study Results     Labs -   No results found for this or any previous visit (from the past 12 hour(s)). Labs Reviewed - No data to display    Radiologic Studies -       The laboratory results, imaging results, and other diagnostic exams were reviewed in the EMR. Medical Decision Making   I am the first provider for this patient. I reviewed the vital signs, available nursing notes, past medical history, past surgical history, family history and social history. Vital Signs-Reviewed the patient's vital signs. Records Reviewed: Personally, on initial evaluation    MDM:   Priyanka Cho presents with complaint of left upper dental pain after tooth extraction earlier today. DDX includes but is not limited to: Post op pain, Secondary infection    Will obtain lab work and imaging for further evaluation of patients complaint. Will continue to monitor and evaluate patient while in the ED. Orders as below:  No orders of the defined types were placed in this encounter. 34 yo F who presents with left upper dental pain after tooth extraction earlier today. On exam mild bleeding present. No signs of secondary infection. No signs of deeper infection. We will treat patient symptomatically and stressed importance of taking medication as prescribed tomorrow. At time of discharge, pt non-toxic appearing in NAD. Pt stable for prompt outpatient follow-up with PCP 1 to 2 days. Patient given strict instructions to return if symptoms worsen.       ED Course:        Procedures:  Procedures      Supplemental Historians include: None       Documentation/Prior Results Review:  Previous electrocardiograms. Nursing notes. Previous radiology studies. Diagnosis and Disposition     CLINICAL IMPRESSION:  1. Dentalgia         Medication List        ASK your doctor about these medications      acetaminophen 325 MG tablet  Commonly known as: TYLENOL     Amoxicillin 500 MG Tabs     DULoxetine 60 MG extended release capsule  Commonly known as: CYMBALTA     furosemide 20 MG tablet  Commonly known as: LASIX     gabapentin 100 MG capsule  Commonly known as: NEURONTIN     ibuprofen 600 MG tablet  Commonly known as: ADVIL;MOTRIN     metFORMIN 1000 MG tablet  Commonly known as: GLUCOPHAGE     ondansetron 8 MG Tbdp disintegrating tablet  Commonly known as: ZOFRAN-ODT              Disposition: Discharged to home    Patient condition at time of disposition: Stable    DISCHARGE NOTE:   Pt has been reexamined. Patient has no new complaints, changes, or physical findings. Care plan outlined and precautions discussed. Results were reviewed with the patient. All medications were reviewed with the patient. All of pt's questions and concerns were addressed. Alarm symptoms and return precautions associated with chief complaint and evaluation were reviewed with the patient in detail. The patient demonstrated adequate understanding. Patient was instructed to follow up with PCP, as well as given strict return precautions to the ED upon further deterioration. Patient is ready for discharge home. depict Disclaimer     Please note that this dictation was completed with depict, the computer voice recognition software. Quite often unanticipated grammatical, syntax, homophones, and other interpretive errors are inadvertently transcribed by the computer software. Please disregard these errors. Please excuse any errors that have escaped final proofreading.       Rhonda Smith PA-C       [place], [place]  02/12/23 1796 27-Dec-2023 19:07

## 2024-03-03 NOTE — ED NOTES
Pt is alert and oriented x4, assist of 1 with transfers. Pt reported pain 7/10 to LUE after using bathroom, requested IV Dilaudid, given, effective. Pt is voiding, had bm. CMS intact to LUE. Pt is doing exercises. Edema +3, elevated on pillows. VSS on RA. Plan to discharge to TCU, pending placement.    Report given to Transportation staff; pt to be transported to [facility].

## 2024-09-11 NOTE — PROGRESS NOTES
[facility]  [facility]    Patient: Karen Phillips Age: 23 y.o. : 1995 MR#: [id number] SSN: [id number]  Date/Time: 2022     Subjective:     Accu-Chek 225 - 307. Wound cx pending. Patient is off oxygen. Discussed with patient over the phone 11:10 AM.  \"I'm a little sore under my breast, but otherwise feeling okay. \"  Cough is \"a lot better. \" mucous improving, \"not bloody like it was. \"     In the afternoon, patient seen and examined at bedside, she states she is feeling a lot better. Expresses understanding regarding OR tomorrow. Also discussed that she will be referred to a nutritionist as an outpatient, she expressed understanding. .     Started on vancomycin per ID recommendations. Assessment/Plan:     1. COVID-19 pneumonia-continue IV Decadron. s/p Tocilizumab. antibiotics completed. Monitor inflammatory markers. pulmonology follows. Needs repeat imaging in 6 weeks. 2. Acute hypoxic respiratory failure secondary to COVID-19 pneumonia-continue supplemental oxygen on 4 L. Pulmonology follows. 3. History of neuropathy-continue Neurontin and Cymbalta. 4. DM2 -A1c 12, continue Lantus (reduced dose tonight), and ssi, diabetic education on board. Will need insulin at discharge. Outpatient follow-up with nutritionist.  5. Super morbid obesity Body mass index is 75.53 kg/m². 6. Poor vision. Outpatient follow-up with [facility]. 7. Lower extremity edema. Lasix  8. Tobacco use disorder. Smoking cessation education  9. Elevated TSH, 3.98, normal T4. Repeat TFTs 4 to 6 weeks in the outpatient setting. 10. Not immunized for covid. 11. Abscess under left breast -follow wound culture. On vancomycin per ID recommendations. N.p.o. after midnight for abscess drainage tomorrow. Surgery follows, input appreciated. 12. Boil to anterior thigh, improving. DVT prophylaxis-Lovenox, increased dose. Full code.   Assess home oxygen needs prior to discharge. I discussed case with Dr. Kevin Lopez. [employer]. Discussed on IDT. Hai Dawson MD  22      Case discussed with:  [x]Patient  [x]Family  [x]Nursing  [x]Case Management  DVT Prophylaxis:  [x]Lovenox  []Hep SQ  []SCDs  []Coumadin   []On Heparin gtt    Objective:   VS:   Visit Vitals  /63 (BP 1 Location: Right upper arm, BP Patient Position: At rest)   Pulse (!) 54   Temp 97.4 °F (36.3 °C)   Resp 18   Ht 5' 4\" (1.626 m)   Wt (!) 199.6 kg (440 lb)   SpO2 95%   Breastfeeding No   BMI 75.53 kg/m²      Tmax/24hrs: Temp (24hrs), Av.6 °F (36.4 °C), Min:97.4 °F (36.3 °C), Max:98.1 °F (36.7 °C)  IOBRIEF  No intake or output data in the 24 hours ending 22 0950    General: Awake and alert, found sitting up in chair speaking in full sentences on room air. HEENT: PERRLA, anicteric sclerae. Pulmonary: Decreased breath sounds at bases  Cardiovascular: Regular rate and Rhythm. GI:  Soft, Non distended, Non tender. Nabs  Extremities: Lower extremity edema bilaterally. Neurologic: Alert and oriented X 4. No acute neuro deficits. Skin: 3 cm area of erythema to right upper anterior thigh, rosemarie pus expressed. Copious drainage of rosemarie pus from under left breast, with surrounding erythema, induration, warmth.     Medications:   Current Facility-Administered Medications   Medication Dose Route Frequency    nystatin (MYCOSTATIN) 100,000 unit/gram powder   Topical BID    ceFAZolin (ANCEF) 3 g in sterile water (preservative free) 30 mL IV syringe  3 g IntraVENous Q8H    vancomycin (VANCOCIN) 1500 mg in  ml infusion  1,500 mg IntraVENous Q8H    insulin glargine (LANTUS) injection 45 Units  45 Units SubCUTAneous Q12H    furosemide (LASIX) tablet 20 mg  20 mg Oral DAILY    ipratropium-albuterol (COMBIVENT RESPIMAT) 20 mcg-100 mcg inhalation spray  1 Puff Inhalation Q6H RT    insulin lispro (HUMALOG) injection 12 Units  12 Units SubCUTAneous TIDAC    enoxaparin (LOVENOX) injection 40 mg  40 mg SubCUTAneous Q12H    dexamethasone (DECADRON) 4 mg/mL injection 10 mg  10 mg IntraVENous Q24H    budesonide-formoteroL (SYMBICORT) 160-4.5 mcg/actuation HFA inhaler 2 Puff  2 Puff Inhalation BID RT    sodium chloride (NS) flush 5-10 mL  5-10 mL IntraVENous PRN    insulin lispro (HUMALOG) injection   SubCUTAneous AC&HS    glucose chewable tablet 16 g  16 g Oral PRN    glucagon (GLUCAGEN) injection 1 mg  1 mg IntraMUSCular PRN    dextrose 10% infusion 125-250 mL  125-250 mL IntraVENous PRN    acetaminophen (TYLENOL) tablet 500 mg  500 mg Oral Q6H PRN    ondansetron (ZOFRAN) injection 4 mg  4 mg IntraVENous Q4H PRN    DULoxetine (CYMBALTA) capsule 30 mg  30 mg Oral DAILY    gabapentin (NEURONTIN) capsule 100 mg  100 mg Oral TID    omega-3 acid ethyl esters (LOVAZA) capsule 1,000 mg  1 g Oral BID WITH MEALS    hydrALAZINE (APRESOLINE) tablet 25 mg  25 mg Oral TID PRN       Imaging:   XR Results (most recent):  Results from Hospital Encounter encounter on 01/30/22    XR CHEST PORT    Narrative  EXAM: XR CHEST PORT    INDICATION: 26 years Female. cough. ADDITIONAL HISTORY: None. TECHNIQUE: Frontal view of the chest.    COMPARISON: 7/19/2021    FINDINGS:    Underpenetration of the lung bases. Obscuration of the left heart border and  left hemidiaphragm. The cardiac silhouette appears similar to prior. Dense opacity is at the left  lung base, blunting of the left costophrenic sulcus. Hazy opacity at the right  lung base. Diffuse bilateral interstitial opacities within the left greater than  right lungs. Additional airspace opacity at the peripheral aspect of the left  mid/upper lung zone. The right costophrenic sulcus is sharp. No definite pneumothorax. No acute osseous abnormality appreciated. Impression  1. Multifocal patchy airspace opacities involving the left greater than right  lung bases as well as the left midlung zone. Background of diffuse bilateral  interstitial opacities. Findings are most suspicious for multifocal pneumonia,  possible Covid 19 pneumonia; although other infectious or inflammatory process  is possible. Recommend radiographic follow-up within 6-8 weeks after appropriate  medical therapy and imaging follow-up until clearance. 2.  Possible left pleural effusion. CT Results (most recent):  Results from [place] encounter on 01/30/22    CTA CHEST W OR W WO CONT    Narrative  CTA CHEST PULMONARY EMBOLISM PROTOCOL    INDICATION: Morbid obesity, asthma with shortness of breath, wheezing, diarrhea,  cough, headache, myalgias, decreased appetite, loss of taste and smell, nausea,  vomiting, question of Covid. Question pulmonary embolism. TECHNIQUE: Thin collimation axial images obtained through the level of the  pulmonary arteries with additional imaging through the chest following the  uneventful administration of intravenous contrast.  Images reconstructed into  three dimensional coronal and sagittal projections for complete evaluation of  the tortuous and overlapping pulmonary vascular structures and to reduce patient  radiation dose. All CT scans at this facility are performed using dose optimization technique as  appropriate to a performed exam, to include automated exposure control,  adjustment of the mA and/or kV according to patient size (including appropriate  matching first site-specific examinations), or use of iterative reconstruction  technique. COMPARISON: 2/1/2021    FINDINGS: Evaluation limited by body habitus. There is suboptimal contrast density to assess for chronic filling defects and  respiratory motion also limits assessment. No visible pulmonary emboli, though  many of the segments are nondiagnostic. Thyroid: Unremarkable in its visualized aspects. Pericardium/ Heart: No significant effusion. Aorta/ Vessels: No aneurysm or dissection. Lymph Nodes: 1.1 cm left periaortic node. 1.2 cm subcarinal node.  1.4 cm right  hilar No node..    Lungs: There are bilateral dense lung consolidations, left greater than right. The majority of the left lung is affected. Large consolidation in the right  middle lobe. Pleura: Trace bilateral pleural effusions. No pneumothorax. Upper Abdomen: Hepatic steatosis. The spleen is not completely included in the  field-of-view but may be enlarged. Bones/soft tissues: Degenerative disc disease. .    Impression  1. No visible acute pulmonary emboli, though several of the segments are  nondiagnostic secondary to respiratory motion. 2.  Multifocal pneumonia which may be bacterial or viral. If viral, is more  advanced given the dense consolidations. 3.  Trace bilateral pleural effusions which are atypical in Covid 19.  4.  Mild lymphadenopathy, likely reactive, also atypical in Covid 19.  5.  Hepatic steatosis. 6.  Incompletely visualized but potential splenomegaly.            Labs:    Recent Results (from the past 48 hour(s))   GLUCOSE, POC    Collection Time: 02/04/22 12:13 PM   Result Value Ref Range    Glucose (POC) 364 (H) 70 - 110 mg/dL   GLUCOSE, POC    Collection Time: 02/04/22  3:23 PM   Result Value Ref Range    Glucose (POC) 312 (H) 70 - 110 mg/dL   GLUCOSE, POC    Collection Time: 02/04/22  9:54 PM   Result Value Ref Range    Glucose (POC) 314 (H) 70 - 110 mg/dL   C REACTIVE PROTEIN, QT    Collection Time: 02/05/22  4:50 AM   Result Value Ref Range    C-Reactive protein <0.3 0 - 0.3 mg/dL   PROCALCITONIN    Collection Time: 02/05/22  4:50 AM   Result Value Ref Range    Procalcitonin <0.05 ng/mL   D DIMER    Collection Time: 02/05/22  4:50 AM   Result Value Ref Range    D DIMER 0.48 (H) <0.46 ug/ml(FEU)   TSH 3RD GENERATION    Collection Time: 02/05/22  4:50 AM   Result Value Ref Range    TSH 3.98 (H) 0.36 - 3.74 uIU/mL   T4, FREE    Collection Time: 02/05/22  4:50 AM   Result Value Ref Range    T4, Free 0.7 0.7 - 1.5 NG/DL   GLUCOSE, POC    Collection Time: 02/05/22  7:56 AM   Result Value Ref Range    Glucose (POC) 192 (H) 70 - 110 mg/dL   GLUCOSE, POC    Collection Time: 02/05/22 11:43 AM   Result Value Ref Range    Glucose (POC) 165 (H) 70 - 110 mg/dL   GLUCOSE, POC    Collection Time: 02/05/22  3:36 PM   Result Value Ref Range    Glucose (POC) 232 (H) 70 - 110 mg/dL   GLUCOSE, POC    Collection Time: 02/05/22 10:29 PM   Result Value Ref Range    Glucose (POC) 307 (H) 70 - 110 mg/dL   C REACTIVE PROTEIN, QT    Collection Time: 02/06/22 12:51 AM   Result Value Ref Range    C-Reactive protein <0.3 0 - 0.3 mg/dL   PROCALCITONIN    Collection Time: 02/06/22 12:51 AM   Result Value Ref Range    Procalcitonin <0.05 ng/mL   D DIMER    Collection Time: 02/06/22 12:51 AM   Result Value Ref Range    D DIMER 0.51 (H) <0.46 ug/ml(FEU)   CBC WITH AUTOMATED DIFF    Collection Time: 02/06/22 12:51 AM   Result Value Ref Range    WBC 12.5 4.6 - 13.2 K/uL    RBC 4.86 4.20 - 5.30 M/uL    HGB 13.7 12.0 - 16.0 g/dL    HCT 43.5 35.0 - 45.0 %    MCV 89.5 78.0 - 100.0 FL    MCH 28.2 24.0 - 34.0 PG    MCHC 31.5 31.0 - 37.0 g/dL    RDW 13.8 11.6 - 14.5 %    PLATELET 319 186 - 853 K/uL    MPV 9.7 9.2 - 11.8 FL    NRBC 0.0 0  WBC    ABSOLUTE NRBC 0.00 0.00 - 0.01 K/uL    NEUTROPHILS 75 (H) 40 - 73 %    LYMPHOCYTES 16 (L) 21 - 52 %    MONOCYTES 9 3 - 10 %    EOSINOPHILS 0 0 - 5 %    BASOPHILS 0 0 - 2 %    IMMATURE GRANULOCYTES 0 %    ABS. NEUTROPHILS 9.4 (H) 1.8 - 8.0 K/UL    ABS. LYMPHOCYTES 2.0 0.9 - 3.6 K/UL    ABS. MONOCYTES 1.1 0.05 - 1.2 K/UL    ABS. EOSINOPHILS 0.0 0.0 - 0.4 K/UL    ABS. BASOPHILS 0.0 0.0 - 0.1 K/UL    ABS. IMM.  GRANS. 0.0 K/UL    DF MANUAL      PLATELET COMMENTS ADEQUATE PLATELETS      RBC COMMENTS NORMOCYTIC, NORMOCHROMIC     METABOLIC PANEL, BASIC    Collection Time: 02/06/22 12:51 AM   Result Value Ref Range    Sodium 133 (L) 136 - 145 mmol/L    Potassium 3.9 3.5 - 5.5 mmol/L    Chloride 99 (L) 100 - 111 mmol/L    CO2 25 21 - 32 mmol/L    Anion gap 9 3.0 - 18 mmol/L    Glucose 301 (H) 74 - 99 mg/dL    BUN 20 (H) 7.0 - 18 MG/DL    Creatinine 0.73 0.6 - 1.3 MG/DL    BUN/Creatinine ratio 27 (H) 12 - 20      GFR est AA >60 >60 ml/min/1.73m2    GFR est non-AA >60 >60 ml/min/1.73m2    Calcium 8.2 (L) 8.5 - 10.1 MG/DL   PHOSPHORUS    Collection Time: 02/06/22 12:51 AM   Result Value Ref Range    Phosphorus 3.5 2.5 - 4.9 MG/DL   PROTHROMBIN TIME + INR    Collection Time: 02/06/22 12:51 AM   Result Value Ref Range    Prothrombin time 13.4 11.5 - 15.2 sec    INR 1.0 0.8 - 1.2     GLUCOSE, POC    Collection Time: 02/06/22  3:23 AM   Result Value Ref Range    Glucose (POC) 295 (H) 70 - 110 mg/dL   GLUCOSE, POC    Collection Time: 02/06/22  7:39 AM   Result Value Ref Range    Glucose (POC) 225 (H) 70 - 110 mg/dL       Signed By: Romel Lewis MD     February 6, 2022      I spent 25 minutes with the patient in face-to-face consultation, of which greater than 50% was spent in counseling and coordination of care as described above    Disclaimer: Sections of this note are dictated using utilizing voice recognition software. Minor typographical errors may be present. If questions arise, please do not hesitate to contact me or call our department.

## 2025-04-25 NOTE — DISCHARGE INSTRUCTIONS
[employer] Activation    Thank you for requesting access to [employer]. Please follow the instructions below to securely access and download your online medical record. [employer] allows you to send messages to your doctor, view your test results, renew your prescriptions, schedule appointments, and more. How Do I Sign Up? In your internet browser, go to [email or web address]  Click on the First Time User? Click Here link in the Sign In box. You will be redirect to the New Member Sign Up page. Enter your [employer] Access Code exactly as it appears below. You will not need to use this code after youve completed the sign-up process. If you do not sign up before the expiration date, you must request a new code. [employer] Access Code: Activation code not generated  Current [employer] Status: Active (This is the date your [employer] access code will )    Enter the last four digits of your Social Security Number (xxxx) and Date of Birth (mm/dd/yyyy) as indicated and click Submit. You will be taken to the next sign-up page. Create a [employer] ID. This will be your [employer] login ID and cannot be changed, so think of one that is secure and easy to remember. Create a [employer] password. You can change your password at any time. Enter your Password Reset Question and Answer. This can be used at a later time if you forget your password. Enter your e-mail address. You will receive e-mail notification when new information is available in [address] Click Sign Up. You can now view and download portions of your medical record. Click the [place] link to download a portable copy of your medical information. Additional Information    If you have questions, please visit the Frequently Asked Questions section of the [employer] website at [email or web address] Remember, [employer] is NOT to be used for urgent needs. For medical emergencies, dial 911. [All other systems negative] : All other systems negative

## (undated) DEVICE — SMOKE EVACUATION PENCIL: Brand: VALLEYLAB

## (undated) DEVICE — SPECIMEN COLLECTION 4-PORT MANIFOLD: Brand: NEPTUNE 2

## (undated) DEVICE — 1016 S-DRAPE IRRIG POUCH 10/BOX: Brand: STERI-DRAPE™

## (undated) DEVICE — Device

## (undated) DEVICE — SYR LR LCK 1ML GRAD NSAF 30ML --

## (undated) DEVICE — SPONGE LAP 18X18IN STRL -- 5/PK

## (undated) DEVICE — SWAB CULT LIQ STUART AGR AERB MOD IN BRK SGL RAYON TIP PLAS 220099] BECTON DICKINSON MICRO]

## (undated) DEVICE — SKIN PREP POV IOD SOL BTL 4OZ --

## (undated) DEVICE — INTENDED FOR TISSUE SEPARATION, AND OTHER PROCEDURES THAT REQUIRE A SHARP SURGICAL BLADE TO PUNCTURE OR CUT.: Brand: BARD-PARKER ® CARBON RIB-BACK BLADES

## (undated) DEVICE — CULTURETTE SGL EVAC TUBE PALL -- 100/CA

## (undated) DEVICE — PACK PROCEDURE SURG MAJ W/ BASIN LF

## (undated) DEVICE — SOLUTION IRRIG 3000ML 0.9% SOD CHL FLX CONT 0797208] ICU MEDICAL INC]

## (undated) DEVICE — KIT CLN UP BON SECOURS MARYV

## (undated) DEVICE — SKIN PREP TRAY 4 COMPARTM TRAY: Brand: MEDLINE INDUSTRIES, INC.

## (undated) DEVICE — COVER LT HNDL BLU STRL -- MEDICHOICE

## (undated) DEVICE — PAD,ABDOMINAL,8"X10",ST,LF: Brand: MEDLINE

## (undated) DEVICE — SHEET, DRAPE, SPLIT, STERILE: Brand: MEDLINE

## (undated) DEVICE — STERILE POLYISOPRENE POWDER-FREE SURGICAL GLOVES: Brand: PROTEXIS

## (undated) DEVICE — DRAPE TOWEL: Brand: CONVERTORS

## (undated) DEVICE — BANDAGE,GAUZE,BULKEE II,4.5"X4.1YD,STRL: Brand: MEDLINE

## (undated) DEVICE — REM POLYHESIVE ADULT PATIENT RETURN ELECTRODE: Brand: VALLEYLAB

## (undated) DEVICE — SOLUTION IV 1000ML 0.9% SOD CHL

## (undated) DEVICE — INTENDED FOR TISSUE SEPARATION, AND OTHER PROCEDURES THAT REQUIRE A SHARP SURGICAL BLADE TO PUNCTURE OR CUT.: Brand: BARD-PARKER SAFETY BLADES SIZE 10, STERILE

## (undated) DEVICE — HANDPIECE SET WITH HIGH FLOW TIP AND SUCTION TUBE: Brand: INTERPULSE

## (undated) DEVICE — GAUZE,SPONGE,4"X4",16PLY,STRL,LF,10/TRAY: Brand: MEDLINE

## (undated) DEVICE — DRAPE,REIN 53X77,STERILE: Brand: MEDLINE

## (undated) DEVICE — GLOVE SURG SZ 7.5 L11.73IN FNGR THK9.8MIL STRW LTX POLYMER

## (undated) DEVICE — 3M™ MEDIPORE™ H SOFT CLOTH SURGICAL TAPE 2864, 4 INCH X 10 YARD (10CM X 9,14M), 12 ROLLS/CASE: Brand: 3M™ MEDIPORE™